# Patient Record
Sex: FEMALE | Race: WHITE | NOT HISPANIC OR LATINO | Employment: PART TIME | URBAN - METROPOLITAN AREA
[De-identification: names, ages, dates, MRNs, and addresses within clinical notes are randomized per-mention and may not be internally consistent; named-entity substitution may affect disease eponyms.]

---

## 2017-02-08 ENCOUNTER — OFFICE VISIT (OUTPATIENT)
Dept: FAMILY MEDICINE | Facility: CLINIC | Age: 57
End: 2017-02-08
Payer: COMMERCIAL

## 2017-02-08 ENCOUNTER — TELEPHONE (OUTPATIENT)
Dept: FAMILY MEDICINE | Facility: CLINIC | Age: 57
End: 2017-02-08

## 2017-02-08 ENCOUNTER — HOSPITAL ENCOUNTER (OUTPATIENT)
Dept: MAMMOGRAPHY | Facility: CLINIC | Age: 57
Discharge: HOME | End: 2017-02-08
Attending: NURSE PRACTITIONER | Admitting: NURSE PRACTITIONER
Payer: COMMERCIAL

## 2017-02-08 VITALS
SYSTOLIC BLOOD PRESSURE: 110 MMHG | BODY MASS INDEX: 28.49 KG/M2 | WEIGHT: 188 LBS | DIASTOLIC BLOOD PRESSURE: 68 MMHG | HEART RATE: 60 BPM | TEMPERATURE: 98.2 F | RESPIRATION RATE: 16 BRPM | OXYGEN SATURATION: 99 % | HEIGHT: 68 IN

## 2017-02-08 DIAGNOSIS — F41.9 ANXIETY: ICD-10-CM

## 2017-02-08 DIAGNOSIS — E78.5 HYPERLIPIDEMIA LDL GOAL <130: ICD-10-CM

## 2017-02-08 DIAGNOSIS — K51.90 ULCERATIVE COLITIS WITHOUT COMPLICATIONS, UNSPECIFIED LOCATION (H): ICD-10-CM

## 2017-02-08 DIAGNOSIS — Z12.31 VISIT FOR SCREENING MAMMOGRAM: ICD-10-CM

## 2017-02-08 DIAGNOSIS — F34.1 DYSTHYMIA: ICD-10-CM

## 2017-02-08 DIAGNOSIS — G43.009 MIGRAINE WITHOUT AURA AND WITHOUT STATUS MIGRAINOSUS, NOT INTRACTABLE: ICD-10-CM

## 2017-02-08 DIAGNOSIS — Z00.00 ENCOUNTER FOR WELL WOMAN EXAM WITHOUT GYNECOLOGICAL EXAM: Primary | ICD-10-CM

## 2017-02-08 DIAGNOSIS — Z90.710 S/P HYSTERECTOMY: ICD-10-CM

## 2017-02-08 LAB
ALBUMIN SERPL-MCNC: 4.4 G/DL (ref 3.4–5)
ALP SERPL-CCNC: 89 U/L (ref 40–150)
ALT SERPL W P-5'-P-CCNC: 29 U/L (ref 0–50)
ANION GAP SERPL CALCULATED.3IONS-SCNC: 10 MMOL/L (ref 3–14)
AST SERPL W P-5'-P-CCNC: 18 U/L (ref 0–45)
BILIRUB SERPL-MCNC: 0.8 MG/DL (ref 0.2–1.3)
BUN SERPL-MCNC: 17 MG/DL (ref 7–30)
CALCIUM SERPL-MCNC: 9.4 MG/DL (ref 8.5–10.1)
CHLORIDE SERPL-SCNC: 104 MMOL/L (ref 94–109)
CHOLEST SERPL-MCNC: 206 MG/DL
CO2 SERPL-SCNC: 27 MMOL/L (ref 20–32)
CREAT SERPL-MCNC: 0.86 MG/DL (ref 0.52–1.04)
GFR SERPL CREATININE-BSD FRML MDRD: 68 ML/MIN/1.7M2
GLUCOSE SERPL-MCNC: 91 MG/DL (ref 70–99)
HDLC SERPL-MCNC: 53 MG/DL
HGB BLD-MCNC: 13.3 G/DL (ref 11.7–15.7)
LDLC SERPL CALC-MCNC: 128 MG/DL
NONHDLC SERPL-MCNC: 153 MG/DL
POTASSIUM SERPL-SCNC: 4.1 MMOL/L (ref 3.4–5.3)
PROT SERPL-MCNC: 7.8 G/DL (ref 6.8–8.8)
SODIUM SERPL-SCNC: 141 MMOL/L (ref 133–144)
TRIGL SERPL-MCNC: 126 MG/DL

## 2017-02-08 PROCEDURE — G0202 SCR MAMMO BI INCL CAD: HCPCS

## 2017-02-08 PROCEDURE — 80061 LIPID PANEL: CPT | Performed by: NURSE PRACTITIONER

## 2017-02-08 PROCEDURE — 99396 PREV VISIT EST AGE 40-64: CPT | Performed by: NURSE PRACTITIONER

## 2017-02-08 PROCEDURE — 80053 COMPREHEN METABOLIC PANEL: CPT | Performed by: NURSE PRACTITIONER

## 2017-02-08 PROCEDURE — 36415 COLL VENOUS BLD VENIPUNCTURE: CPT | Performed by: NURSE PRACTITIONER

## 2017-02-08 PROCEDURE — 85018 HEMOGLOBIN: CPT | Performed by: NURSE PRACTITIONER

## 2017-02-08 RX ORDER — KETOROLAC TROMETHAMINE 10 MG/1
10 TABLET, FILM COATED ORAL EVERY 6 HOURS PRN
Qty: 20 TABLET | Refills: 1 | Status: SHIPPED | OUTPATIENT
Start: 2017-02-08 | End: 2018-03-12

## 2017-02-08 RX ORDER — OXYCODONE HYDROCHLORIDE 5 MG/1
5-10 TABLET ORAL EVERY 6 HOURS PRN
Qty: 30 TABLET | Refills: 0 | Status: SHIPPED | OUTPATIENT
Start: 2017-02-08 | End: 2018-03-12

## 2017-02-08 RX ORDER — ATORVASTATIN CALCIUM 10 MG/1
10 TABLET, FILM COATED ORAL DAILY
Qty: 90 TABLET | Refills: 3 | Status: SHIPPED | OUTPATIENT
Start: 2017-02-08 | End: 2018-02-07

## 2017-02-08 RX ORDER — RIZATRIPTAN BENZOATE 10 MG/1
10 TABLET ORAL
Qty: 18 TABLET | Refills: 9 | Status: SHIPPED | OUTPATIENT
Start: 2017-02-08 | End: 2018-02-18

## 2017-02-08 RX ORDER — CITALOPRAM HYDROBROMIDE 20 MG/1
30 TABLET ORAL DAILY
Qty: 135 TABLET | Refills: 3 | Status: SHIPPED | OUTPATIENT
Start: 2017-02-08 | End: 2018-03-12

## 2017-02-08 RX ORDER — ONDANSETRON 4 MG/1
4-8 TABLET, ORALLY DISINTEGRATING ORAL EVERY 8 HOURS PRN
Qty: 20 TABLET | Refills: 3 | Status: SHIPPED | OUTPATIENT
Start: 2017-02-08 | End: 2018-03-12

## 2017-02-08 NOTE — TELEPHONE ENCOUNTER
Please call Joslyn.  She was seen in the clinic today for a physical.  She is due a colonoscopy.  Last colonoscopy was 2011 and it was recommend that next colonoscopy in 3-5 years.  Thank you.

## 2017-02-08 NOTE — NURSING NOTE
"Chief Complaint   Patient presents with     Physical       Initial /68 mmHg  Pulse 60  Temp(Src) 98.2  F (36.8  C) (Oral)  Resp 16  Ht 5' 8.25\" (1.734 m)  Wt 188 lb (85.276 kg)  BMI 28.36 kg/m2  SpO2 99%  LMP 02/09/2014  Breastfeeding? No Estimated body mass index is 28.36 kg/(m^2) as calculated from the following:    Height as of this encounter: 5' 8.25\" (1.734 m).    Weight as of this encounter: 188 lb (85.276 kg).  Medication Reconciliation: complete.  DALE Gramajo      "

## 2017-02-08 NOTE — Clinical Note
[facility]   [address], [place]  [address]  [phone]      February 9, 2017      Joslyn Sanchez  [address] [address]              Dear Ms. Daniel,    Here are the results of your lab studies. Everything is looking good. Keep taking your cholesterol medication as your cholesterol panel is still a little off.     Take care of yourself and If there is anything else that I can do for you, please do not hesitate to let me know.    Results for orders placed or performed in visit on 02/08/17   Comprehensive metabolic panel   Result Value Ref Range    Sodium 141 133 - 144 mmol/L    Potassium 4.1 3.4 - 5.3 mmol/L    Chloride 104 94 - 109 mmol/L    Carbon Dioxide 27 20 - 32 mmol/L    Anion Gap 10 3 - 14 mmol/L    Glucose 91 70 - 99 mg/dL    Urea Nitrogen 17 7 - 30 mg/dL    Creatinine 0.86 0.52 - 1.04 mg/dL    GFR Estimate 68 >60 mL/min/1.7m2    GFR Estimate If Black 82 >60 mL/min/1.7m2    Calcium 9.4 8.5 - 10.1 mg/dL    Bilirubin Total 0.8 0.2 - 1.3 mg/dL    Albumin 4.4 3.4 - 5.0 g/dL    Protein Total 7.8 6.8 - 8.8 g/dL    Alkaline Phosphatase 89 40 - 150 U/L    ALT 29 0 - 50 U/L    AST 18 0 - 45 U/L   Hemoglobin   Result Value Ref Range    Hemoglobin 13.3 11.7 - 15.7 g/dL   Lipid panel reflex to direct LDL   Result Value Ref Range    Cholesterol 206 (H) <200 mg/dL    Triglycerides 126 <150 mg/dL    HDL Cholesterol 53 >49 mg/dL    LDL Cholesterol Calculated 128 (H) <100 mg/dL    Non HDL Cholesterol 153 (H) <130 mg/dL           Sincerely,    Joslyn Cardoza, CNP/nr

## 2017-02-08 NOTE — MR AVS SNAPSHOT
After Visit Summary   2/8/2017    Joslyn Sanchez    MRN: [id number]           Patient Information     Date Of Birth          1960        Visit Information        Provider Department      2/8/2017 8:20 AM Joslyn Cardoza APRN [facility]        Today's Diagnoses     Encounter for well woman exam without gynecological exam    -  1     Anxiety         Dysthymia         Migraine without aura and without status migrainosus, not intractable         Hyperlipidemia LDL goal <130         S/P hysterectomy           Care Instructions      Preventive Health Recommendations  Female Ages 50 - 64    Yearly exam: See your health care provider every year in order to  o Review health changes.   o Discuss preventive care.    o Review your medicines if your doctor has prescribed any.      Get a Pap test every three years (unless you have an abnormal result and your provider advises testing more often).    If you get Pap tests with HPV test, you only need to test every 5 years, unless you have an abnormal result.     You do not need a Pap test if your uterus was removed (hysterectomy) and you have not had cancer.    You should be tested each year for STDs (sexually transmitted diseases) if you're at risk.     Have a mammogram every 1 to 2 years.    Have a colonoscopy at age 50, or have a yearly FIT test (stool test). These exams screen for colon cancer.      Have a cholesterol test every 5 years, or more often if advised.    Have a diabetes test (fasting glucose) every three years. If you are at risk for diabetes, you should have this test more often.     If you are at risk for osteoporosis (brittle bone disease), think about having a bone density scan (DEXA).    Shots: Get a flu shot each year. Get a tetanus shot every 10 years.    Nutrition:     Eat at least 5 servings of fruits and vegetables each day.    Eat whole-grain bread, whole-wheat pasta and brown rice instead of white  "grains and rice.    Talk to your provider about Calcium and Vitamin D.     Lifestyle    Exercise at least 150 minutes a week (30 minutes a day, 5 days a week). This will help you control your weight and prevent disease.    Limit alcohol to one drink per day.    No smoking.     Wear sunscreen to prevent skin cancer.     See your dentist every six months for an exam and cleaning.    See your eye doctor every 1 to 2 years.            Follow-ups after your visit        Who to contact     If you have questions or need follow up information about today's clinic visit or your schedule please contact [facility] directly at [phone].  Normal or non-critical lab and imaging results will be communicated to you by [email or web address], letter or phone within 4 business days after the clinic has received the results. If you do not hear from us within 7 days, please contact the clinic through [email or web address] or phone. If you have a critical or abnormal lab result, we will notify you by phone as soon as possible.  Submit refill requests through [employer] or call your pharmacy and they will forward the refill request to us. Please allow 3 business days for your refill to be completed.          Additional Information About Your Visit        [email or web address] Information     [employer] lets you send messages to your doctor, view your test results, renew your prescriptions, schedule appointments and more. To sign up, go to [email or web address] . Click on \"Log in\" on the left side of the screen, which will take you to the Welcome page. Then click on \"Sign up Now\" on the right side of the page.     You will be asked to enter the access code listed below, as well as some personal information. Please follow the directions to create your username and password.     Your access code is: [email or web address]  Expires: 2017  9:01 AM     Your access code will  in 90 days. If you need help or a new code, please call your [facility] clinic or " "[phone].        Care EveryWhere ID     This is your Care EveryWhere ID. This could be used by other organizations to access your [facility] medical records  [id number]        Your Vitals Were     Pulse Temperature Respirations    60 98.2  F (36.8  C) (Oral) 16    Height BMI (Body Mass Index) Pulse Oximetry    5' 8.25\" (1.734 m) 28.36 kg/m2 99%    Last Period Breastfeeding?       02/09/2014 No        Blood Pressure from Last 3 Encounters:   02/08/17 110/68   11/25/15 128/80   09/11/14 116/74    Weight from Last 3 Encounters:   02/08/17 188 lb (85.276 kg)   11/25/15 178 lb 12.8 oz (81.103 kg)   06/07/15 170 lb (77.111 kg)              We Performed the Following     Comprehensive metabolic panel     DEPRESSION ACTION PLAN (DAP)     Hemoglobin     Lipid panel reflex to direct LDL          Where to get your medicines      These medications were sent to [id number] - [place] - [address] AT [address]  [address], [place] [address]     Phone:  [phone]    - atorvastatin 10 MG tablet  - citalopram 20 MG tablet  - ketorolac 10 MG tablet  - ondansetron 4 MG ODT tab  - rizatriptan 10 MG tablet      Some of these will need a paper prescription and others can be bought over the counter.  Ask your nurse if you have questions.     Bring a paper prescription for each of these medications    - oxyCODONE 5 MG IR tablet       Primary Care Provider Office Phone # Fax #    [facility] [phone]       [facility] [address] [place] [address]        Thank you!     Thank you for choosing [facility]  for your care. Our goal is always to provide you with excellent care. Hearing back from our patients is one way we can continue to improve our services. Please take a few minutes to complete the written survey that you may receive in the mail after your visit with us. Thank you!             Your " Updated Medication List - Protect others around you: Learn how to safely use, store and throw away your medicines at www.disposemymeds.org.          This list is accurate as of: 2/8/17  9:01 AM.  Always use your most recent med list.                   Brand Name Dispense Instructions for use    atorvastatin 10 MG tablet    LIPITOR    90 tablet    Take 1 tablet (10 mg) by mouth daily       citalopram 20 MG tablet    celeXA    135 tablet    Take 1.5 tablets (30 mg) by mouth daily       DAILY MULTIVITAMIN PO      Take 1 tablet by mouth daily       ketorolac 10 MG tablet    TORADOL    20 tablet    Take 1 tablet (10 mg) by mouth every 6 hours as needed for moderate pain       ondansetron 4 MG ODT tab    ZOFRAN ODT    20 tablet    Take 1-2 tablets (4-8 mg) by mouth every 8 hours as needed for nausea       oxyCODONE 5 MG IR tablet    ROXICODONE    30 tablet    Take 1-2 tablets (5-10 mg) by mouth every 6 hours as needed for pain or other (Moderate to Severe)       rizatriptan 10 MG tablet    MAXALT    18 tablet    Take 1 tablet (10 mg) by mouth at onset of headache for migraine May repeat dose in 2 hours.  Do not exceed 30 mg in 24 hours

## 2017-02-08 NOTE — Clinical Note
My Depression Action Plan  Name: Joslyn Sanchez   Date of Birth 1960  Date: 2/8/2017    My doctor: Joslyn Cardoza   My clinic: [address] [address]  [phone]          GREEN    ZONE   Good Control    What it looks like:     Things are going generally well. You have normal up s and down s. You may even feel depressed from time to time, but bad moods usually last less than a day.   What you need to do:  1. Continue to care for yourself (see self care plan)  2. Check your depression survival kit and update it as needed  3. Follow your physician s recommendations including any medication.  4. Do not stop taking medication unless you consult with your physician first.           YELLOW         ZONE Getting Worse    What it looks like:     Depression is starting to interfere with your life.     It may be hard to get out of bed; you may be starting to isolate yourself from others.    Symptoms of depression are starting to last most all day and this has happened for several days.     You may have suicidal thoughts but they are not constant.   What you need to do:     1. Call your care team, your response to treatment will improve if you keep your care team informed of your progress. Yellow periods are signs an adjustment may need to be made.     2. Continue your self-care, even if you have to fake it!    3. Talk to someone in your support network    4. Open up your depression survival kit           RED    ZONE Medical Alert - Get Help    What it looks like:     Depression is seriously interfering with your life.     You may experience these or other symptoms: You can t get out of bed most days, can t work or engage in other necessary activities, you have trouble taking care of basic hygiene, or basic responsibilities, thoughts of suicide or death that will not go away, self-injurious behavior.     What you need to do:  1. Call your  care team and request a same-day appointment. If they are not available (weekends or after hours) call your local crisis line, emergency room or 911.      Electronically signed by: LASHANDA JURADO, February 8, 2017    Depression Self Care Plan / Survival Kit    Self-Care for Depression  Here s the deal. Your body and mind are really not as separate as most people think.  What you do and think affects how you feel and how you feel influences what you do and think. This means if you do things that people who feel good do, it will help you feel better.  Sometimes this is all it takes.  There is also a place for medication and therapy depending on how severe your depression is, so be sure to consult with your medical provider and/ or Behavioral Health Consultant if your symptoms are worsening or not improving.     In order to better manage my stress, I will:    Exercise  Get some form of exercise, every day. This will help reduce pain and release endorphins, the  feel good  chemicals in your brain. This is almost as good as taking antidepressants!  This is not the same as joining a gym and then never going! (they count on that by the way ) It can be as simple as just going for a walk or doing some gardening, anything that will get you moving.      Hygiene   Maintain good hygiene (Get out of bed in the morning, Make your bed, Brush your teeth, Take a shower, and Get dressed like you were going to work, even if you are unemployed).  If your clothes don't fit try to get ones that do.    Diet  I will strive to eat foods that are good for me, drink plenty of water, and avoid excessive sugar, caffeine, alcohol, and other mood-altering substances.  Some foods that are helpful in depression are: complex carbohydrates, B vitamins, flaxseed, fish or fish oil, fresh fruits and vegetables.    Psychotherapy  I agree to participate in Individual Therapy (if recommended).    Medication  If prescribed medications, I agree to take  them.  Missing doses can result in serious side effects.  I understand that drinking alcohol, or other illicit drug use, may cause potential side effects.  I will not stop my medication abruptly without first discussing it with my provider.    Staying Connected With Others  I will stay in touch with my friends, family members, and my primary care provider/team.    Use your imagination  Be creative.  We all have a creative side; it doesn t matter if it s oil painting, sand castles, or mud pies! This will also kick up the endorphins.    Witness Beauty  (AKA stop and smell the roses) Take a look outside, even in mid-winter. Notice colors, textures. Watch the squirrels and birds.     Service to others  Be of service to others.  There is always someone else in need.  By helping others we can  get out of ourselves  and remember the really important things.  This also provides opportunities for practicing all the other parts of the program.    Humor  Laugh and be silly!  Adjust your TV habits for less news and crime-drama and more comedy.    Control your stress  Try breathing deep, massage therapy, biofeedback, and meditation. Find time to relax each day.     My support system    Clinic Contact:  Phone number:    Contact 1:  Phone number:    Contact 2:  Phone number:    [facility]/:  Phone number:    Therapist:  Phone number:    Local crisis center:    Phone number:    Other community support:  Phone number:

## 2017-02-08 NOTE — PROGRESS NOTES
SUBJECTIVE:     CC: Joslyn Sanchez is an 56 year old woman who presents for preventive health visit.     Physical  Annual:     Getting at least 3 servings of Calcium per day::  Yes    Bi-annual eye exam::  Yes    Dental care twice a year::  Yes    Sleep apnea or symptoms of sleep apnea::  Sleep apnea    Diet::  Low fat/cholesterol    Frequency of exercise::  2-3 days/week    Duration of exercise::  15-30 minutes    Taking medications regularly::  Yes    Medication side effects::  None    Additional concerns today::  No        Concerns: None     Today's PHQ-2 Score:   PHQ-2 ( 1999 Pfizer) 2/8/2017   Q1: Little interest or pleasure in doing things -   Q2: Feeling down, depressed or hopeless -   PHQ-2 Score -   Little interest or pleasure in doing things Not at all   Feeling down, depressed or hopeless Not at all   PHQ-2 Score 0       Abuse: Current or Past(Physical, Sexual or Emotional)- No  Do you feel safe in your environment - Yes    Social History   Substance Use Topics     Smoking status: Former Smoker     Types: Cigarettes     Smokeless tobacco: Never Used      Comment: Quit 28 years ago     Alcohol Use: 0.0 oz/week     0 Standard drinks or equivalent per week      Comment: socially      The patient does not drink >3 drinks per day nor >7 drinks per week.    Recent Labs   Lab Test  11/25/15   0846  08/06/14   1020  10/29/13   1429   CHOL  201*  160  196   HDL  62  66  48*   LDL  113*  76  112   TRIG  128  92  181*   CHOLHDLRATIO   --   2.4  4.1   NHDL  139*   --    --        Reviewed orders with patient.  Reviewed health maintenance and updated orders accordingly - Yes    Mammo Decision Support:  Patient over age 50, mutual decision to screen reflected in health maintenance.    Pertinent mammograms are reviewed under the imaging tab.  History of abnormal Pap smear:complete hysterectomy  She is having some hot flashes.    Migraines.  Still having migraines occasionally.  Maxalt as helpful.  She does  become profoundly nauseated with some headaches and the Zofran is helpful. She does not think she uses the Zofran very often.  She will use Percocet rarely for headaches. 30 tablets will last her one full year.    Cholesterol.  She is on atorvastatin.  Her weight is currently up, and she is working on that.  She tries to eat a lower fat diet.  She has had problems with plantar fasciitis and she has not been able to work out as regularly.  Her feet are feeling better and she is able to work out a little bit more now.  She is planning on going to the [employer] for exercise.    History of UC.  No current symptoms.    All Histories reviewed and updated in Epic.      ROS:  C: NEGATIVE for fever, chills, change in weight  I: NEGATIVE for worrisome rashes, moles or lesions  E: NEGATIVE for vision changes or irritation  ENT: NEGATIVE for ear, mouth and throat problems  R: NEGATIVE for significant cough or SOB  B: NEGATIVE for masses, tenderness or discharge  CV: NEGATIVE for chest pain, palpitations or peripheral edema  GI: NEGATIVE for nausea, abdominal pain, heartburn, or change in bowel habits  : NEGATIVE for unusual urinary or vaginal symptoms. No vaginal bleeding.  M: NEGATIVE for significant arthralgias or myalgia  N: NEGATIVE for weakness, dizziness or paresthesias  E: NEGATIVE for temperature intolerance, skin/hair changes  P: NEGATIVE for changes in mood or affect     Problem list, Medication list, Allergies, and Medical/Social/Surgical histories reviewed in [facility] and updated as appropriate.  Labs reviewed in EPIC  BP Readings from Last 3 Encounters:   02/08/17 110/68   11/25/15 128/80   09/11/14 116/74    Wt Readings from Last 3 Encounters:   02/08/17 188 lb (85.276 kg)   11/25/15 178 lb 12.8 oz (81.103 kg)   06/07/15 170 lb (77.111 kg)               Patient Active Problem List   Diagnosis     Ulcerative colitis (H)     Other health problem within the family     HYPERLIPIDEMIA LDL GOAL <130     Dysthymia     Overweight  (BMI 25.0-29.9)     Anxiety     Advanced directives, counseling/discussion     MIGUEL on CPAP     Perimenopause     S/P hysterectomy     Pain of right heel     Past Surgical History   Procedure Laterality Date     Appendectomy       Laparascopy  x 2 in age 30s     Infertiility     Knee surgery  4/2012     left knee, meniscus     Laparoscopic hysterectomy total, bilateral salpingo-oophorectomy, combined Bilateral 2/18/2014     Procedure: COMBINED LAPAROSCOPIC HYSTERECTOMY TOTAL, SALPINGO-OOPHORECTOMY;  Surgeon: Rama Burton MD;  Location: [facility] OR     Cystoscopy N/A 2/18/2014     Procedure: CYSTOSCOPY;  Surgeon: Rama Burton MD;  Location: [facility] OR       Social History   Substance Use Topics     Smoking status: Former Smoker     Types: Cigarettes     Smokeless tobacco: Never Used      Comment: Quit 28 years ago     Alcohol Use: 0.0 oz/week     0 Standard drinks or equivalent per week      Comment: socially      Family History   Problem Relation Age of Onset     Lipids Mother      DIABETES Maternal Grandmother      DIABETES Paternal Grandmother      Medical History Unknown Father          Current Outpatient Prescriptions   Medication Sig Dispense Refill     citalopram (CELEXA) 20 MG tablet Take 1.5 tablets (30 mg) by mouth daily 135 tablet 3     rizatriptan (MAXALT) 10 MG tablet Take 1 tablet (10 mg) by mouth at onset of headache for migraine May repeat dose in 2 hours.  Do not exceed 30 mg in 24 hours 18 tablet 9     atorvastatin (LIPITOR) 10 MG tablet Take 1 tablet (10 mg) by mouth daily 90 tablet 3     ondansetron (ZOFRAN ODT) 4 MG ODT tab Take 1-2 tablets (4-8 mg) by mouth every 8 hours as needed for nausea 20 tablet 3     ketorolac (TORADOL) 10 MG tablet Take 1 tablet (10 mg) by mouth every 6 hours as needed for moderate pain 20 tablet 1     oxyCODONE (ROXICODONE) 5 MG IR tablet Take 1-2 tablets (5-10 mg) by mouth every 6 hours as needed for pain or other (Moderate to Severe) 30 tablet 0     Multiple  "Vitamin (DAILY MULTIVITAMIN PO) Take 1 tablet by mouth daily       [DISCONTINUED] citalopram (CELEXA) 20 MG tablet Take 1.5 tablets (30 mg) by mouth daily 135 tablet 3     [DISCONTINUED] atorvastatin (LIPITOR) 10 MG tablet Take 1 tablet (10 mg) by mouth daily 90 tablet 3     Allergies   Allergen Reactions     No Known Drug Allergies      Recent Labs   Lab Test  11/25/15   0846  08/06/14   1020  10/29/13   1429  12/03/12   0951  11/21/11   0908   LDL  113*  76  112  116  128   HDL  62  66  48*  50  45*   TRIG  128  92  181*  212*  236*   ALT  27  19  29  27  23   CR  0.90   --   0.91  0.80  0.87   GFRESTIMATED  65   --   64  75  68   GFRESTBLACK  78   --   78  >90  82   POTASSIUM  4.5   --   4.4  5.0  4.6   TSH   --    --    --   1.20  1.10      OBJECTIVE:     /68 mmHg  Pulse 60  Temp(Src) 98.2  F (36.8  C) (Oral)  Resp 16  Ht 5' 8.25\" (1.734 m)  Wt 188 lb (85.276 kg)  BMI 28.36 kg/m2  SpO2 99%  LMP 02/09/2014  Breastfeeding? No  EXAM:  GENERAL: healthy, alert and no distress  EYES: Eyes grossly normal to inspection, PERRL and conjunctivae and sclerae normal  HENT: ear canals and TM's normal, nose and mouth without ulcers or lesions  NECK: no adenopathy, no asymmetry, masses, or scars and thyroid normal to palpation  RESP: lungs clear to auscultation - no rales, rhonchi or wheezes  BREAST: normal without masses, tenderness or nipple discharge and no palpable axillary masses or adenopathy  CV: regular rate and rhythm, normal S1 S2, no S3 or S4, no murmur, click or rub, no peripheral edema and peripheral pulses strong  ABDOMEN: soft, nontender, no hepatosplenomegaly, no masses and bowel sounds normal  MS: no gross musculoskeletal defects noted, no edema  SKIN: no suspicious lesions or rashes  NEURO: Normal strength and tone, mentation intact and speech normal  PSYCH: mentation appears normal, affect normal/bright    ASSESSMENT/PLAN:     (Z00.00) Encounter for well woman exam without gynecological exam  " "(primary encounter diagnosis)  Comment:   Plan: Comprehensive metabolic panel, Hemoglobin,         Lipid panel reflex to direct LDL          Mammogram at her earliest convenience.      (K51.90) Ulcerative colitis without complications, unspecified location (H)  Comment:   Plan:   Due colonoscopy now.      (F41.9) Anxiety  Comment:   Plan: DEPRESSION ACTION PLAN (DAP)            (F34.1) Dysthymia  Comment:   Plan: citalopram (CELEXA) 20 MG tablet        Refills given    (G43.009) Migraine without aura and without status migrainosus, not intractable  Comment:   Plan: rizatriptan (MAXALT) 10 MG tablet, ondansetron         (ZOFRAN ODT) 4 MG ODT tab, ketorolac (TORADOL)         10 MG tablet        Refills given    (E78.5) Hyperlipidemia LDL goal <130  Comment:   Plan: atorvastatin (LIPITOR) 10 MG tablet,         Comprehensive metabolic panel, Lipid panel         reflex to direct LDL        Refills given    (Z90.710) S/P hysterectomy  Comment:   Plan: oxyCODONE (ROXICODONE) 5 MG IR tablet, OB/GYN         REFERRAL        Refills given.  Use sparingly.      COUNSELING:  Reviewed preventive health counseling, as reflected in patient instructions         reports that she has quit smoking. Her smoking use included Cigarettes. She has never used smokeless tobacco.    Estimated body mass index is 28.36 kg/(m^2) as calculated from the following:    Height as of this encounter: 5' 8.25\" (1.734 m).    Weight as of this encounter: 188 lb (85.276 kg).   Weight management plan: Discussed healthy diet and exercise guidelines and patient will follow up in 12 months in clinic to re-evaluate.    Counseling Resources:  ATP IV Guidelines  Pooled Cohorts Equation Calculator  Breast Cancer Risk Calculator  FRAX Risk Assessment  ICSI Preventive Guidelines  Dietary Guidelines for Americans, 2010  USDA's MyPlate  ASA Prophylaxis  Lung CA Screening    Joslyn Cardoza, RENUKA [facility]  Answers for HPI/ROS submitted " by the patient on 2/8/2017   PHQ-2 Depressed: Not at all, Not at all  PHQ-2 Score: 0

## 2017-02-10 NOTE — TELEPHONE ENCOUNTER
Relayed message to pt and she stated she also got a reminder letter in the mail and she will schedule soon.  Mayela DWYER  [place]

## 2017-02-17 ENCOUNTER — TELEPHONE (OUTPATIENT)
Dept: FAMILY MEDICINE | Facility: CLINIC | Age: 57
End: 2017-02-17

## 2017-02-17 NOTE — TELEPHONE ENCOUNTER
Missed questionnaire at recent office visit, called pt. She states that she completed form and placed in mail today.       Jae Barahona MA

## 2017-02-22 ASSESSMENT — PATIENT HEALTH QUESTIONNAIRE - PHQ9: SUM OF ALL RESPONSES TO PHQ QUESTIONS 1-9: 2

## 2017-03-13 ENCOUNTER — TRANSFERRED RECORDS (OUTPATIENT)
Dept: HEALTH INFORMATION MANAGEMENT | Facility: CLINIC | Age: 57
End: 2017-03-13

## 2017-03-31 ENCOUNTER — HEALTH MAINTENANCE LETTER (OUTPATIENT)
Age: 57
End: 2017-03-31

## 2018-03-12 ENCOUNTER — OFFICE VISIT (OUTPATIENT)
Dept: FAMILY MEDICINE | Facility: CLINIC | Age: 58
End: 2018-03-12
Payer: COMMERCIAL

## 2018-03-12 VITALS
DIASTOLIC BLOOD PRESSURE: 81 MMHG | BODY MASS INDEX: 28.34 KG/M2 | TEMPERATURE: 98 F | WEIGHT: 187 LBS | HEIGHT: 68 IN | OXYGEN SATURATION: 99 % | HEART RATE: 52 BPM | RESPIRATION RATE: 18 BRPM | SYSTOLIC BLOOD PRESSURE: 126 MMHG

## 2018-03-12 DIAGNOSIS — G43.009 MIGRAINE WITHOUT AURA AND WITHOUT STATUS MIGRAINOSUS, NOT INTRACTABLE: ICD-10-CM

## 2018-03-12 DIAGNOSIS — E78.5 HYPERLIPIDEMIA LDL GOAL <130: ICD-10-CM

## 2018-03-12 DIAGNOSIS — Z00.00 ROUTINE GENERAL MEDICAL EXAMINATION AT A HEALTH CARE FACILITY: Primary | ICD-10-CM

## 2018-03-12 DIAGNOSIS — F34.1 DYSTHYMIA: ICD-10-CM

## 2018-03-12 DIAGNOSIS — K51.90 ULCERATIVE COLITIS WITHOUT COMPLICATIONS, UNSPECIFIED LOCATION (H): ICD-10-CM

## 2018-03-12 DIAGNOSIS — Z90.710 S/P HYSTERECTOMY: ICD-10-CM

## 2018-03-12 DIAGNOSIS — Z12.31 ENCOUNTER FOR SCREENING MAMMOGRAM FOR BREAST CANCER: ICD-10-CM

## 2018-03-12 LAB
ALBUMIN SERPL-MCNC: 4.4 G/DL (ref 3.4–5)
ALP SERPL-CCNC: 79 U/L (ref 40–150)
ALT SERPL W P-5'-P-CCNC: 20 U/L (ref 0–50)
ANION GAP SERPL CALCULATED.3IONS-SCNC: 5 MMOL/L (ref 3–14)
AST SERPL W P-5'-P-CCNC: 16 U/L (ref 0–45)
BASOPHILS # BLD AUTO: 0 10E9/L (ref 0–0.2)
BASOPHILS NFR BLD AUTO: 0.8 %
BILIRUB SERPL-MCNC: 1.2 MG/DL (ref 0.2–1.3)
BUN SERPL-MCNC: 23 MG/DL (ref 7–30)
CALCIUM SERPL-MCNC: 9.2 MG/DL (ref 8.5–10.1)
CHLORIDE SERPL-SCNC: 102 MMOL/L (ref 94–109)
CHOLEST SERPL-MCNC: 202 MG/DL
CO2 SERPL-SCNC: 29 MMOL/L (ref 20–32)
CREAT SERPL-MCNC: 1.02 MG/DL (ref 0.52–1.04)
DEPRECATED CALCIDIOL+CALCIFEROL SERPL-MC: 22 UG/L (ref 20–75)
DIFFERENTIAL METHOD BLD: NORMAL
EOSINOPHIL # BLD AUTO: 0.1 10E9/L (ref 0–0.7)
EOSINOPHIL NFR BLD AUTO: 1.6 %
ERYTHROCYTE [DISTWIDTH] IN BLOOD BY AUTOMATED COUNT: 12.8 % (ref 10–15)
GFR SERPL CREATININE-BSD FRML MDRD: 55 ML/MIN/1.7M2
GLUCOSE SERPL-MCNC: 86 MG/DL (ref 70–99)
HCT VFR BLD AUTO: 39.5 % (ref 35–47)
HDLC SERPL-MCNC: 56 MG/DL
HGB BLD-MCNC: 13.1 G/DL (ref 11.7–15.7)
LDLC SERPL CALC-MCNC: 127 MG/DL
LYMPHOCYTES # BLD AUTO: 1.8 10E9/L (ref 0.8–5.3)
LYMPHOCYTES NFR BLD AUTO: 35.4 %
MCH RBC QN AUTO: 27.9 PG (ref 26.5–33)
MCHC RBC AUTO-ENTMCNC: 33.2 G/DL (ref 31.5–36.5)
MCV RBC AUTO: 84 FL (ref 78–100)
MONOCYTES # BLD AUTO: 0.5 10E9/L (ref 0–1.3)
MONOCYTES NFR BLD AUTO: 10.7 %
NEUTROPHILS # BLD AUTO: 2.6 10E9/L (ref 1.6–8.3)
NEUTROPHILS NFR BLD AUTO: 51.5 %
NONHDLC SERPL-MCNC: 146 MG/DL
PLATELET # BLD AUTO: 236 10E9/L (ref 150–450)
POTASSIUM SERPL-SCNC: 4.1 MMOL/L (ref 3.4–5.3)
PROT SERPL-MCNC: 7.7 G/DL (ref 6.8–8.8)
RBC # BLD AUTO: 4.69 10E12/L (ref 3.8–5.2)
SODIUM SERPL-SCNC: 136 MMOL/L (ref 133–144)
TRIGL SERPL-MCNC: 93 MG/DL
WBC # BLD AUTO: 5 10E9/L (ref 4–11)

## 2018-03-12 PROCEDURE — 99396 PREV VISIT EST AGE 40-64: CPT | Performed by: FAMILY MEDICINE

## 2018-03-12 PROCEDURE — 85025 COMPLETE CBC W/AUTO DIFF WBC: CPT | Performed by: FAMILY MEDICINE

## 2018-03-12 PROCEDURE — 80061 LIPID PANEL: CPT | Performed by: FAMILY MEDICINE

## 2018-03-12 PROCEDURE — 80053 COMPREHEN METABOLIC PANEL: CPT | Performed by: FAMILY MEDICINE

## 2018-03-12 PROCEDURE — 82306 VITAMIN D 25 HYDROXY: CPT | Performed by: FAMILY MEDICINE

## 2018-03-12 PROCEDURE — 36415 COLL VENOUS BLD VENIPUNCTURE: CPT | Performed by: FAMILY MEDICINE

## 2018-03-12 RX ORDER — ATORVASTATIN CALCIUM 10 MG/1
10 TABLET, FILM COATED ORAL DAILY
Qty: 90 TABLET | Refills: 3 | Status: SHIPPED | OUTPATIENT
Start: 2018-03-12 | End: 2019-04-04

## 2018-03-12 RX ORDER — OXYCODONE HYDROCHLORIDE 5 MG/1
5-10 TABLET ORAL EVERY 6 HOURS PRN
Qty: 45 TABLET | Refills: 0 | Status: SHIPPED | OUTPATIENT
Start: 2018-03-12 | End: 2018-03-12

## 2018-03-12 RX ORDER — RIZATRIPTAN BENZOATE 10 MG/1
TABLET ORAL
Qty: 18 TABLET | Refills: 0 | Status: CANCELLED | OUTPATIENT
Start: 2018-03-12

## 2018-03-12 RX ORDER — CITALOPRAM HYDROBROMIDE 20 MG/1
30 TABLET ORAL DAILY
Qty: 135 TABLET | Refills: 3 | Status: SHIPPED | OUTPATIENT
Start: 2018-03-12 | End: 2019-04-04

## 2018-03-12 RX ORDER — ONDANSETRON 4 MG/1
4-8 TABLET, ORALLY DISINTEGRATING ORAL EVERY 8 HOURS PRN
Qty: 30 TABLET | Refills: 3 | Status: SHIPPED | OUTPATIENT
Start: 2018-03-12 | End: 2019-04-17

## 2018-03-12 RX ORDER — KETOROLAC TROMETHAMINE 10 MG/1
10 TABLET, FILM COATED ORAL EVERY 6 HOURS PRN
Qty: 20 TABLET | Refills: 1 | Status: CANCELLED | OUTPATIENT
Start: 2018-03-12

## 2018-03-12 RX ORDER — RIZATRIPTAN BENZOATE 10 MG/1
TABLET ORAL
Qty: 18 TABLET | Refills: 6 | Status: SHIPPED | OUTPATIENT
Start: 2018-03-12 | End: 2019-04-17

## 2018-03-12 RX ORDER — OXYCODONE HYDROCHLORIDE 5 MG/1
5-10 TABLET ORAL EVERY 6 HOURS PRN
Qty: 45 TABLET | Refills: 0 | Status: SHIPPED | OUTPATIENT
Start: 2018-03-12 | End: 2019-04-17

## 2018-03-12 NOTE — PROGRESS NOTES
SUBJECTIVE:   CC: Joslyn Sanchez is an 57 year old woman who presents for preventive health visit.     Physical   Annual:     Getting at least 3 servings of Calcium per day::  Yes    Bi-annual eye exam::  Yes    Dental care twice a year::  Yes    Sleep apnea or symptoms of sleep apnea::  Sleep apnea    Diet::  Low fat/cholesterol    Frequency of exercise::  4-5 days/week    Duration of exercise::  30-45 minutes    Taking medications regularly::  Yes    Additional concerns today::  No              Social History  .  Son at [employer]-senior baseball and daughter at [employer]-- soccer team.  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        Today's PHQ-2 Score:   PHQ-2 ( 1999 Pfizer) 3/12/2018   Q1: Little interest or pleasure in doing things 0   Q2: Feeling down, depressed or hopeless 0   PHQ-2 Score 0   Q1: Little interest or pleasure in doing things Not at all   Q2: Feeling down, depressed or hopeless Not at all   PHQ-2 Score 0       Abuse: Current or Past(Physical, Sexual or Emotional)- No  Do you feel safe in your environment - Yes    Social History   Substance Use Topics     Smoking status: Former Smoker     Types: Cigarettes     Smokeless tobacco: Never Used      Comment: Quit 28 years ago     Alcohol use 0.0 oz/week     0 Standard drinks or equivalent per week      Comment: socially      No flowsheet data found.No flowsheet data found.    Reviewed orders with patient.  Reviewed health maintenance and updated orders accordingly - Yes  BP Readings from Last 3 Encounters:   03/12/18 126/81   02/08/17 110/68   11/25/15 128/80    Wt Readings from Last 3 Encounters:   03/12/18 187 lb (84.8 kg)   02/08/17 188 lb (85.3 kg)   11/25/15 178 lb 12.8 oz (81.1 kg)                  Patient Active Problem List   Diagnosis     Ulcerative colitis (H)     Other health problem within the family     HYPERLIPIDEMIA LDL GOAL <130     Dysthymia     Overweight (BMI 25.0-29.9)     Anxiety     Advanced directives,  counseling/discussion     MIGUEL on CPAP     Perimenopause     S/P hysterectomy     Pain of right heel     Past Surgical History:   Procedure Laterality Date     APPENDECTOMY       CYSTOSCOPY N/A 2/18/2014    Procedure: CYSTOSCOPY;  Surgeon: Rama Burton MD;  Location: [facility] OR     KNEE SURGERY  4/2012    left knee, meniscus     laparascopy  x 2 in age 30s    Infertiility     LAPAROSCOPIC HYSTERECTOMY TOTAL, BILATERAL SALPINGO-OOPHORECTOMY, COMBINED Bilateral 2/18/2014    Procedure: COMBINED LAPAROSCOPIC HYSTERECTOMY TOTAL, SALPINGO-OOPHORECTOMY;  Surgeon: Rama Burton MD;  Location: [facility] OR       Social History   Substance Use Topics     Smoking status: Former Smoker     Types: Cigarettes     Smokeless tobacco: Never Used      Comment: Quit 28 years ago     Alcohol use 0.0 oz/week     0 Standard drinks or equivalent per week      Comment: socially      Family History   Problem Relation Age of Onset     Lipids Mother      DIABETES Maternal Grandmother      DIABETES Paternal Grandmother      Medical History Unknown Father          Current Outpatient Prescriptions   Medication Sig Dispense Refill     atorvastatin (LIPITOR) 10 MG tablet Take 1 tablet (10 mg) by mouth daily 90 tablet 3     citalopram (CELEXA) 20 MG tablet Take 1.5 tablets (30 mg) by mouth daily 135 tablet 3     ondansetron (ZOFRAN ODT) 4 MG ODT tab Take 1-2 tablets (4-8 mg) by mouth every 8 hours as needed for nausea 30 tablet 3     rizatriptan (MAXALT) 10 MG tablet TAKE ONE TABLET BY MOUTH AT ONSET OF MIGRAINE MAY REPEAT DOSE IN 2 HOURS MAX 30MG IN 24 HOURS 18 tablet 6     oxyCODONE IR (ROXICODONE) 5 MG tablet Take 1-2 tablets (5-10 mg) by mouth every 6 hours as needed for pain or other (Moderate to Severe) 45 tablet 0     Multiple Vitamin (DAILY MULTIVITAMIN PO) Take 1 tablet by mouth daily       [DISCONTINUED] atorvastatin (LIPITOR) 10 MG tablet TAKE ONE TABLET BY MOUTH EVERY DAY 30 tablet 0     [DISCONTINUED] citalopram (CELEXA) 20 MG  "tablet Take 1.5 tablets (30 mg) by mouth daily 135 tablet 3     Allergies   Allergen Reactions     No Known Drug Allergies        Patient over age 50, mutual decision to screen reflected in health maintenance.    Pertinent mammograms are reviewed under the imaging tab.  History of abnormal Pap smear: Status post benign hysterectomy. Health Maintenance and Surgical History updated.    Reviewed and updated as needed this visit by clinical staff  Tobacco  Allergies  Meds  Med Hx  Surg Hx  Fam Hx  Soc Hx        Reviewed and updated as needed this visit by Provider            Review of Systems  C: NEGATIVE for fever, chills, change in weight  I: NEGATIVE for worrisome rashes, moles or lesions  E: NEGATIVE for vision changes or irritation  ENT: NEGATIVE for ear, mouth and throat problems  R: NEGATIVE for significant cough or SOB  B: NEGATIVE for masses, tenderness or discharge  CV: NEGATIVE for chest pain, palpitations or peripheral edema  GI: NEGATIVE for nausea, abdominal pain, heartburn, or change in bowel habits  : NEGATIVE for unusual urinary or vaginal symptoms. No vaginal bleeding.  M: NEGATIVE for significant arthralgias or myalgia  N: NEGATIVE for weakness, dizziness or paresthesias  P: NEGATIVE for changes in mood or affect      OBJECTIVE:   /81 (BP Location: Left arm, Patient Position: Sitting, Cuff Size: Adult Regular)  Pulse 52  Temp 98  F (36.7  C) (Oral)  Resp 18  Ht 5' 8.25\" (1.734 m)  Wt 187 lb (84.8 kg)  LMP 02/09/2014  SpO2 99%  BMI 28.23 kg/m2  Physical Exam  GENERAL: healthy, alert and no distress  EYES: Eyes grossly normal to inspection, PERRL and conjunctivae and sclerae normal  HENT: ear canals and TM's normal, nose and mouth without ulcers or lesions  NECK: no adenopathy, no asymmetry, masses, or scars and thyroid normal to palpation  RESP: lungs clear to auscultation - no rales, rhonchi or wheezes  BREAST: normal without masses, tenderness or nipple discharge and no palpable " axillary masses or adenopathy  CV: regular rate and rhythm, normal S1 S2, no S3 or S4, no murmur, click or rub, no peripheral edema and peripheral pulses strong  ABDOMEN: soft, nontender, no hepatosplenomegaly, no masses and bowel sounds normal   (female): normal female external genitalia, normal urethral meatus, vaginal mucosa pink, moist, well rugated, absent uterus, no adnexal masses palpated  MS: no gross musculoskeletal defects noted, no edema  SKIN: no suspicious lesions or rashes  NEURO: Normal strength and tone, mentation intact and speech normal  PSYCH: mentation appears normal, affect normal/bright    ASSESSMENT/PLAN:   1. Routine general medical examination at a health care facility    - CBC with platelets differential  - Comprehensive metabolic panel  - Lipid panel reflex to direct LDL Fasting  - 25- OH-Vitamin D    Fasting labs today.  Continue good diet and exercise.  No PAP- s/p hysterectomy.    2. Migraine without aura and without status migrainosus, not intractable    - ondansetron (ZOFRAN ODT) 4 MG ODT tab; Take 1-2 tablets (4-8 mg) by mouth every 8 hours as needed for nausea  Dispense: 30 tablet; Refill: 3  - rizatriptan (MAXALT) 10 MG tablet; TAKE ONE TABLET BY MOUTH AT ONSET OF MIGRAINE MAY REPEAT DOSE IN 2 HOURS MAX 30MG IN 24 HOURS  Dispense: 18 tablet; Refill: 6    Refill of Zofran and Maxalt.  Was using Toradol po and oxycodone for rescue.  We decided to discontinue po Toradol with GI bleeding risk with UC.    3. Hyperlipidemia LDL goal <130    - atorvastatin (LIPITOR) 10 MG tablet; Take 1 tablet (10 mg) by mouth daily  Dispense: 90 tablet; Refill: 3    Recheck lipids today.  Statin refilled.    4. Dysthymia    - citalopram (CELEXA) 20 MG tablet; Take 1.5 tablets (30 mg) by mouth daily  Dispense: 135 tablet; Refill: 3    Stable on SSRI.    5. S/P hysterectomy    No PAP.  Pelvic sweep normal today.    6. Ulcerative colitis without complications, unspecified location (H)    - 25- OH-Vitamin  "D    Check Vitamin D with family history of deficiency.    7. Encounter for screening mammogram for breast cancer    - MA Screening Digital Bilateral; Future    To schedule mammogram today.    COUNSELING:  Reviewed preventive health counseling, as reflected in patient instructions       Regular exercise       Healthy diet/nutrition       Colon cancer screening       (Lorelei)menopause management    BP Screening:   Last 3 BP Readings:    BP Readings from Last 3 Encounters:   03/12/18 126/81   02/08/17 110/68   11/25/15 128/80       The following was recommended to the patient:  Re-screen BP within a year and recommended lifestyle modifications     reports that she has quit smoking. Her smoking use included Cigarettes. She has never used smokeless tobacco.    Estimated body mass index is 28.23 kg/(m^2) as calculated from the following:    Height as of this encounter: 5' 8.25\" (1.734 m).    Weight as of this encounter: 187 lb (84.8 kg).   Weight management plan: Discussed healthy diet and exercise guidelines and patient will follow up in 12 months in clinic to re-evaluate.    Counseling Resources:  ATP IV Guidelines  Pooled Cohorts Equation Calculator  Breast Cancer Risk Calculator  FRAX Risk Assessment  ICSI Preventive Guidelines  Dietary Guidelines for Americans, 2010  USDA's MyPlate  ASA Prophylaxis  Lung CA Screening    Cheyenne Barron MD  [facility]  Answers for HPI/ROS submitted by the patient on 3/12/2018   PHQ-2 Score: 0    "

## 2018-03-12 NOTE — MR AVS SNAPSHOT
After Visit Summary   3/12/2018    Joslyn Sanchez    MRN: [id number]           Patient Information     Date Of Birth          1960        Visit Information        Provider Department      3/12/2018 8:00 AM Cheyenne Barron MD [facility]        Today's Diagnoses     Routine general medical examination at a health care facility    -  1    Migraine without aura and without status migrainosus, not intractable        Hyperlipidemia LDL goal <130        Dysthymia        S/P hysterectomy        Ulcerative colitis without complications, unspecified location (H)        Encounter for screening mammogram for breast cancer          Care Instructions      Preventive Health Recommendations  Female Ages 50 - 64    Yearly exam: See your health care provider every year in order to  o Review health changes.   o Discuss preventive care.    o Review your medicines if your doctor has prescribed any.      Get a Pap test every three years (unless you have an abnormal result and your provider advises testing more often).    If you get Pap tests with HPV test, you only need to test every 5 years, unless you have an abnormal result.     You do not need a Pap test if your uterus was removed (hysterectomy) and you have not had cancer.    You should be tested each year for STDs (sexually transmitted diseases) if you're at risk.     Have a mammogram every 1 to 2 years.    Have a colonoscopy at age 50, or have a yearly FIT test (stool test). These exams screen for colon cancer.      Have a cholesterol test every 5 years, or more often if advised.    Have a diabetes test (fasting glucose) every three years. If you are at risk for diabetes, you should have this test more often.     If you are at risk for osteoporosis (brittle bone disease), think about having a bone density scan (DEXA).    Shots: Get a flu shot each year. Get a tetanus shot every 10 years.    Nutrition:     Eat at  "least 5 servings of fruits and vegetables each day.    Eat whole-grain bread, whole-wheat pasta and brown rice instead of white grains and rice.    Talk to your provider about Calcium and Vitamin D.     Lifestyle    Exercise at least 150 minutes a week (30 minutes a day, 5 days a week). This will help you control your weight and prevent disease.    Limit alcohol to one drink per day.    No smoking.     Wear sunscreen to prevent skin cancer.     See your dentist every six months for an exam and cleaning.    See your eye doctor every 1 to 2 years.            Follow-ups after your visit        Future tests that were ordered for you today     Open Future Orders        Priority Expected Expires Ordered    MA Screening Digital Bilateral Routine  3/12/2019 3/12/2018            Who to contact     If you have questions or need follow up information about today's clinic visit or your schedule please contact [facility] directly at [phone].  Normal or non-critical lab and imaging results will be communicated to you by [employer], letter or phone within 4 business days after the clinic has received the results. If you do not hear from us within 7 days, please contact the clinic through [employer] or phone. If you have a critical or abnormal lab result, we will notify you by phone as soon as possible.  Submit refill requests through [employer] (power[employer]) or call your pharmacy and they will forward the refill request to us. Please allow 3 business days for your refill to be completed.          Additional Information About Your Visit        [employer] Information     [employer] ([employer]) lets you send messages to your doctor, view your test results, renew your prescriptions, schedule appointments and more. To sign up, go to [email or web address] . Click on \"Log in\" on the left side of the screen, which will take you to the Welcome page. Then click on \"Sign up Now\" on the right side of the page.     You will be asked to enter the access " "code listed below, as well as some personal information. Please follow the directions to create your username and password.     Your access code is: [email or web address]  Expires: 6/10/2018  8:39 AM     Your access code will  in 90 days. If you need help or a new code, please call your [facility] or [phone].        Care EveryWhere ID     This is your Care EveryWhere ID. This could be used by other organizations to access your [facility] medical records  [id number]        Your Vitals Were     Pulse Temperature Respirations Height Last Period Pulse Oximetry    52 98  F (36.7  C) (Oral) 18 5' 8.25\" (1.734 m) 2014 99%    BMI (Body Mass Index)                   28.23 kg/m2            Blood Pressure from Last 3 Encounters:   18 126/81   17 110/68   11/25/15 128/80    Weight from Last 3 Encounters:   18 187 lb (84.8 kg)   17 188 lb (85.3 kg)   11/25/15 178 lb 12.8 oz (81.1 kg)              We Performed the Following     25- OH-Vitamin D     CBC with platelets differential     Comprehensive metabolic panel     DEPRESSION ACTION PLAN (DAP)     Lipid panel reflex to direct LDL Fasting          Today's Medication Changes          These changes are accurate as of 3/12/18  8:39 AM.  If you have any questions, ask your nurse or doctor.               Start taking these medicines.        Dose/Directions    oxyCODONE IR 5 MG tablet   Commonly known as:  ROXICODONE   Used for:  S/P hysterectomy   Started by:  Cheyenne Barron MD        Dose:  5-10 mg   Take 1-2 tablets (5-10 mg) by mouth every 6 hours as needed for pain or other (Moderate to Severe)   Quantity:  45 tablet   Refills:  0         These medicines have changed or have updated prescriptions.        Dose/Directions    atorvastatin 10 MG tablet   Commonly known as:  LIPITOR   This may have changed:  See the new instructions.   Used for:  Hyperlipidemia LDL goal <130   Changed by:  Cheyenne Barron MD "        Dose:  10 mg   Take 1 tablet (10 mg) by mouth daily   Quantity:  90 tablet   Refills:  3       rizatriptan 10 MG tablet   Commonly known as:  MAXALT   This may have changed:  See the new instructions.   Used for:  Migraine without aura and without status migrainosus, not intractable   Changed by:  Cheyenne Barron MD        TAKE ONE TABLET BY MOUTH AT ONSET OF MIGRAINE MAY REPEAT DOSE IN 2 HOURS MAX 30MG IN 24 HOURS   Quantity:  18 tablet   Refills:  6            Where to get your medicines      These medications were sent to [id number]  [place], [place] - [address] AT [address] & \[facility]\""  [address], [place] [address]     Phone:  [phone]     atorvastatin 10 MG tablet    citalopram 20 MG tablet    ondansetron 4 MG ODT tab    rizatriptan 10 MG tablet         Some of these will need a paper prescription and others can be bought over the counter.  Ask your nurse if you have questions.     Bring a paper prescription for each of these medications     oxyCODONE IR 5 MG tablet                Primary Care Provider Office Phone # Fax #    RENUKA Morton [facility] [phone] [address] [place] [address]        Equal Access to Services     MARIAH STORY AH: Hadii annalise ku hadasho Soomaali, waaxda luqadaha, qaybta kaalmada adeegyada, waxay idiin hayaan boubacar khkarthik morales. So [facility] [phone].    ATENCIÓN: Si habla español, tiene a collins disposición servicios gratuitos de asistencia lingüística. Llame al [phone].    We comply with applicable federal civil rights laws and [place] laws. We do not discriminate on the basis of race, color, national origin, age, disability, sex, sexual orientation, or gender identity.            Thank you!     Thank you for choosing [facility]  for your care. Our goal is always to provide you with excellent care. Hearing back from our patients is one way we can  continue to improve our services. Please take a few minutes to complete the written survey that you may receive in the mail after your visit with us. Thank you!             Your Updated Medication List - Protect others around you: Learn how to safely use, store and throw away your medicines at www.disposemymeds.org.          This list is accurate as of 3/12/18  8:39 AM.  Always use your most recent med list.                   Brand Name Dispense Instructions for use Diagnosis    atorvastatin 10 MG tablet    LIPITOR    90 tablet    Take 1 tablet (10 mg) by mouth daily    Hyperlipidemia LDL goal <130       citalopram 20 MG tablet    celeXA    135 tablet    Take 1.5 tablets (30 mg) by mouth daily    Dysthymia       DAILY MULTIVITAMIN PO      Take 1 tablet by mouth daily        ondansetron 4 MG ODT tab    ZOFRAN ODT    30 tablet    Take 1-2 tablets (4-8 mg) by mouth every 8 hours as needed for nausea    Migraine without aura and without status migrainosus, not intractable       oxyCODONE IR 5 MG tablet    ROXICODONE    45 tablet    Take 1-2 tablets (5-10 mg) by mouth every 6 hours as needed for pain or other (Moderate to Severe)    S/P hysterectomy       rizatriptan 10 MG tablet    MAXALT    18 tablet    TAKE ONE TABLET BY MOUTH AT ONSET OF MIGRAINE MAY REPEAT DOSE IN 2 HOURS MAX 30MG IN 24 HOURS    Migraine without aura and without status migrainosus, not intractable

## 2018-03-12 NOTE — LETTER
My Depression Action Plan  Name: Joslyn Sanchez   Date of Birth 1960  Date: 3/12/2018    My doctor: Joslyn Cardoza   My clinic: [address] [address]  [phone]          GREEN    ZONE   Good Control    What it looks like:     Things are going generally well. You have normal up s and down s. You may even feel depressed from time to time, but bad moods usually last less than a day.   What you need to do:  1. Continue to care for yourself (see self care plan)  2. Check your depression survival kit and update it as needed  3. Follow your physician s recommendations including any medication.  4. Do not stop taking medication unless you consult with your physician first.           YELLOW         ZONE Getting Worse    What it looks like:     Depression is starting to interfere with your life.     It may be hard to get out of bed; you may be starting to isolate yourself from others.    Symptoms of depression are starting to last most all day and this has happened for several days.     You may have suicidal thoughts but they are not constant.   What you need to do:     1. Call your care team, your response to treatment will improve if you keep your care team informed of your progress. Yellow periods are signs an adjustment may need to be made.     2. Continue your self-care, even if you have to fake it!    3. Talk to someone in your support network    4. Open up your depression survival kit           RED    ZONE Medical Alert - Get Help    What it looks like:     Depression is seriously interfering with your life.     You may experience these or other symptoms: You can t get out of bed most days, can t work or engage in other necessary activities, you have trouble taking care of basic hygiene, or basic responsibilities, thoughts of suicide or death that will not go away, self-injurious behavior.     What you need to do:  1. Call your care  team and request a same-day appointment. If they are not available (weekends or after hours) call your local crisis line, emergency room or 911.      Electronically signed by: Flip Dobbins, March 12, 2018    Depression Self Care Plan / Survival Kit    Self-Care for Depression  Here s the deal. Your body and mind are really not as separate as most people think.  What you do and think affects how you feel and how you feel influences what you do and think. This means if you do things that people who feel good do, it will help you feel better.  Sometimes this is all it takes.  There is also a place for medication and therapy depending on how severe your depression is, so be sure to consult with your medical provider and/ or Behavioral Health Consultant if your symptoms are worsening or not improving.     In order to better manage my stress, I will:    Exercise  Get some form of exercise, every day. This will help reduce pain and release endorphins, the  feel good  chemicals in your brain. This is almost as good as taking antidepressants!  This is not the same as joining a gym and then never going! (they count on that by the way ) It can be as simple as just going for a walk or doing some gardening, anything that will get you moving.      Hygiene   Maintain good hygiene (Get out of bed in the morning, Make your bed, Brush your teeth, Take a shower, and Get dressed like you were going to work, even if you are unemployed).  If your clothes don't fit try to get ones that do.    Diet  I will strive to eat foods that are good for me, drink plenty of water, and avoid excessive sugar, caffeine, alcohol, and other mood-altering substances.  Some foods that are helpful in depression are: complex carbohydrates, B vitamins, flaxseed, fish or fish oil, fresh fruits and vegetables.    Psychotherapy  I agree to participate in Individual Therapy (if recommended).    Medication  If prescribed medications, I agree to take them.  Missing  doses can result in serious side effects.  I understand that drinking alcohol, or other illicit drug use, may cause potential side effects.  I will not stop my medication abruptly without first discussing it with my provider.    Staying Connected With Others  I will stay in touch with my friends, family members, and my primary care provider/team.    Use your imagination  Be creative.  We all have a creative side; it doesn t matter if it s oil painting, sand castles, or mud pies! This will also kick up the endorphins.    Witness Beauty  (AKA stop and smell the roses) Take a look outside, even in mid-winter. Notice colors, textures. Watch the squirrels and birds.     Service to others  Be of service to others.  There is always someone else in need.  By helping others we can  get out of ourselves  and remember the really important things.  This also provides opportunities for practicing all the other parts of the program.    Humor  Laugh and be silly!  Adjust your TV habits for less news and crime-drama and more comedy.    Control your stress  Try breathing deep, massage therapy, biofeedback, and meditation. Find time to relax each day.     My support system    Clinic Contact:  Phone number:    Contact 1:  Phone number:    Contact 2:  Phone number:    Taoism/:  Phone number:    Therapist:  Phone number:    Local crisis center:    Phone number:    Other community support:  Phone number:

## 2018-03-13 ASSESSMENT — PATIENT HEALTH QUESTIONNAIRE - PHQ9: SUM OF ALL RESPONSES TO PHQ QUESTIONS 1-9: 1

## 2018-04-18 ENCOUNTER — RADIANT APPOINTMENT (OUTPATIENT)
Dept: MAMMOGRAPHY | Facility: CLINIC | Age: 58
End: 2018-04-18
Attending: FAMILY MEDICINE
Payer: COMMERCIAL

## 2018-04-18 DIAGNOSIS — Z12.31 ENCOUNTER FOR SCREENING MAMMOGRAM FOR BREAST CANCER: ICD-10-CM

## 2018-04-18 DIAGNOSIS — Z12.31 VISIT FOR SCREENING MAMMOGRAM: ICD-10-CM

## 2018-04-18 PROCEDURE — 77063 BREAST TOMOSYNTHESIS BI: CPT | Mod: TC

## 2018-04-18 PROCEDURE — 77067 SCR MAMMO BI INCL CAD: CPT | Mod: TC

## 2018-06-21 DIAGNOSIS — F34.1 DYSTHYMIA: ICD-10-CM

## 2018-06-21 NOTE — TELEPHONE ENCOUNTER
"Requested Prescriptions   Pending Prescriptions Disp Refills     citalopram (CELEXA) 20 MG tablet [Pharmacy Med Name: CITALOPRAM 20MG TABLETS]  Last Written Prescription Date:  3-12-18  Last Fill Quantity: 135 tab,  # refills: 3   Last office visit: 3/12/2018 with prescribing provider:  LISA ROCHA    Future Office Visit:   135 tablet 0     Sig: TAKE ONE AND ONE-HALF TABLETS BY MOUTH DAILY    SSRIs Protocol Passed    6/21/2018  8:03 AM       Passed - PHQ-9 score less than 5 in past 6 months    Please review last PHQ-9 score.   PHQ-9 SCORE 11/25/2015 2/21/2017 3/12/2018   Total Score - - -   Total Score - - -   Total Score 1 2 1     AGUSTIN-7 SCORE 11/21/2011 12/3/2012 12/4/2012   Total Score 6 - 7   Total Score - 7 -          Passed - Patient is age 18 or older       Passed - No active pregnancy on record       Passed - No positive pregnancy test in last 12 months       Passed - Recent (6 mo) or future (30 days) visit within the authorizing provider's specialty    Patient had office visit in the last 6 months or has a visit in the next 30 days with authorizing provider or within the authorizing provider's specialty.  See \"Patient Info\" tab in inbasket, or \"Choose Columns\" in Meds & Orders section of the refill encounter.              "

## 2018-06-25 RX ORDER — CITALOPRAM HYDROBROMIDE 20 MG/1
TABLET ORAL
Qty: 0.1 TABLET | Refills: 0 | OUTPATIENT
Start: 2018-06-25

## 2019-03-01 ENCOUNTER — TRANSFERRED RECORDS (OUTPATIENT)
Dept: HEALTH INFORMATION MANAGEMENT | Facility: CLINIC | Age: 59
End: 2019-03-01

## 2019-04-03 DIAGNOSIS — F34.1 DYSTHYMIA: ICD-10-CM

## 2019-04-03 DIAGNOSIS — E78.5 HYPERLIPIDEMIA LDL GOAL <130: ICD-10-CM

## 2019-04-04 RX ORDER — CITALOPRAM HYDROBROMIDE 20 MG/1
30 TABLET ORAL DAILY
Qty: 135 TABLET | Refills: 0 | Status: SHIPPED | OUTPATIENT
Start: 2019-04-04 | End: 2019-04-17

## 2019-04-04 RX ORDER — ATORVASTATIN CALCIUM 10 MG/1
10 TABLET, FILM COATED ORAL DAILY
Qty: 90 TABLET | Refills: 0 | Status: SHIPPED | OUTPATIENT
Start: 2019-04-04 | End: 2019-04-17

## 2019-04-04 NOTE — TELEPHONE ENCOUNTER
"Requested Prescriptions   Pending Prescriptions Disp Refills     citalopram (CELEXA) 20 MG tablet  Last Written Prescription Date:  03/12/2018  Last Fill Quantity: 135 tablet,  # refills: 3   Last office visit: 3/12/2018 with prescribing provider:  Cheyenne Barron MD    Future Office Visit:   Next 5 appointments (look out 90 days)    Apr 17, 2019  7:20 AM CDT  PHYSICAL with Cheyenne Barron MD  [facility] ([address] [address]  [phone]          135 tablet 3     Sig: Take 1.5 tablets (30 mg) by mouth daily    SSRIs Protocol Failed - 4/3/2019  6:25 PM       Failed - PHQ-9 score less than 5 in past 6 months    Please review last PHQ-9 score.   PHQ-9 SCORE 11/25/2015 2/21/2017 3/12/2018   PHQ-9 Total Score - - -   PHQ-9 Total Score - - -   PHQ-9 Total Score 1 2 1     AGUSTIN-7 SCORE 11/21/2011 12/3/2012 12/4/2012   Total Score 6 - 7   Total Score - 7 -                Passed - Medication is active on med list       Passed - Patient is age 18 or older       Passed - No active pregnancy on record       Passed - No positive pregnancy test in last 12 months       Passed - Recent (6 mo) or future (30 days) visit within the authorizing provider's specialty    Patient had office visit in the last 6 months or has a visit in the next 30 days with authorizing provider or within the authorizing provider's specialty.  See \"Patient Info\" tab in inbasket, or \"Choose Columns\" in Meds & Orders section of the refill encounter.            atorvastatin (LIPITOR) 10 MG tablet  Last Written Prescription Date:  03/12/2018  Last Fill Quantity: 90 tablet,  # refills: 3   Last office visit: 3/12/2018 with prescribing provider:  Cheyenne Barron MD    Future Office Visit:   Next 5 appointments (look out 90 days)    Apr 17, 2019  7:20 AM CDT  PHYSICAL with Cheyenne Barron MD  [facility] " "[facility] [address] [facility] [address]  [phone]          90 tablet 3     Sig: Take 1 tablet (10 mg) by mouth daily    Statins Protocol Failed - 4/3/2019  6:25 PM       Failed - LDL on file in past 12 months    Recent Labs   Lab Test 03/12/18  0842   *            Passed - No abnormal creatine kinase in past 12 months    Recent Labs   Lab Test 11/21/11  0908   CKT 89               Passed - Recent (12 mo) or future (30 days) visit within the authorizing provider's specialty    Patient had office visit in the last 12 months or has a visit in the next 30 days with authorizing provider or within the authorizing provider's specialty.  See \"Patient Info\" tab in inbasket, or \"Choose Columns\" in Meds & Orders section of the refill encounter.             Passed - Medication is active on med list       Passed - Patient is age 18 or older       Passed - No active pregnancy on record       Passed - No positive pregnancy test in past 12 months          "

## 2019-04-04 NOTE — TELEPHONE ENCOUNTER
Prescription approved per [facility] Refill Protocol.  Pt has an upcoming appt on 4/17/19.    Sangita Santiago RN

## 2019-04-17 ENCOUNTER — OFFICE VISIT (OUTPATIENT)
Dept: FAMILY MEDICINE | Facility: CLINIC | Age: 59
End: 2019-04-17
Payer: COMMERCIAL

## 2019-04-17 VITALS
SYSTOLIC BLOOD PRESSURE: 122 MMHG | TEMPERATURE: 97.6 F | RESPIRATION RATE: 16 BRPM | OXYGEN SATURATION: 100 % | HEART RATE: 54 BPM | BODY MASS INDEX: 28.46 KG/M2 | DIASTOLIC BLOOD PRESSURE: 74 MMHG | WEIGHT: 187.8 LBS | HEIGHT: 68 IN

## 2019-04-17 DIAGNOSIS — E78.5 HYPERLIPIDEMIA LDL GOAL <130: ICD-10-CM

## 2019-04-17 DIAGNOSIS — G43.809 OTHER MIGRAINE WITHOUT STATUS MIGRAINOSUS, NOT INTRACTABLE: ICD-10-CM

## 2019-04-17 DIAGNOSIS — F41.9 ANXIETY: ICD-10-CM

## 2019-04-17 DIAGNOSIS — F32.A MILD DEPRESSION: ICD-10-CM

## 2019-04-17 DIAGNOSIS — Z00.00 ROUTINE GENERAL MEDICAL EXAMINATION AT A HEALTH CARE FACILITY: Primary | ICD-10-CM

## 2019-04-17 LAB
ALBUMIN SERPL-MCNC: 4.5 G/DL (ref 3.4–5)
ALP SERPL-CCNC: 76 U/L (ref 40–150)
ALT SERPL W P-5'-P-CCNC: 26 U/L (ref 0–50)
ANION GAP SERPL CALCULATED.3IONS-SCNC: 5 MMOL/L (ref 3–14)
AST SERPL W P-5'-P-CCNC: 16 U/L (ref 0–45)
BASOPHILS # BLD AUTO: 0 10E9/L (ref 0–0.2)
BASOPHILS NFR BLD AUTO: 0.9 %
BILIRUB SERPL-MCNC: 1 MG/DL (ref 0.2–1.3)
BUN SERPL-MCNC: 18 MG/DL (ref 7–30)
CALCIUM SERPL-MCNC: 9.5 MG/DL (ref 8.5–10.1)
CHLORIDE SERPL-SCNC: 106 MMOL/L (ref 94–109)
CHOLEST SERPL-MCNC: 214 MG/DL
CO2 SERPL-SCNC: 27 MMOL/L (ref 20–32)
CREAT SERPL-MCNC: 0.84 MG/DL (ref 0.52–1.04)
DIFFERENTIAL METHOD BLD: NORMAL
EOSINOPHIL # BLD AUTO: 0.1 10E9/L (ref 0–0.7)
EOSINOPHIL NFR BLD AUTO: 1.5 %
ERYTHROCYTE [DISTWIDTH] IN BLOOD BY AUTOMATED COUNT: 12.7 % (ref 10–15)
GFR SERPL CREATININE-BSD FRML MDRD: 76 ML/MIN/{1.73_M2}
GLUCOSE SERPL-MCNC: 95 MG/DL (ref 70–99)
HCT VFR BLD AUTO: 39.7 % (ref 35–47)
HDLC SERPL-MCNC: 58 MG/DL
HGB BLD-MCNC: 13.5 G/DL (ref 11.7–15.7)
LDLC SERPL CALC-MCNC: 132 MG/DL
LYMPHOCYTES # BLD AUTO: 1.5 10E9/L (ref 0.8–5.3)
LYMPHOCYTES NFR BLD AUTO: 32.6 %
MCH RBC QN AUTO: 27.7 PG (ref 26.5–33)
MCHC RBC AUTO-ENTMCNC: 34 G/DL (ref 31.5–36.5)
MCV RBC AUTO: 82 FL (ref 78–100)
MONOCYTES # BLD AUTO: 0.6 10E9/L (ref 0–1.3)
MONOCYTES NFR BLD AUTO: 12.4 %
NEUTROPHILS # BLD AUTO: 2.5 10E9/L (ref 1.6–8.3)
NEUTROPHILS NFR BLD AUTO: 52.6 %
NONHDLC SERPL-MCNC: 156 MG/DL
PLATELET # BLD AUTO: 246 10E9/L (ref 150–450)
POTASSIUM SERPL-SCNC: 4.6 MMOL/L (ref 3.4–5.3)
PROT SERPL-MCNC: 8 G/DL (ref 6.8–8.8)
RBC # BLD AUTO: 4.87 10E12/L (ref 3.8–5.2)
SODIUM SERPL-SCNC: 138 MMOL/L (ref 133–144)
TRIGL SERPL-MCNC: 117 MG/DL
WBC # BLD AUTO: 4.7 10E9/L (ref 4–11)

## 2019-04-17 PROCEDURE — 85025 COMPLETE CBC W/AUTO DIFF WBC: CPT | Performed by: FAMILY MEDICINE

## 2019-04-17 PROCEDURE — 87389 HIV-1 AG W/HIV-1&-2 AB AG IA: CPT | Performed by: FAMILY MEDICINE

## 2019-04-17 PROCEDURE — 99214 OFFICE O/P EST MOD 30 MIN: CPT | Mod: 25 | Performed by: FAMILY MEDICINE

## 2019-04-17 PROCEDURE — 80061 LIPID PANEL: CPT | Performed by: FAMILY MEDICINE

## 2019-04-17 PROCEDURE — 36415 COLL VENOUS BLD VENIPUNCTURE: CPT | Performed by: FAMILY MEDICINE

## 2019-04-17 PROCEDURE — 99396 PREV VISIT EST AGE 40-64: CPT | Performed by: FAMILY MEDICINE

## 2019-04-17 PROCEDURE — 86803 HEPATITIS C AB TEST: CPT | Performed by: FAMILY MEDICINE

## 2019-04-17 PROCEDURE — 80053 COMPREHEN METABOLIC PANEL: CPT | Performed by: FAMILY MEDICINE

## 2019-04-17 RX ORDER — ONDANSETRON 4 MG/1
4-8 TABLET, ORALLY DISINTEGRATING ORAL EVERY 8 HOURS PRN
Qty: 30 TABLET | Refills: 3 | Status: SHIPPED | OUTPATIENT
Start: 2019-04-17 | End: 2020-12-18

## 2019-04-17 RX ORDER — RIZATRIPTAN BENZOATE 10 MG/1
TABLET ORAL
Qty: 18 TABLET | Refills: 6 | Status: SHIPPED | OUTPATIENT
Start: 2019-04-17 | End: 2020-05-04

## 2019-04-17 RX ORDER — OXYCODONE HYDROCHLORIDE 5 MG/1
5-10 TABLET ORAL EVERY 6 HOURS PRN
Qty: 45 TABLET | Refills: 0 | Status: SHIPPED | OUTPATIENT
Start: 2019-04-17 | End: 2020-05-08

## 2019-04-17 RX ORDER — CITALOPRAM HYDROBROMIDE 20 MG/1
30 TABLET ORAL DAILY
Qty: 135 TABLET | Refills: 3 | Status: SHIPPED | OUTPATIENT
Start: 2019-04-17 | End: 2020-05-04

## 2019-04-17 RX ORDER — ATORVASTATIN CALCIUM 10 MG/1
10 TABLET, FILM COATED ORAL DAILY
Qty: 90 TABLET | Refills: 3 | Status: SHIPPED | OUTPATIENT
Start: 2019-04-17 | End: 2020-05-04

## 2019-04-17 ASSESSMENT — ENCOUNTER SYMPTOMS
DIZZINESS: 0
SHORTNESS OF BREATH: 0
FREQUENCY: 0
CHILLS: 0
NAUSEA: 1
HEMATOCHEZIA: 0
SORE THROAT: 0
FEVER: 0
ARTHRALGIAS: 0
PARESTHESIAS: 0
DIARRHEA: 0
COUGH: 0
NERVOUS/ANXIOUS: 0
MYALGIAS: 0
PALPITATIONS: 0
WEAKNESS: 0
JOINT SWELLING: 0
HEADACHES: 1
BREAST MASS: 0
ABDOMINAL PAIN: 0
HEMATURIA: 0
CONSTIPATION: 0
EYE PAIN: 0
HEARTBURN: 0
DYSURIA: 0

## 2019-04-17 ASSESSMENT — PATIENT HEALTH QUESTIONNAIRE - PHQ9
SUM OF ALL RESPONSES TO PHQ QUESTIONS 1-9: 1
SUM OF ALL RESPONSES TO PHQ QUESTIONS 1-9: 1
10. IF YOU CHECKED OFF ANY PROBLEMS, HOW DIFFICULT HAVE THESE PROBLEMS MADE IT FOR YOU TO DO YOUR WORK, TAKE CARE OF THINGS AT HOME, OR GET ALONG WITH OTHER PEOPLE: NOT DIFFICULT AT ALL

## 2019-04-17 ASSESSMENT — MIFFLIN-ST. JEOR: SCORE: 1465.36

## 2019-04-17 NOTE — Clinical Note
Colonoscopy done on this date: 3/2019 (approximately), by this group: [facility], results were normal.

## 2019-04-17 NOTE — PROGRESS NOTES
SUBJECTIVE:   CC: Joslyn Sanchez is an 59 year old woman who presents for preventive health visit.     Healthy Habits:     Getting at least 3 servings of Calcium per day:  Yes    Bi-annual eye exam:  Yes    Dental care twice a year:  Yes    Sleep apnea or symptoms of sleep apnea:  Sleep apnea    Diet:  Low fat/cholesterol    Frequency of exercise:  2-3 days/week    Duration of exercise:  15-30 minutes    Taking medications regularly:  Yes    Medication side effects:  None    PHQ-2 Total Score: 0    Additional concerns today:  Yes     Colonoscopy done on this date: 3/2019 (approximately), by this group: [facility], results were normal.   Next colonoscopy 2 years.    Additional Concerns  Migraines have been well controlled using Maxalt and Zofran prn.  She had #45 oxycodone for breakthrough intractable HAs and this has been beneficial.  She would like to request a new rx for this.  Did not go to ED or UC with HA this past year.  Anxiety/depression remains well-controlled on Celexa 30mg once daily.  She sometimes only takes 20 mg on days she feels well.  She requests refill today.  On a statin for familial hyperlipidemia.  Fasting lipids today.  Requests refill today.  No muscle aches.      Today's PHQ-2 Score:   PHQ-2 ( 1999 [employer]) 4/17/2019   Q1: Little interest or pleasure in doing things 0   Q2: Feeling down, depressed or hopeless 0   PHQ-2 Score 0   Q1: Little interest or pleasure in doing things Not at all   Q2: Feeling down, depressed or hopeless Not at all   PHQ-2 Score 0     Answers for HPI/ROS submitted by the patient on 4/17/2019   Annual Exam:  If you checked off any problems, how difficult have these problems made it for you to do your work, take care of things at home, or get along with other people?: Not difficult at all  PHQ9 TOTAL SCORE: 1    Abuse: Current or Past(Physical, Sexual or Emotional)- No  Do you feel safe in your environment? Yes    Social History     Tobacco Use     Smoking  status: Former Smoker     Types: Cigarettes     Smokeless tobacco: Never Used     Tobacco comment: Quit 28 years ago   Substance Use Topics     Alcohol use: Yes     Alcohol/week: 0.0 oz     Comment: socially      If you drink alcohol do you typically have >3 drinks per day or >7 drinks per week? No    Alcohol Use 4/17/2019   Prescreen: >3 drinks/day or >7 drinks/week? No   Prescreen: >3 drinks/day or >7 drinks/week? -       Reviewed orders with patient.  Reviewed health maintenance and updated orders accordingly - Yes  Labs reviewed in EPIC  BP Readings from Last 3 Encounters:   04/17/19 122/74   03/12/18 126/81   02/08/17 110/68    Wt Readings from Last 3 Encounters:   04/17/19 85.2 kg (187 lb 12.8 oz)   03/12/18 84.8 kg (187 lb)   02/08/17 85.3 kg (188 lb)                  Patient Active Problem List   Diagnosis     Ulcerative colitis (H)     Other health problem within the family     HYPERLIPIDEMIA LDL GOAL <130     Overweight (BMI 25.0-29.9)     Anxiety     Advanced directives, counseling/discussion     MIGUEL on CPAP     Other migraine without status migrainosus, not intractable     Perimenopause     S/P hysterectomy     Mild depression (H)     Past Surgical History:   Procedure Laterality Date     APPENDECTOMY       CYSTOSCOPY N/A 2/18/2014    Procedure: CYSTOSCOPY;  Surgeon: Rama Burton MD;  Location: [facility]     KNEE SURGERY  4/2012    left knee, meniscus     laparascopy  x 2 in age 30s    Infertiility     LAPAROSCOPIC HYSTERECTOMY TOTAL, BILATERAL SALPINGO-OOPHORECTOMY, COMBINED Bilateral 2/18/2014    Procedure: COMBINED LAPAROSCOPIC HYSTERECTOMY TOTAL, SALPINGO-OOPHORECTOMY;  Surgeon: Rama Burton MD;  Location: [facility] OR       Social History     Tobacco Use     Smoking status: Former Smoker     Types: Cigarettes     Smokeless tobacco: Never Used     Tobacco comment: Quit 28 years ago   Substance Use Topics     Alcohol use: Yes     Alcohol/week: 0.0 oz     Comment: socially      Family  History   Problem Relation Age of Onset     Lipids Mother      Diabetes Maternal Grandmother      Diabetes Paternal Grandmother      Medical History Unknown Father          Current Outpatient Medications   Medication Sig Dispense Refill     atorvastatin (LIPITOR) 10 MG tablet Take 1 tablet (10 mg) by mouth daily 90 tablet 3     citalopram (CELEXA) 20 MG tablet Take 1.5 tablets (30 mg) by mouth daily 135 tablet 3     Multiple Vitamin (DAILY MULTIVITAMIN PO) Take 1 tablet by mouth daily       ondansetron (ZOFRAN ODT) 4 MG ODT tab Take 1-2 tablets (4-8 mg) by mouth every 8 hours as needed for nausea 30 tablet 3     oxyCODONE (ROXICODONE) 5 MG tablet Take 1-2 tablets (5-10 mg) by mouth every 6 hours as needed (intractable migraine) 45 tablet 0     rizatriptan (MAXALT) 10 MG tablet TAKE ONE TABLET BY MOUTH AT ONSET OF MIGRAINE MAY REPEAT DOSE IN 2 HOURS MAX 30MG IN 24 HOURS 18 tablet 6     Allergies   Allergen Reactions     No Known Drug Allergies      Recent Labs   Lab Test 03/12/18  0842 02/08/17  0915 11/25/15  0846  12/03/12  0951 11/21/11  0908   * 128* 113*   < > 116 128   HDL 56 53 62   < > 50 45*   TRIG 93 126 128   < > 212* 236*   ALT 20 29 27   < > 27 23   CR 1.02 0.86 0.90   < > 0.80 0.87   GFRESTIMATED 55* 68 65   < > 75 68   GFRESTBLACK 67 82 78   < > >90 82   POTASSIUM 4.1 4.1 4.5   < > 5.0 4.6   TSH  --   --   --   --  1.20 1.10    < > = values in this interval not displayed.        Mammogram Screening: Patient over age 50, mutual decision to screen reflected in health maintenance.    Pertinent mammograms are reviewed under the imaging tab.  History of abnormal Pap smear: Status post benign hysterectomy. Health Maintenance and Surgical History updated.     Reviewed and updated as needed this visit by clinical staff  Tobacco  Allergies  Meds  Med Hx  Surg Hx  Fam Hx  Soc Hx        Reviewed and updated as needed this visit by Provider            Review of Systems   Constitutional: Negative for  "chills and fever.   HENT: Negative for congestion, ear pain, hearing loss and sore throat.    Eyes: Negative for pain and visual disturbance.   Respiratory: Negative for cough and shortness of breath.    Cardiovascular: Negative for chest pain, palpitations and peripheral edema.   Gastrointestinal: Positive for nausea. Negative for abdominal pain, constipation, diarrhea, heartburn and hematochezia.   Breasts:  Negative for tenderness, breast mass and discharge.   Genitourinary: Negative for dysuria, frequency, genital sores, hematuria, pelvic pain, urgency, vaginal bleeding and vaginal discharge.   Musculoskeletal: Negative for arthralgias, joint swelling and myalgias.   Skin: Negative for rash.   Neurological: Positive for headaches. Negative for dizziness, weakness and paresthesias.   Psychiatric/Behavioral: Negative for mood changes. The patient is not nervous/anxious.           OBJECTIVE:   /74   Pulse 54   Temp 97.6  F (36.4  C) (Oral)   Resp 16   Ht 1.727 m (5' 8\")   Wt 85.2 kg (187 lb 12.8 oz)   LMP 02/09/2014   SpO2 100%   Breastfeeding? No   BMI 28.55 kg/m    Physical Exam  GENERAL: healthy, alert and no distress  EYES: Eyes grossly normal to inspection, PERRL and conjunctivae and sclerae normal  HENT: ear canals and TM's normal, nose and mouth without ulcers or lesions  NECK: no adenopathy, no asymmetry, masses, or scars and thyroid normal to palpation  RESP: lungs clear to auscultation - no rales, rhonchi or wheezes  CV: regular rate and rhythm, normal S1 S2, no S3 or S4, no murmur, click or rub, no peripheral edema and peripheral pulses strong  ABDOMEN: soft, nontender, no hepatosplenomegaly, no masses and bowel sounds normal  MS: no gross musculoskeletal defects noted, no edema  SKIN: no suspicious lesions or rashes  NEURO: Normal strength and tone, mentation intact and speech normal  PSYCH: mentation appears normal, affect normal/bright        ASSESSMENT/PLAN:   1. Routine general " "medical examination at a health care facility    - HIV Antigen Antibody Combo  - CBC with platelets differential  - Comprehensive metabolic panel  - **Hepatitis C Screen Reflex to RNA     Fasting labs today.  Annual mammograms discussed.  Colonoscopy UTD.  No PAP- s/p hysterectomy.    2. Other migraine without status migrainosus, not intractable    - oxyCODONE (ROXICODONE) 5 MG tablet; Take 1-2 tablets (5-10 mg) by mouth every 6 hours as needed (intractable migraine)  Dispense: 45 tablet; Refill: 0    Stable on current regimen with #45 oxycodone for intractable HA pain with very infrequent use.  Maxalt and Zofran refilled today.    3. Anxiety  4. Mild depression (H)    Stable on Celexa- refilled today at 30mg daily.  Advised taking regular dosing as prescribed unless desires to taper.  Alternating between 30mg and 20mg prn is not effective as patient is doing.      5. Hyperlipidemia LDL goal <130    - Lipid panel reflex to direct LDL Fasting  - Comprehensive metabolic panel  - atorvastatin (LIPITOR) 10 MG tablet; Take 1 tablet (10 mg) by mouth daily  Dispense: 90 tablet; Refill: 3    Stable on statin.  Refilled today.    COUNSELING:  Reviewed preventive health counseling, as reflected in patient instructions       Regular exercise       Healthy diet/nutrition    BP Readings from Last 1 Encounters:   04/17/19 122/74     Estimated body mass index is 28.55 kg/m  as calculated from the following:    Height as of this encounter: 1.727 m (5' 8\").    Weight as of this encounter: 85.2 kg (187 lb 12.8 oz).    BP Screening:   Last 3 BP Readings:    BP Readings from Last 3 Encounters:   04/17/19 122/74   03/12/18 126/81   02/08/17 110/68       The following was recommended to the patient:  Re-screen BP within a year and recommended lifestyle modifications  Weight management plan: Discussed healthy diet and exercise guidelines     reports that she has quit smoking. Her smoking use included cigarettes. She has never used " smokeless tobacco.      Counseling Resources:  ATP IV Guidelines  Pooled Cohorts Equation Calculator  Breast Cancer Risk Calculator  FRAX Risk Assessment  ICSI Preventive Guidelines  Dietary Guidelines for Americans, 2010  USDA's MyPlate  ASA Prophylaxis  Lung CA Screening    Cheyenne Barron MD  [facility]

## 2019-04-18 LAB
HCV AB SERPL QL IA: NONREACTIVE
HIV 1+2 AB+HIV1 P24 AG SERPL QL IA: NONREACTIVE

## 2019-06-25 ENCOUNTER — HOSPITAL ENCOUNTER (OUTPATIENT)
Dept: MAMMOGRAPHY | Facility: CLINIC | Age: 59
Discharge: HOME | End: 2019-06-25
Attending: FAMILY MEDICINE | Admitting: FAMILY MEDICINE
Payer: COMMERCIAL

## 2019-06-25 DIAGNOSIS — Z12.31 VISIT FOR SCREENING MAMMOGRAM: ICD-10-CM

## 2019-06-25 PROCEDURE — 77067 SCR MAMMO BI INCL CAD: CPT

## 2019-07-30 ENCOUNTER — TRANSFERRED RECORDS (OUTPATIENT)
Dept: HEALTH INFORMATION MANAGEMENT | Facility: CLINIC | Age: 59
End: 2019-07-30

## 2019-08-17 ENCOUNTER — OFFICE VISIT (OUTPATIENT)
Dept: URGENT CARE | Facility: URGENT CARE | Age: 59
End: 2019-08-17
Payer: COMMERCIAL

## 2019-08-17 VITALS
TEMPERATURE: 98 F | RESPIRATION RATE: 18 BRPM | OXYGEN SATURATION: 96 % | WEIGHT: 187 LBS | SYSTOLIC BLOOD PRESSURE: 132 MMHG | BODY MASS INDEX: 28.43 KG/M2 | DIASTOLIC BLOOD PRESSURE: 76 MMHG | HEART RATE: 65 BPM

## 2019-08-17 DIAGNOSIS — G43.011 INTRACTABLE MIGRAINE WITHOUT AURA AND WITH STATUS MIGRAINOSUS: Primary | ICD-10-CM

## 2019-08-17 PROCEDURE — 99214 OFFICE O/P EST MOD 30 MIN: CPT | Mod: 25 | Performed by: PHYSICIAN ASSISTANT

## 2019-08-17 PROCEDURE — 96372 THER/PROPH/DIAG INJ SC/IM: CPT | Performed by: PHYSICIAN ASSISTANT

## 2019-08-17 RX ORDER — KETOROLAC TROMETHAMINE 30 MG/ML
30 INJECTION, SOLUTION INTRAMUSCULAR; INTRAVENOUS ONCE
Status: COMPLETED | OUTPATIENT
Start: 2019-08-17 | End: 2019-08-17

## 2019-08-17 RX ORDER — ONDANSETRON 4 MG/1
8 TABLET, ORALLY DISINTEGRATING ORAL ONCE
Status: COMPLETED | OUTPATIENT
Start: 2019-08-17 | End: 2019-08-17

## 2019-08-17 RX ADMIN — KETOROLAC TROMETHAMINE 30 MG: 30 INJECTION, SOLUTION INTRAMUSCULAR; INTRAVENOUS at 15:21

## 2019-08-17 RX ADMIN — ONDANSETRON 8 MG: 4 TABLET, ORALLY DISINTEGRATING ORAL at 15:20

## 2019-08-17 NOTE — PROGRESS NOTES
SUBJECTIVE:  Joslyn Sanchez is a 59 year old female who comes in for evaluation of headache.  Headache began 2day(s)}ago and is sudden onset and still present  Headache,photophobia,without blurred vision associated with nausea and vomiting. No fever or chills. No neck stiffness. No difficulty with swallowing or speech problems. No numbness of upper or lower extremities. No abdominal pain or urinary symptoms.No arthralgia or myalgia. No rash. Has hx of migraine. No ataxia or lightheadedness.    Past Medical History:   Diagnosis Date     Heavy menses      Migraine, unspecified, without mention of intractable migraine without mention of status migrainosus     intolerant imitrex     Mixed hyperlipidemia      MIGUEL on CPAP      Temporomandibular joint disorders, unspecified      Ulcerative colitis (H)      Current Outpatient Medications   Medication Sig Dispense Refill     atorvastatin (LIPITOR) 10 MG tablet Take 1 tablet (10 mg) by mouth daily 90 tablet 3     citalopram (CELEXA) 20 MG tablet Take 1.5 tablets (30 mg) by mouth daily 135 tablet 3     ondansetron (ZOFRAN ODT) 4 MG ODT tab Take 1-2 tablets (4-8 mg) by mouth every 8 hours as needed for nausea 30 tablet 3     oxyCODONE (ROXICODONE) 5 MG tablet Take 1-2 tablets (5-10 mg) by mouth every 6 hours as needed (intractable migraine) 45 tablet 0     rizatriptan (MAXALT) 10 MG tablet TAKE ONE TABLET BY MOUTH AT ONSET OF MIGRAINE MAY REPEAT DOSE IN 2 HOURS MAX 30MG IN 24 HOURS 18 tablet 6     Multiple Vitamin (DAILY MULTIVITAMIN PO) Take 1 tablet by mouth daily       Social History     Tobacco Use     Smoking status: Former Smoker     Types: Cigarettes     Smokeless tobacco: Never Used     Tobacco comment: Quit 28 years ago   Substance Use Topics     Alcohol use: Yes     Alcohol/week: 0.0 oz     Comment: socially        ROS:  10 point ROS negative except as listed above        OBJECTIVE:  /76   Pulse 65   Temp 98  F (36.7  C)   Resp 18   Wt 84.8 kg (187 lb)    LMP 02/09/2014   SpO2 96%   BMI 28.43 kg/m    GENERAL APPEARANCE: healthy, alert and no distress  EYES: EOMI,  PERRL, conjunctiva clear  HENT: ear canals and TM's normal.  Nose and mouth without ulcers, erythema or lesions  NECK: supple, nontender, no lymphadenopathy  RESP: lungs clear to auscultation - no rales, rhonchi or wheezes  CV: regular rates and rhythm, normal S1 S2, no murmur noted  NEURO: Normal strength and tone, sensory exam grossly normal,  normal speech and mentation, CN 2-12 intact, balance intact, romberg negative  SKIN: no suspicious lesions or rashes    ASSESSMENT:  (G43.011) Intractable migraine without aura and with status migrainosus  (primary encounter diagnosis)  Comment: Symptoms improved with treatment  Plan: INJECTION INTRAMUSCULAR OR SUB-Q, ketorolac         (TORADOL) injection 30 mg, ondansetron         (ZOFRAN-ODT) ODT tab 8 mg    Red flags and emergent follow up discussed, and understood by patient  Follow up with PCP if symptoms worsen or fail to improve

## 2019-09-27 ENCOUNTER — HEALTH MAINTENANCE LETTER (OUTPATIENT)
Age: 59
End: 2019-09-27

## 2020-05-08 ENCOUNTER — VIRTUAL VISIT (OUTPATIENT)
Dept: FAMILY MEDICINE | Facility: CLINIC | Age: 60
End: 2020-05-08
Payer: COMMERCIAL

## 2020-05-08 DIAGNOSIS — F41.9 ANXIETY: ICD-10-CM

## 2020-05-08 DIAGNOSIS — G43.809 OTHER MIGRAINE WITHOUT STATUS MIGRAINOSUS, NOT INTRACTABLE: ICD-10-CM

## 2020-05-08 DIAGNOSIS — E78.5 HYPERLIPIDEMIA LDL GOAL <130: ICD-10-CM

## 2020-05-08 PROCEDURE — 96127 BRIEF EMOTIONAL/BEHAV ASSMT: CPT | Performed by: FAMILY MEDICINE

## 2020-05-08 PROCEDURE — 99214 OFFICE O/P EST MOD 30 MIN: CPT | Mod: 95 | Performed by: FAMILY MEDICINE

## 2020-05-08 RX ORDER — CITALOPRAM HYDROBROMIDE 20 MG/1
30 TABLET ORAL DAILY
Qty: 45 TABLET | Refills: 11 | Status: SHIPPED | OUTPATIENT
Start: 2020-05-08 | End: 2020-12-18

## 2020-05-08 RX ORDER — ATORVASTATIN CALCIUM 10 MG/1
10 TABLET, FILM COATED ORAL DAILY
Qty: 30 TABLET | Refills: 11 | Status: SHIPPED | OUTPATIENT
Start: 2020-05-08 | End: 2020-12-18 | Stop reason: SINTOL

## 2020-05-08 RX ORDER — OXYCODONE HYDROCHLORIDE 5 MG/1
5-10 TABLET ORAL EVERY 6 HOURS PRN
Qty: 45 TABLET | Refills: 0 | Status: SHIPPED | OUTPATIENT
Start: 2020-05-08 | End: 2020-12-18

## 2020-05-08 RX ORDER — RIZATRIPTAN BENZOATE 10 MG/1
TABLET ORAL
Qty: 18 TABLET | Refills: 11 | Status: SHIPPED | OUTPATIENT
Start: 2020-05-08 | End: 2020-12-18

## 2020-05-08 ASSESSMENT — PATIENT HEALTH QUESTIONNAIRE - PHQ9: SUM OF ALL RESPONSES TO PHQ QUESTIONS 1-9: 1

## 2020-05-08 NOTE — PROGRESS NOTES
"Joslyn Sanchez is a 60 year old female who is being evaluated via a billable video visit.      The patient has been notified of following:     \"This video visit will be conducted via a call between you and your physician/provider. We have found that certain health care needs can be provided without the need for an in-person physical exam.  This service lets us provide the care you need with a video conversation.  If a prescription is necessary we can send it directly to your pharmacy.  If lab work is needed we can place an order for that and you can then stop by our lab to have the test done at a later time.    Video visits are billed at different rates depending on your insurance coverage.  Please reach out to your insurance provider with any questions.    If during the course of the call the physician/provider feels a video visit is not appropriate, you will not be charged for this service.\"    Patient has given verbal consent for Video visit? Yes    How would you like to obtain your AVS? Mail a copy    Patient would like the video invitation sent by: Text to cell phone: [phone]    Will anyone else be joining your video visit? No    Subjective     Joslyn Sanchez is a 60 year old female who presents to clinic today for the following health issues:    HPI    Med check and refill today.    Migraines have been well controlled using Maxalt and Zofran prn.  She has #45 oxycodone/year for breakthrough intractable HAs and this has been beneficial.  She would like to request a new rx for this.  Went to  in fall with HA this past year.  No ED visits.    Anxiety/depression remains well-controlled on Celexa 30mg once daily.  She sometimes only takes 20 mg on days she feels well.  She requests refill today.    On a statin for familial hyperlipidemia.  Requests refill today.  No muscle aches.    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    Video Start Time: 1000a        Patient Active " Problem List   Diagnosis     Ulcerative colitis (H)     Other health problem within the family     HYPERLIPIDEMIA LDL GOAL <130     Overweight (BMI 25.0-29.9)     Anxiety     Advanced directives, counseling/discussion     MIGUEL on CPAP     Other migraine without status migrainosus, not intractable     Perimenopause     S/P hysterectomy     Mild depression (H)     Past Surgical History:   Procedure Laterality Date     APPENDECTOMY       CYSTOSCOPY N/A 2/18/2014    Procedure: CYSTOSCOPY;  Surgeon: Rama Burton MD;  Location: [facility]     KNEE SURGERY  4/2012    left knee, meniscus     laparascopy  x 2 in age 30s    Infertiility     LAPAROSCOPIC HYSTERECTOMY TOTAL, BILATERAL SALPINGO-OOPHORECTOMY, COMBINED Bilateral 2/18/2014    Procedure: COMBINED LAPAROSCOPIC HYSTERECTOMY TOTAL, SALPINGO-OOPHORECTOMY;  Surgeon: Rama Burton MD;  Location: [facility] OR       Social History     Tobacco Use     Smoking status: Former Smoker     Types: Cigarettes     Smokeless tobacco: Never Used     Tobacco comment: Quit 28 years ago   Substance Use Topics     Alcohol use: Yes     Alcohol/week: 0.0 standard drinks     Comment: socially      Family History   Problem Relation Age of Onset     Lipids Mother      Diabetes Maternal Grandmother      Diabetes Paternal Grandmother      Medical History Unknown Father          Current Outpatient Medications   Medication Sig Dispense Refill     atorvastatin (LIPITOR) 10 MG tablet Take 1 tablet (10 mg) by mouth daily 30 tablet 11     citalopram (CELEXA) 20 MG tablet Take 1.5 tablets (30 mg) by mouth daily 45 tablet 11     Multiple Vitamin (DAILY MULTIVITAMIN PO) Take 1 tablet by mouth daily       ondansetron (ZOFRAN ODT) 4 MG ODT tab Take 1-2 tablets (4-8 mg) by mouth every 8 hours as needed for nausea 30 tablet 3     oxyCODONE (ROXICODONE) 5 MG tablet Take 1-2 tablets (5-10 mg) by mouth every 6 hours as needed (intractable migraine) 45 tablet 0     rizatriptan (MAXALT) 10 MG tablet TAKE  "ONE TABLET BY MOUTH AT ONSET OF MIGRAINE MAY REPEAT DOSE IN 2 HOURS MAX 30MG IN 24 HOURS 18 tablet 11     Allergies   Allergen Reactions     No Known Drug Allergies      Recent Labs   Lab Test 04/17/19  0806 03/12/18  0842 02/08/17  0915  12/03/12  0951   * 127* 128*   < > 116   HDL 58 56 53   < > 50   TRIG 117 93 126   < > 212*   ALT 26 20 29   < > 27   CR 0.84 1.02 0.86   < > 0.80   GFRESTIMATED 76 55* 68   < > 75   GFRESTBLACK 88 67 82   < > >90   POTASSIUM 4.6 4.1 4.1   < > 5.0   TSH  --   --   --   --  1.20    < > = values in this interval not displayed.      BP Readings from Last 3 Encounters:   08/17/19 132/76   04/17/19 122/74   03/12/18 126/81    Wt Readings from Last 3 Encounters:   08/17/19 84.8 kg (187 lb)   04/17/19 85.2 kg (187 lb 12.8 oz)   03/12/18 84.8 kg (187 lb)                    Reviewed and updated as needed this visit by Provider         Review of Systems   ROS COMP: Constitutional, HEENT, cardiovascular, pulmonary, gi and gu systems are negative, except as otherwise noted.      Objective    LMP 02/09/2014   Estimated body mass index is 28.43 kg/m  as calculated from the following:    Height as of 4/17/19: 1.727 m (5' 8\").    Weight as of 8/17/19: 84.8 kg (187 lb).  Physical Exam     GENERAL: healthy, alert and no distress  EYES: Eyes grossly normal to inspection, conjunctivae and sclerae normal  RESP: no audible wheeze, cough, or visible cyanosis.  No visible retractions or increased work of breathing.  Able to speak fully in complete sentences.  SKIN: no suspicious lesions or rashes  NEURO: Cranial nerves grossly intact, mentation intact and speech normal  PSYCH: mentation appears normal, affect normal/bright, judgement and insight intact, normal speech and appearance well-groomed          Assessment & Plan     1. Hyperlipidemia LDL goal <130    - atorvastatin (LIPITOR) 10 MG tablet; Take 1 tablet (10 mg) by mouth daily  Dispense: 30 tablet; Refill: 11    Will come in this summer for " CPE and labs.    2. Anxiety    - citalopram (CELEXA) 20 MG tablet; Take 1.5 tablets (30 mg) by mouth daily  Dispense: 45 tablet; Refill: 11    Stable on current regimen.    3. Other migraine without status migrainosus, not intractable    - oxyCODONE (ROXICODONE) 5 MG tablet; Take 1-2 tablets (5-10 mg) by mouth every 6 hours as needed (intractable migraine)  Dispense: 45 tablet; Refill: 0  - rizatriptan (MAXALT) 10 MG tablet; TAKE ONE TABLET BY MOUTH AT ONSET OF MIGRAINE MAY REPEAT DOSE IN 2 HOURS MAX 30MG IN 24 HOURS  Dispense: 18 tablet; Refill: 11     Stable on current regimen.    Cheyenne Barron MD  [facility]      Video-Visit Details    Type of service:  Video Visit    Video End Time:1012a    Originating Location (pt. Location): Home    Distant Location (provider location): home  Platform used for Video Visit: [employer] [facility]    No follow-ups on file.       Cheyenne Barron MD

## 2020-07-03 ENCOUNTER — HOSPITAL ENCOUNTER (OUTPATIENT)
Dept: MAMMOGRAPHY | Facility: CLINIC | Age: 60
Discharge: HOME | End: 2020-07-03
Attending: FAMILY MEDICINE | Admitting: FAMILY MEDICINE
Payer: COMMERCIAL

## 2020-07-03 DIAGNOSIS — Z12.31 VISIT FOR SCREENING MAMMOGRAM: ICD-10-CM

## 2020-07-03 PROCEDURE — 77067 SCR MAMMO BI INCL CAD: CPT

## 2020-07-13 ENCOUNTER — OFFICE VISIT (OUTPATIENT)
Dept: URGENT CARE | Facility: URGENT CARE | Age: 60
End: 2020-07-13
Payer: COMMERCIAL

## 2020-07-13 VITALS
OXYGEN SATURATION: 97 % | TEMPERATURE: 98.2 F | SYSTOLIC BLOOD PRESSURE: 140 MMHG | DIASTOLIC BLOOD PRESSURE: 92 MMHG | HEART RATE: 66 BPM | RESPIRATION RATE: 16 BRPM

## 2020-07-13 DIAGNOSIS — G43.919 INTRACTABLE MIGRAINE WITHOUT STATUS MIGRAINOSUS, UNSPECIFIED MIGRAINE TYPE: Primary | ICD-10-CM

## 2020-07-13 PROCEDURE — 99214 OFFICE O/P EST MOD 30 MIN: CPT | Mod: 25 | Performed by: FAMILY MEDICINE

## 2020-07-13 PROCEDURE — 96372 THER/PROPH/DIAG INJ SC/IM: CPT | Performed by: FAMILY MEDICINE

## 2020-07-13 RX ORDER — ONDANSETRON 4 MG/1
4 TABLET, ORALLY DISINTEGRATING ORAL ONCE
Status: COMPLETED | OUTPATIENT
Start: 2020-07-13 | End: 2020-07-13

## 2020-07-13 RX ORDER — KETOROLAC TROMETHAMINE 30 MG/ML
30 INJECTION, SOLUTION INTRAMUSCULAR; INTRAVENOUS ONCE
Status: COMPLETED | OUTPATIENT
Start: 2020-07-13 | End: 2020-07-13

## 2020-07-13 RX ORDER — KETOROLAC TROMETHAMINE 10 MG/1
10 TABLET, FILM COATED ORAL EVERY 6 HOURS PRN
Qty: 15 TABLET | Refills: 0 | Status: SHIPPED | OUTPATIENT
Start: 2020-07-13 | End: 2020-12-18

## 2020-07-13 RX ADMIN — KETOROLAC TROMETHAMINE 30 MG: 30 INJECTION, SOLUTION INTRAMUSCULAR; INTRAVENOUS at 14:10

## 2020-07-13 RX ADMIN — ONDANSETRON 4 MG: 4 TABLET, ORALLY DISINTEGRATING ORAL at 14:09

## 2020-07-13 ASSESSMENT — ENCOUNTER SYMPTOMS: HEADACHES: 1

## 2020-07-13 NOTE — PROGRESS NOTES
SUBJECTIVE:   Joslyn Sanchez is a 60 year old female presenting with a chief complaint of   Chief Complaint   Patient presents with     Urgent Care     migraine: vomiting, throbbing and sensitivity to light and sound x 2 days     She is tried her medications at home including her triptan and not improving still has some nausea unable to keep down her meds.    Used Vicodin at home.  Minimal results    Has had Toradol in the clinic    This headache is unchanged from previous headaches.  Right-sided throbbing.  No visual scotomata.  Lights bother her eyes  She is an established patient of [facility].        Review of Systems   Neurological: Positive for headaches.   All other systems reviewed and are negative.      Past Medical History:   Diagnosis Date     Heavy menses      Migraine, unspecified, without mention of intractable migraine without mention of status migrainosus     intolerant imitrex     Mixed hyperlipidemia      MIGUEL on CPAP      Temporomandibular joint disorders, unspecified      Ulcerative colitis (H)      Family History   Problem Relation Age of Onset     Lipids Mother      Diabetes Maternal Grandmother      Diabetes Paternal Grandmother      Medical History Unknown Father      Current Outpatient Medications   Medication Sig Dispense Refill     ketorolac (TORADOL) 10 MG tablet Take 1 tablet (10 mg) by mouth every 6 hours as needed for moderate pain 15 tablet 0     atorvastatin (LIPITOR) 10 MG tablet Take 1 tablet (10 mg) by mouth daily 30 tablet 11     citalopram (CELEXA) 20 MG tablet Take 1.5 tablets (30 mg) by mouth daily 45 tablet 11     Multiple Vitamin (DAILY MULTIVITAMIN PO) Take 1 tablet by mouth daily       ondansetron (ZOFRAN ODT) 4 MG ODT tab Take 1-2 tablets (4-8 mg) by mouth every 8 hours as needed for nausea 30 tablet 3     oxyCODONE (ROXICODONE) 5 MG tablet Take 1-2 tablets (5-10 mg) by mouth every 6 hours as needed (intractable migraine) 45 tablet 0     rizatriptan (MAXALT) 10 MG  tablet TAKE ONE TABLET BY MOUTH AT ONSET OF MIGRAINE MAY REPEAT DOSE IN 2 HOURS MAX 30MG IN 24 HOURS 18 tablet 11     Social History     Tobacco Use     Smoking status: Former Smoker     Types: Cigarettes     Smokeless tobacco: Never Used     Tobacco comment: Quit 28 years ago   Substance Use Topics     Alcohol use: Yes     Alcohol/week: 0.0 standard drinks     Comment: socially        OBJECTIVE  BP (!) 140/92   Pulse 66   Temp 98.2  F (36.8  C) (Tympanic)   Resp 16   LMP 02/09/2014   SpO2 97%     Physical Exam  Vitals signs and nursing note reviewed.   Constitutional:       Comments: Photophobia   HENT:      Head: Normocephalic.   Eyes:      Extraocular Movements: Extraocular movements intact.      Pupils: Pupils are equal, round, and reactive to light.   Neck:      Musculoskeletal: Normal range of motion.   Cardiovascular:      Rate and Rhythm: Normal rate.      Pulses: Normal pulses.   Abdominal:      Palpations: Abdomen is soft.   Musculoskeletal: Normal range of motion.   Neurological:      General: No focal deficit present.   Psychiatric:         Mood and Affect: Mood normal.         Labs:  No results found for this or any previous visit (from the past 24 hour(s)).    X-Ray was not done.    ASSESSMENT:      ICD-10-CM    1. Intractable migraine without status migrainosus, unspecified migraine type  G43.919 ondansetron (ZOFRAN-ODT) ODT tab 4 mg     ketorolac (TORADOL) injection 30 mg     ketorolac (TORADOL) 10 MG tablet        Medical Decision Making:    Differential Diagnosis:  Migraine headache,    Serious Comorbid Conditions:  Adult:  None    PLAN:    1.  Toradol 30 mg relief of her headache taken the edge off  2.  Oral Toradol    In addition should mention we gave her sublingual Zofran which seemed to help  Followup:    I would like her to see her primary care within the next week to 2 weeks    There are no Patient Instructions on file for this visit.

## 2020-07-13 NOTE — PROGRESS NOTES
Clinic Administered Medication Documentation      Injectable Medication Documentation    Patient was given Ketorolac Tromethamine (Toradol). Prior to medication administration, verified patients identity using patient s name and date of birth. Please see MAR and medication order for additional information. Patient instructed to remain in clinic for 15 minutes and report any adverse reaction to staff immediately .      Was entire vial of medication used? Yes  Vial/Syringe: Single dose vial  Expiration Date:  02/22  Was this medication supplied by the patient? No    Oral Medication Documentation    Patient was given Ondansetron (Zofran) . Prior to medication administration, verified patients identity using patient s name and date of birth. Please see MAR and medication order for additional information.     Was entire amount of medication used? Yes  Expiration Date: Jul 2021

## 2020-12-18 ENCOUNTER — OFFICE VISIT (OUTPATIENT)
Dept: FAMILY MEDICINE | Facility: CLINIC | Age: 60
End: 2020-12-18
Payer: COMMERCIAL

## 2020-12-18 VITALS
DIASTOLIC BLOOD PRESSURE: 78 MMHG | TEMPERATURE: 96.6 F | SYSTOLIC BLOOD PRESSURE: 100 MMHG | WEIGHT: 180 LBS | RESPIRATION RATE: 14 BRPM | BODY MASS INDEX: 27.28 KG/M2 | HEART RATE: 60 BPM | HEIGHT: 68 IN | OXYGEN SATURATION: 97 %

## 2020-12-18 DIAGNOSIS — N95.1 PERIMENOPAUSE: ICD-10-CM

## 2020-12-18 DIAGNOSIS — E78.5 HYPERLIPIDEMIA LDL GOAL <130: ICD-10-CM

## 2020-12-18 DIAGNOSIS — K51.90 ULCERATIVE COLITIS WITHOUT COMPLICATIONS, UNSPECIFIED LOCATION (H): ICD-10-CM

## 2020-12-18 DIAGNOSIS — F32.A MILD DEPRESSION: ICD-10-CM

## 2020-12-18 DIAGNOSIS — Z00.00 ROUTINE GENERAL MEDICAL EXAMINATION AT A HEALTH CARE FACILITY: ICD-10-CM

## 2020-12-18 DIAGNOSIS — F41.9 ANXIETY: ICD-10-CM

## 2020-12-18 DIAGNOSIS — G43.809 OTHER MIGRAINE WITHOUT STATUS MIGRAINOSUS, NOT INTRACTABLE: Primary | ICD-10-CM

## 2020-12-18 LAB
ALBUMIN SERPL-MCNC: 4.4 G/DL (ref 3.4–5)
ALP SERPL-CCNC: 89 U/L (ref 40–150)
ALT SERPL W P-5'-P-CCNC: 40 U/L (ref 0–50)
ANION GAP SERPL CALCULATED.3IONS-SCNC: 6 MMOL/L (ref 3–14)
AST SERPL W P-5'-P-CCNC: 22 U/L (ref 0–45)
BASOPHILS # BLD AUTO: 0 10E9/L (ref 0–0.2)
BASOPHILS NFR BLD AUTO: 0.4 %
BILIRUB SERPL-MCNC: 1.7 MG/DL (ref 0.2–1.3)
BUN SERPL-MCNC: 17 MG/DL (ref 7–30)
CALCIUM SERPL-MCNC: 9.4 MG/DL (ref 8.5–10.1)
CHLORIDE SERPL-SCNC: 105 MMOL/L (ref 94–109)
CHOLEST SERPL-MCNC: 228 MG/DL
CO2 SERPL-SCNC: 26 MMOL/L (ref 20–32)
CREAT SERPL-MCNC: 0.83 MG/DL (ref 0.52–1.04)
DIFFERENTIAL METHOD BLD: NORMAL
EOSINOPHIL # BLD AUTO: 0.1 10E9/L (ref 0–0.7)
EOSINOPHIL NFR BLD AUTO: 1.3 %
ERYTHROCYTE [DISTWIDTH] IN BLOOD BY AUTOMATED COUNT: 13 % (ref 10–15)
GFR SERPL CREATININE-BSD FRML MDRD: 75 ML/MIN/{1.73_M2}
GLUCOSE SERPL-MCNC: 104 MG/DL (ref 70–99)
HCT VFR BLD AUTO: 41.7 % (ref 35–47)
HDLC SERPL-MCNC: 53 MG/DL
HGB BLD-MCNC: 14.3 G/DL (ref 11.7–15.7)
LDLC SERPL CALC-MCNC: 146 MG/DL
LYMPHOCYTES # BLD AUTO: 2.2 10E9/L (ref 0.8–5.3)
LYMPHOCYTES NFR BLD AUTO: 41.1 %
MCH RBC QN AUTO: 27.6 PG (ref 26.5–33)
MCHC RBC AUTO-ENTMCNC: 34.3 G/DL (ref 31.5–36.5)
MCV RBC AUTO: 80 FL (ref 78–100)
MONOCYTES # BLD AUTO: 0.6 10E9/L (ref 0–1.3)
MONOCYTES NFR BLD AUTO: 11.4 %
NEUTROPHILS # BLD AUTO: 2.5 10E9/L (ref 1.6–8.3)
NEUTROPHILS NFR BLD AUTO: 45.8 %
NONHDLC SERPL-MCNC: 175 MG/DL
PLATELET # BLD AUTO: 201 10E9/L (ref 150–450)
POTASSIUM SERPL-SCNC: 4.3 MMOL/L (ref 3.4–5.3)
PROT SERPL-MCNC: 7.7 G/DL (ref 6.8–8.8)
RBC # BLD AUTO: 5.19 10E12/L (ref 3.8–5.2)
SODIUM SERPL-SCNC: 137 MMOL/L (ref 133–144)
TRIGL SERPL-MCNC: 144 MG/DL
WBC # BLD AUTO: 5.4 10E9/L (ref 4–11)

## 2020-12-18 PROCEDURE — 99396 PREV VISIT EST AGE 40-64: CPT | Performed by: FAMILY MEDICINE

## 2020-12-18 PROCEDURE — 99213 OFFICE O/P EST LOW 20 MIN: CPT | Mod: 25 | Performed by: FAMILY MEDICINE

## 2020-12-18 PROCEDURE — 36415 COLL VENOUS BLD VENIPUNCTURE: CPT | Performed by: FAMILY MEDICINE

## 2020-12-18 PROCEDURE — 80061 LIPID PANEL: CPT | Performed by: FAMILY MEDICINE

## 2020-12-18 PROCEDURE — 85025 COMPLETE CBC W/AUTO DIFF WBC: CPT | Performed by: FAMILY MEDICINE

## 2020-12-18 PROCEDURE — 80053 COMPREHEN METABOLIC PANEL: CPT | Performed by: FAMILY MEDICINE

## 2020-12-18 RX ORDER — ONDANSETRON 4 MG/1
4-8 TABLET, ORALLY DISINTEGRATING ORAL EVERY 8 HOURS PRN
Qty: 30 TABLET | Refills: 3 | Status: SHIPPED | OUTPATIENT
Start: 2020-12-18 | End: 2021-12-21

## 2020-12-18 RX ORDER — CITALOPRAM HYDROBROMIDE 20 MG/1
30 TABLET ORAL DAILY
Qty: 45 TABLET | Refills: 11 | Status: SHIPPED | OUTPATIENT
Start: 2020-12-18 | End: 2021-12-21

## 2020-12-18 RX ORDER — ROSUVASTATIN CALCIUM 5 MG/1
5 TABLET, COATED ORAL DAILY
Qty: 90 TABLET | Refills: 3 | Status: SHIPPED | OUTPATIENT
Start: 2020-12-18 | End: 2021-12-13

## 2020-12-18 RX ORDER — ROSUVASTATIN CALCIUM 5 MG/1
5 TABLET, COATED ORAL DAILY
Qty: 90 TABLET | Refills: 3 | Status: SHIPPED | OUTPATIENT
Start: 2020-12-18 | End: 2020-12-18

## 2020-12-18 RX ORDER — RIZATRIPTAN BENZOATE 10 MG/1
TABLET ORAL
Qty: 18 TABLET | Refills: 11 | Status: SHIPPED | OUTPATIENT
Start: 2020-12-18 | End: 2021-12-21

## 2020-12-18 RX ORDER — OXYCODONE HYDROCHLORIDE 5 MG/1
5-10 TABLET ORAL EVERY 6 HOURS PRN
Qty: 45 TABLET | Refills: 0 | Status: SHIPPED | OUTPATIENT
Start: 2020-12-18 | End: 2021-12-21

## 2020-12-18 ASSESSMENT — PATIENT HEALTH QUESTIONNAIRE - PHQ9
SUM OF ALL RESPONSES TO PHQ QUESTIONS 1-9: 3
10. IF YOU CHECKED OFF ANY PROBLEMS, HOW DIFFICULT HAVE THESE PROBLEMS MADE IT FOR YOU TO DO YOUR WORK, TAKE CARE OF THINGS AT HOME, OR GET ALONG WITH OTHER PEOPLE: SOMEWHAT DIFFICULT
SUM OF ALL RESPONSES TO PHQ QUESTIONS 1-9: 3

## 2020-12-18 ASSESSMENT — MIFFLIN-ST. JEOR: SCORE: 1424.18

## 2020-12-18 NOTE — LETTER
December 21, 2020      Joslyn LAM Daniel  [address]  [place] [address]        Dear ,    We are writing to inform you of your test results.    Labs show slight bump in fasting glucose (goal < 100) and your lipids- I attribute this to stress and working on the front-lines in the pandemic as we discussed!   Let's plan a recheck of labs in 6 months to see how the Crestor is working and make sure the glucose level is back to normal to keep you out of the prediabetes range.   Continue with improved diet (after the holidays!) and regular aerobic exercise.   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   Stay safe and well!     Resulted Orders   CBC with platelets differential   Result Value Ref Range    WBC 5.4 4.0 - 11.0 10e9/L    RBC Count 5.19 3.8 - 5.2 10e12/L    Hemoglobin 14.3 11.7 - 15.7 g/dL    Hematocrit 41.7 35.0 - 47.0 %    MCV 80 78 - 100 fl    MCH 27.6 26.5 - 33.0 pg    MCHC 34.3 31.5 - 36.5 g/dL    RDW 13.0 10.0 - 15.0 %    Platelet Count 201 150 - 450 10e9/L    Diff Method Automated Method     % Neutrophils 45.8 %    % Lymphocytes 41.1 %    % Monocytes 11.4 %    % Eosinophils 1.3 %    % Basophils 0.4 %    Absolute Neutrophil 2.5 1.6 - 8.3 10e9/L    Absolute Lymphocytes 2.2 0.8 - 5.3 10e9/L    Absolute Monocytes 0.6 0.0 - 1.3 10e9/L    Absolute Eosinophils 0.1 0.0 - 0.7 10e9/L    Absolute Basophils 0.0 0.0 - 0.2 10e9/L   Comprehensive metabolic panel   Result Value Ref Range    Sodium 137 133 - 144 mmol/L    Potassium 4.3 3.4 - 5.3 mmol/L    Chloride 105 94 - 109 mmol/L    Carbon Dioxide 26 20 - 32 mmol/L    Anion Gap 6 3 - 14 mmol/L    Glucose 104 (H) 70 - 99 mg/dL      Comment:      Fasting specimen    Urea Nitrogen 17 7 - 30 mg/dL    Creatinine 0.83 0.52 - 1.04 mg/dL    GFR Estimate 75 >60 mL/min/[1.73_m2]      Comment:      Non  GFR Calc  Starting 12/18/2018, serum creatinine based estimated GFR  (eGFR) will be   calculated using the Chronic Kidney Disease Epidemiology Collaboration   (CKD-EPI) equation.      GFR Estimate If Black 87 >60 mL/min/[1.73_m2]      Comment:       GFR Calc  Starting 12/18/2018, serum creatinine based estimated GFR (eGFR) will be   calculated using the Chronic Kidney Disease Epidemiology Collaboration   (CKD-EPI) equation.      Calcium 9.4 8.5 - 10.1 mg/dL    Bilirubin Total 1.7 (H) 0.2 - 1.3 mg/dL    Albumin 4.4 3.4 - 5.0 g/dL    Protein Total 7.7 6.8 - 8.8 g/dL    Alkaline Phosphatase 89 40 - 150 U/L    ALT 40 0 - 50 U/L    AST 22 0 - 45 U/L   Lipid panel reflex to direct LDL Fasting   Result Value Ref Range    Cholesterol 228 (H) <200 mg/dL      Comment:      Desirable:       <200 mg/dl    Triglycerides 144 <150 mg/dL      Comment:      Fasting specimen    HDL Cholesterol 53 >49 mg/dL    LDL Cholesterol Calculated 146 (H) <100 mg/dL      Comment:      Above desirable:  100-129 mg/dl  Borderline High:  130-159 mg/dL  High:             160-189 mg/dL  Very high:       >189 mg/dl      Non HDL Cholesterol 175 (H) <130 mg/dL      Comment:      Above Desirable:  130-159 mg/dl  Borderline high:  160-189 mg/dl  High:             190-219 mg/dl  Very high:       >219 mg/dl         If you have any questions or concerns, please call the clinic at the number listed above.       Sincerely,      Cheyenne Barron MD/nr

## 2020-12-18 NOTE — PROGRESS NOTES
SUBJECTIVE:   CC: Joslyn Sanchez is an 60 year old woman who presents for preventive health visit.       Healthy Habits:     Getting at least 3 servings of Calcium per day:  Yes    Bi-annual eye exam:  Yes    Dental care twice a year:  Yes    Sleep apnea or symptoms of sleep apnea:  Sleep apnea    Diet:  Low fat/cholesterol    Frequency of exercise:  2-3 days/week    Duration of exercise:  15-30 minutes    Taking medications regularly:  Yes    Medication side effects:  Muscle aches    PHQ-2 Total Score: 0    Current Chronic Medical Conditions  Migraine HAs  Hyperlipidemia  Depression/AGUSTIN  Ulcerative Colitis- q 2 year colonoscopy- last done 3-2019    Social History  .  Son graduated from [employer]-and daughter senior at [employer]-- soccer team.  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    HCM   Colonoscopy 2019- q 2 years with .  Mammogram 7-2020.  NO PAP- s/p hysterectomy.    Additional Concerns  Needs refill of migraine meds- more HAs during pandemic and working as school RN in person.  Recent UC visit- got Toradol for relief- had run out of oxy rx.  Requests refill today.  Mood stable on citalopram- more frustrated at time with stressors of pandemic.   Recheck of lipids-- has had dome LE aches and pains- would like to try Crestor instead of Lipitor this year.    Today's PHQ-2 Score:   PHQ-2 ( 1999 Pfizer) 12/18/2020   Q1: Little interest or pleasure in doing things 0   Q2: Feeling down, depressed or hopeless 0   PHQ-2 Score 0   Q1: Little interest or pleasure in doing things Not at all   Q2: Feeling down, depressed or hopeless Not at all   PHQ-2 Score 0       PHQ 4/17/2019 5/8/2020 12/18/2020   PHQ-9 Total Score 1 1 3   Q9: Thoughts of better off dead/self-harm past 2 weeks Not at all Not at all Not at all     Abuse: Current or Past (Physical, Sexual or Emotional) - No  Do you feel safe in your environment? Yes        Social History     Tobacco Use     Smoking status: Former  Smoker     Types: Cigarettes     Smokeless tobacco: Never Used     Tobacco comment: Quit 28 years ago   Substance Use Topics     Alcohol use: Yes     Alcohol/week: 0.0 standard drinks     Comment: socially          Alcohol Use 12/18/2020   Prescreen: >3 drinks/day or >7 drinks/week? No   Prescreen: >3 drinks/day or >7 drinks/week? -       Reviewed orders with patient.  Reviewed health maintenance and updated orders accordingly - Yes  BP Readings from Last 3 Encounters:   12/18/20 100/78   07/13/20 (!) 140/92   08/17/19 132/76    Wt Readings from Last 3 Encounters:   12/18/20 81.6 kg (180 lb)   08/17/19 84.8 kg (187 lb)   04/17/19 85.2 kg (187 lb 12.8 oz)                  Patient Active Problem List   Diagnosis     Ulcerative colitis (H)     Other health problem within the family     HYPERLIPIDEMIA LDL GOAL <130     Overweight (BMI 25.0-29.9)     Anxiety     Advanced directives, counseling/discussion     MIGUEL on CPAP     Other migraine without status migrainosus, not intractable     Perimenopause     S/P hysterectomy     Mild depression (H)     Past Surgical History:   Procedure Laterality Date     APPENDECTOMY       CYSTOSCOPY N/A 2/18/2014    Procedure: CYSTOSCOPY;  Surgeon: Rama Burton MD;  Location: [facility] OR     KNEE SURGERY  4/2012    left knee, meniscus     laparascopy  x 2 in age 30s    Infertiility     LAPAROSCOPIC HYSTERECTOMY TOTAL, BILATERAL SALPINGO-OOPHORECTOMY, COMBINED Bilateral 2/18/2014    Procedure: COMBINED LAPAROSCOPIC HYSTERECTOMY TOTAL, SALPINGO-OOPHORECTOMY;  Surgeon: Rama Burton MD;  Location: [facility] OR       Social History     Tobacco Use     Smoking status: Former Smoker     Types: Cigarettes     Smokeless tobacco: Never Used     Tobacco comment: Quit 28 years ago   Substance Use Topics     Alcohol use: Yes     Alcohol/week: 0.0 standard drinks     Comment: socially      Family History   Problem Relation Age of Onset     Lipids Mother      Diabetes Maternal Grandmother       Diabetes Paternal Grandmother      Medical History Unknown Father          Current Outpatient Medications   Medication Sig Dispense Refill     citalopram (CELEXA) 20 MG tablet Take 1.5 tablets (30 mg) by mouth daily 45 tablet 11     Multiple Vitamin (DAILY MULTIVITAMIN PO) Take 1 tablet by mouth daily       ondansetron (ZOFRAN ODT) 4 MG ODT tab Take 1-2 tablets (4-8 mg) by mouth every 8 hours as needed for nausea 30 tablet 3     oxyCODONE (ROXICODONE) 5 MG tablet Take 1-2 tablets (5-10 mg) by mouth every 6 hours as needed (intractable migraine) 45 tablet 0     rizatriptan (MAXALT) 10 MG tablet TAKE ONE TABLET BY MOUTH AT ONSET OF MIGRAINE MAY REPEAT DOSE IN 2 HOURS MAX 30MG IN 24 HOURS 18 tablet 11     rosuvastatin (CRESTOR) 5 MG tablet Take 1 tablet (5 mg) by mouth daily 90 tablet 3     Allergies   Allergen Reactions     No Known Drug Allergies      Recent Labs   Lab Test 12/18/20  1100 04/17/19  0806 03/12/18  0842 12/03/12  0951 12/03/12  0951   * 132* 127*   < > 116   HDL 53 58 56   < > 50   TRIG 144 117 93   < > 212*   ALT 40 26 20   < > 27   CR 0.83 0.84 1.02   < > 0.80   GFRESTIMATED 75 76 55*   < > 75   GFRESTBLACK 87 88 67   < > >90   POTASSIUM 4.3 4.6 4.1   < > 5.0   TSH  --   --   --   --  1.20    < > = values in this interval not displayed.        Mammogram Screening: Patient over age 50, mutual decision to screen reflected in health maintenance.    Pertinent mammograms are reviewed under the imaging tab.  History of abnormal Pap smear: YES - updated in Problem List and Health Maintenance accordingly     Reviewed and updated as needed this visit by clinical staff  Tobacco  Allergies  Meds   Med Hx  Surg Hx  Fam Hx  Soc Hx        Reviewed and updated as needed this visit by Provider                    Review of Systems  CONSTITUTIONAL: NEGATIVE for fever, chills, change in weight  INTEGUMENTARY/SKIN: NEGATIVE for worrisome rashes, moles or lesions  EYES: NEGATIVE for vision changes or  irritation  ENT: NEGATIVE for ear, mouth and throat problems  RESP: NEGATIVE for significant cough or SOB  BREAST: NEGATIVE for masses, tenderness or discharge  CV: NEGATIVE for chest pain, palpitations or peripheral edema  GI: NEGATIVE for nausea, abdominal pain, heartburn, or change in bowel habits  : NEGATIVE for unusual urinary or vaginal symptoms. No vaginal bleeding.  MUSCULOSKELETAL: NEGATIVE for significant arthralgias or myalgia  NEURO: NEGATIVE for weakness, dizziness or paresthesias  PSYCHIATRIC: NEGATIVE for changes in mood or affect      OBJECTIVE:   LMP 02/09/2014   Physical Exam  GENERAL: healthy, alert and no distress  EYES: Eyes grossly normal to inspection, PERRL and conjunctivae and sclerae normal  HENT: ear canals and TM's normal, nose and mouth without ulcers or lesions  NECK: no adenopathy, no asymmetry, masses, or scars and thyroid normal to palpation  RESP: lungs clear to auscultation - no rales, rhonchi or wheezes  CV: regular rate and rhythm, normal S1 S2, no S3 or S4, no murmur, click or rub, no peripheral edema and peripheral pulses strong  ABDOMEN: soft, nontender, no hepatosplenomegaly, no masses and bowel sounds normal  MS: no gross musculoskeletal defects noted, no edema  SKIN: no suspicious lesions or rashes  NEURO: Normal strength and tone, mentation intact and speech normal  PSYCH: mentation appears normal, affect normal/bright    ASSESSMENT/PLAN:   1. Routine general medical examination at a health care facility    - CBC with platelets differential  - Comprehensive metabolic panel  - Lipid panel reflex to direct LDL Fasting    Fasting labs today.  Continue good diet and exercise.    2. Other migraine without status migrainosus, not intractable    - oxyCODONE (ROXICODONE) 5 MG tablet; Take 1-2 tablets (5-10 mg) by mouth every 6 hours as needed (intractable migraine)  Dispense: 45 tablet; Refill: 0  - rizatriptan (MAXALT) 10 MG tablet; TAKE ONE TABLET BY MOUTH AT ONSET OF MIGRAINE  "MAY REPEAT DOSE IN 2 HOURS MAX 30MG IN 24 HOURS  Dispense: 18 tablet; Refill: 11  - ondansetron (ZOFRAN ODT) 4 MG ODT tab; Take 1-2 tablets (4-8 mg) by mouth every 8 hours as needed for nausea  Dispense: 30 tablet; Refill: 3    Stable on regimen.  Uses oxycodone very sparingly to keep out of UC.    3. Mild depression (H)  4. Anxiety    - citalopram (CELEXA) 20 MG tablet; Take 1.5 tablets (30 mg) by mouth daily  Dispense: 45 tablet; Refill: 11    Stable on current regimen- refilled today.    5. Ulcerative colitis without complications, unspecified location (H)    Followed by GI- next colonoscopy due 2021.    6. Perimenopause      7. Hyperlipidemia LDL goal <130    - rosuvastatin (CRESTOR) 5 MG tablet; Take 1 tablet (5 mg) by mouth daily  Dispense: 90 tablet; Refill: 3    Change off Lipitor to Crestor with leg cramps to see if any change.  Lipids today.    Patient has been advised of split billing requirements and indicates understanding: Yes  COUNSELING:  Reviewed preventive health counseling, as reflected in patient instructions       Regular exercise       Healthy diet/nutrition    Estimated body mass index is 28.43 kg/m  as calculated from the following:    Height as of 4/17/19: 1.727 m (5' 8\").    Weight as of 8/17/19: 84.8 kg (187 lb).    Weight management plan: Discussed healthy diet and exercise guidelines    She reports that she has quit smoking. Her smoking use included cigarettes. She has never used smokeless tobacco.      Counseling Resources:  ATP IV Guidelines  Pooled Cohorts Equation Calculator  Breast Cancer Risk Calculator  BRCA-Related Cancer Risk Assessment: FHS-7 Tool  FRAX Risk Assessment  ICSI Preventive Guidelines  Dietary Guidelines for Americans, 2010  USDA's MyPlate  ASA Prophylaxis  Lung CA Screening    Cheyenne Barron MD  [facility]  "

## 2020-12-19 DIAGNOSIS — E78.5 HYPERLIPIDEMIA LDL GOAL <130: Primary | ICD-10-CM

## 2020-12-19 DIAGNOSIS — R73.01 IMPAIRED FASTING GLUCOSE: ICD-10-CM

## 2020-12-19 ASSESSMENT — PATIENT HEALTH QUESTIONNAIRE - PHQ9: SUM OF ALL RESPONSES TO PHQ QUESTIONS 1-9: 3

## 2021-05-30 DIAGNOSIS — E78.5 HYPERLIPIDEMIA LDL GOAL <130: ICD-10-CM

## 2021-06-01 RX ORDER — ATORVASTATIN CALCIUM 10 MG/1
TABLET, FILM COATED ORAL
Qty: 0.1 TABLET | Refills: 0 | OUTPATIENT
Start: 2021-06-01

## 2021-07-01 DIAGNOSIS — R73.01 IMPAIRED FASTING GLUCOSE: ICD-10-CM

## 2021-07-01 DIAGNOSIS — E78.5 HYPERLIPIDEMIA LDL GOAL <130: ICD-10-CM

## 2021-07-01 LAB — HBA1C MFR BLD: 5.3 % (ref 0–5.6)

## 2021-07-01 PROCEDURE — 36415 COLL VENOUS BLD VENIPUNCTURE: CPT | Performed by: FAMILY MEDICINE

## 2021-07-01 PROCEDURE — 80061 LIPID PANEL: CPT | Performed by: FAMILY MEDICINE

## 2021-07-01 PROCEDURE — 83036 HEMOGLOBIN GLYCOSYLATED A1C: CPT | Performed by: FAMILY MEDICINE

## 2021-07-01 PROCEDURE — 80053 COMPREHEN METABOLIC PANEL: CPT | Performed by: FAMILY MEDICINE

## 2021-07-02 LAB
ALBUMIN SERPL-MCNC: 4.4 G/DL (ref 3.4–5)
ALP SERPL-CCNC: 84 U/L (ref 40–150)
ALT SERPL W P-5'-P-CCNC: 25 U/L (ref 0–50)
ANION GAP SERPL CALCULATED.3IONS-SCNC: 1 MMOL/L (ref 3–14)
AST SERPL W P-5'-P-CCNC: 18 U/L (ref 0–45)
BILIRUB SERPL-MCNC: 1.3 MG/DL (ref 0.2–1.3)
BUN SERPL-MCNC: 24 MG/DL (ref 7–30)
CALCIUM SERPL-MCNC: 9.4 MG/DL (ref 8.5–10.1)
CHLORIDE SERPL-SCNC: 109 MMOL/L (ref 94–109)
CHOLEST SERPL-MCNC: 185 MG/DL
CO2 SERPL-SCNC: 31 MMOL/L (ref 20–32)
CREAT SERPL-MCNC: 0.92 MG/DL (ref 0.52–1.04)
GFR SERPL CREATININE-BSD FRML MDRD: 66 ML/MIN/{1.73_M2}
GLUCOSE SERPL-MCNC: 93 MG/DL (ref 70–99)
HDLC SERPL-MCNC: 64 MG/DL
LDLC SERPL CALC-MCNC: 108 MG/DL
NONHDLC SERPL-MCNC: 121 MG/DL
POTASSIUM SERPL-SCNC: 4.2 MMOL/L (ref 3.4–5.3)
PROT SERPL-MCNC: 7.7 G/DL (ref 6.8–8.8)
SODIUM SERPL-SCNC: 141 MMOL/L (ref 133–144)
TRIGL SERPL-MCNC: 63 MG/DL

## 2021-08-04 ENCOUNTER — HOSPITAL ENCOUNTER (OUTPATIENT)
Dept: MAMMOGRAPHY | Facility: CLINIC | Age: 61
Discharge: HOME | End: 2021-08-04
Attending: FAMILY MEDICINE | Admitting: FAMILY MEDICINE
Payer: COMMERCIAL

## 2021-08-04 DIAGNOSIS — Z12.31 VISIT FOR SCREENING MAMMOGRAM: ICD-10-CM

## 2021-08-04 PROCEDURE — 77067 SCR MAMMO BI INCL CAD: CPT

## 2021-11-29 ENCOUNTER — NURSE TRIAGE (OUTPATIENT)
Dept: PEDIATRICS | Facility: CLINIC | Age: 61
End: 2021-11-29
Payer: COMMERCIAL

## 2021-11-29 NOTE — TELEPHONE ENCOUNTER
"Received call from pt with complaints of intermittent chest pain since last week  The pain in a 1/10 in her left chest millie when she takes a deep breath.  Did radiate once into her left side of her neck    Advised that pt be seen today in the [facility] or Emergency room  Pt verbalized understanding and agrees to the plan    Thank you  Too Nichols RN on 11/29/2021 at 10:43 AM    Reason for Disposition    Chest pain lasting longer than 5 minutes and occurred in last 3 days (72 hours) (Exception: feels exactly the same as previously diagnosed heartburn and has accompanying sour taste in mouth)    Answer Assessment - Initial Assessment Questions  1. LOCATION: \"Where does it hurt?\"        Left side of chest right above her heart. Last week was worse  2. RADIATION: \"Does the pain go anywhere else?\" (e.g., into neck, jaw, arms, back)      A little bit into her neck last week  3. ONSET: \"When did the chest pain begin?\" (Minutes, hours or days)        Off and on since last week.  4. PATTERN \"Does the pain come and go, or has it been constant since it started?\"  \"Does it get worse with exertion?\"       Comes and goes. Sore when she takes a deep breath.   5. DURATION: \"How long does it last\" (e.g., seconds, minutes, hours)      Getting better. Worse in the morning  6. SEVERITY: \"How bad is the pain?\"  (e.g., Scale 1-10; mild, moderate, or severe)     - MILD (1-3): doesn't interfere with normal activities      - MODERATE (4-7): interferes with normal activities or awakens from sleep     - SEVERE (8-10): excruciating pain, unable to do any normal activities        6 at it's worse  7. CARDIAC RISK FACTORS: \"Do you have any history of heart problems or risk factors for heart disease?\" (e.g., angina, prior heart attack; diabetes, high blood pressure, high cholesterol, smoker, or strong family history of heart disease)      High cholesterol   8. PULMONARY RISK FACTORS: \"Do you have any history of lung disease?\"  (e.g., blood " "clots in lung, asthma, emphysema, birth control pills)      no  9. CAUSE: \"What do you think is causing the chest pain?\"      unsure  10. OTHER SYMPTOMS: \"Do you have any other symptoms?\" (e.g., dizziness, nausea, vomiting, sweating, fever, difficulty breathing, cough)        no  11. PREGNANCY: \"Is there any chance you are pregnant?\" \"When was your last menstrual period?\"        no    Protocols used: CHEST PAIN-A-[place]      "

## 2021-12-12 DIAGNOSIS — E78.5 HYPERLIPIDEMIA LDL GOAL <130: ICD-10-CM

## 2021-12-13 RX ORDER — ROSUVASTATIN CALCIUM 5 MG/1
TABLET, COATED ORAL
Qty: 30 TABLET | Refills: 0 | Status: SHIPPED | OUTPATIENT
Start: 2021-12-13 | End: 2021-12-21

## 2021-12-13 NOTE — TELEPHONE ENCOUNTER
Authorized per nursing refill protocol.  Ruthie Patten, RN  [facility]

## 2021-12-21 ENCOUNTER — OFFICE VISIT (OUTPATIENT)
Dept: PEDIATRICS | Facility: CLINIC | Age: 61
End: 2021-12-21
Payer: COMMERCIAL

## 2021-12-21 VITALS
WEIGHT: 187 LBS | SYSTOLIC BLOOD PRESSURE: 130 MMHG | HEART RATE: 62 BPM | HEIGHT: 69 IN | DIASTOLIC BLOOD PRESSURE: 80 MMHG | TEMPERATURE: 97.8 F | OXYGEN SATURATION: 100 % | BODY MASS INDEX: 27.7 KG/M2

## 2021-12-21 DIAGNOSIS — F41.9 ANXIETY: ICD-10-CM

## 2021-12-21 DIAGNOSIS — R07.89 ATYPICAL CHEST PAIN: ICD-10-CM

## 2021-12-21 DIAGNOSIS — R25.2 LEG CRAMPS: ICD-10-CM

## 2021-12-21 DIAGNOSIS — G43.809 OTHER MIGRAINE WITHOUT STATUS MIGRAINOSUS, NOT INTRACTABLE: ICD-10-CM

## 2021-12-21 DIAGNOSIS — K51.90 ULCERATIVE COLITIS WITHOUT COMPLICATIONS, UNSPECIFIED LOCATION (H): ICD-10-CM

## 2021-12-21 DIAGNOSIS — Z00.00 ROUTINE GENERAL MEDICAL EXAMINATION AT A HEALTH CARE FACILITY: Primary | ICD-10-CM

## 2021-12-21 DIAGNOSIS — E78.5 HYPERLIPIDEMIA LDL GOAL <130: ICD-10-CM

## 2021-12-21 PROCEDURE — 99396 PREV VISIT EST AGE 40-64: CPT | Performed by: INTERNAL MEDICINE

## 2021-12-21 PROCEDURE — 36415 COLL VENOUS BLD VENIPUNCTURE: CPT | Performed by: INTERNAL MEDICINE

## 2021-12-21 PROCEDURE — 80061 LIPID PANEL: CPT | Performed by: INTERNAL MEDICINE

## 2021-12-21 PROCEDURE — 80053 COMPREHEN METABOLIC PANEL: CPT | Performed by: INTERNAL MEDICINE

## 2021-12-21 PROCEDURE — 99214 OFFICE O/P EST MOD 30 MIN: CPT | Mod: 25 | Performed by: INTERNAL MEDICINE

## 2021-12-21 PROCEDURE — 82728 ASSAY OF FERRITIN: CPT | Performed by: INTERNAL MEDICINE

## 2021-12-21 RX ORDER — OXYCODONE HYDROCHLORIDE 5 MG/1
5-10 TABLET ORAL EVERY 6 HOURS PRN
Qty: 45 TABLET | Refills: 0 | Status: SHIPPED | OUTPATIENT
Start: 2021-12-21 | End: 2022-12-28

## 2021-12-21 RX ORDER — RIZATRIPTAN BENZOATE 10 MG/1
TABLET ORAL
Qty: 18 TABLET | Refills: 11 | Status: SHIPPED | OUTPATIENT
Start: 2021-12-21 | End: 2022-12-28

## 2021-12-21 RX ORDER — ROSUVASTATIN CALCIUM 5 MG/1
5 TABLET, COATED ORAL DAILY
Qty: 90 TABLET | Refills: 3 | Status: SHIPPED | OUTPATIENT
Start: 2021-12-21 | End: 2022-12-28

## 2021-12-21 RX ORDER — ONDANSETRON 4 MG/1
4-8 TABLET, ORALLY DISINTEGRATING ORAL EVERY 8 HOURS PRN
Qty: 30 TABLET | Refills: 3 | Status: SHIPPED | OUTPATIENT
Start: 2021-12-21 | End: 2022-12-28

## 2021-12-21 RX ORDER — CITALOPRAM HYDROBROMIDE 20 MG/1
30 TABLET ORAL DAILY
Qty: 45 TABLET | Refills: 11 | Status: SHIPPED | OUTPATIENT
Start: 2021-12-21 | End: 2022-12-28

## 2021-12-21 ASSESSMENT — MIFFLIN-ST. JEOR: SCORE: 1467.61

## 2021-12-21 ASSESSMENT — ANXIETY QUESTIONNAIRES
IF YOU CHECKED OFF ANY PROBLEMS ON THIS QUESTIONNAIRE, HOW DIFFICULT HAVE THESE PROBLEMS MADE IT FOR YOU TO DO YOUR WORK, TAKE CARE OF THINGS AT HOME, OR GET ALONG WITH OTHER PEOPLE: SOMEWHAT DIFFICULT
6. BECOMING EASILY ANNOYED OR IRRITABLE: SEVERAL DAYS
GAD7 TOTAL SCORE: 7
1. FEELING NERVOUS, ANXIOUS, OR ON EDGE: SEVERAL DAYS
2. NOT BEING ABLE TO STOP OR CONTROL WORRYING: SEVERAL DAYS
7. FEELING AFRAID AS IF SOMETHING AWFUL MIGHT HAPPEN: SEVERAL DAYS
5. BEING SO RESTLESS THAT IT IS HARD TO SIT STILL: SEVERAL DAYS
3. WORRYING TOO MUCH ABOUT DIFFERENT THINGS: SEVERAL DAYS

## 2021-12-21 ASSESSMENT — PATIENT HEALTH QUESTIONNAIRE - PHQ9
5. POOR APPETITE OR OVEREATING: SEVERAL DAYS
SUM OF ALL RESPONSES TO PHQ QUESTIONS 1-9: 7

## 2021-12-21 NOTE — PATIENT INSTRUCTIONS
1. Routine general medical examination at a health care facility  Completed this today    2. Atypical chest pain  Monitor - may be reflux.  If this recurs, let me know and we can reassess whether a stress test is needed.    3. Anxiety  Continue medications  - citalopram (CELEXA) 20 MG tablet; Take 1.5 tablets (30 mg) by mouth daily  Dispense: 45 tablet; Refill: 11    4. Hyperlipidemia LDL goal <130  Will recheck labs today - I will let you know if we should change your medication dosing    5. Other migraine without status migrainosus, not intractable  Refilled medications today    6. Leg cramps  Will check your iron stores today to see if this is contributing to your leg cramps  - Ferritin; Future    7. Ulcerative colitis without complications, unspecified location (H)  Referral to [facility] for surveillance      Preventive Health Recommendations  Female Ages 50 - 64    Yearly exam: See your health care provider every year in order to  o Review health changes.   o Discuss preventive care.    o Review your medicines if your doctor has prescribed any.      Get a Pap test every three years (unless you have an abnormal result and your provider advises testing more often).    If you get Pap tests with HPV test, you only need to test every 5 years, unless you have an abnormal result.     You do not need a Pap test if your uterus was removed (hysterectomy) and you have not had cancer.    You should be tested each year for STDs (sexually transmitted diseases) if you're at risk.     Have a mammogram every 1 to 2 years.    Have a colonoscopy at age 50, or have a yearly FIT test (stool test). These exams screen for colon cancer.      Have a cholesterol test every 5 years, or more often if advised.    Have a diabetes test (fasting glucose) every three years. If you are at risk for diabetes, you should have this test more often.     If you are at risk for osteoporosis (brittle bone disease), think about having a bone density scan  (DEXA).    Shots: Get a flu shot each year. Get a tetanus shot every 10 years.    Nutrition:     Eat at least 5 servings of fruits and vegetables each day.    Eat whole-grain bread, whole-wheat pasta and brown rice instead of white grains and rice.    Get adequate Calcium and Vitamin D.     Lifestyle    Exercise at least 150 minutes a week (30 minutes a day, 5 days a week). This will help you control your weight and prevent disease.    Limit alcohol to one drink per day.    No smoking.     Wear sunscreen to prevent skin cancer.     See your dentist every six months for an exam and cleaning.    See your eye doctor every 1 to 2 years.

## 2021-12-21 NOTE — PROGRESS NOTES
SUBJECTIVE:   CC: Joslyn Sanchez is an 61 year old woman who presents for preventive health visit.       Patient has been advised of split billing requirements and indicates understanding: Yes  Healthy Habits:    Getting at least 3 servings of Calcium per day:  Yes    Bi-annual eye exam:  Yes    Dental care twice a year:  Yes    Sleep apnea or symptoms of sleep apnea:  Sleep apnea    Diet:  Regular (no restrictions) and Low fat/cholesterol    Frequency of exercise:  4-5 days/week    Duration of exercise:  15-30 minutes    Taking medications regularly:  Yes    Barriers to taking medications:  Not applicable    Medication side effects:  Not applicable    PHQ-2 Total Score:    Additional concerns today:  Yes (leg cramps, stress test needed. )    Chest pain -   EKG showed QT interval 410   Happened out of nowhere - was not exertional in nature  Lasted a few days  Has exercised since - walks about 3.2 miles a day with her dog    Leg pain - switched from lipitor to crestor 5 mg daily  - no improvement    The 10-year ASCVD risk score (Jese JANSEN Jr., et al., 2013) is: 4.1%    Values used to calculate the score:      Age: 61 years      Sex: Female      Is Non- : No      Diabetic: No      Tobacco smoker: No      Systolic Blood Pressure: 130 mmHg      Is BP treated: No      HDL Cholesterol: 64 mg/dL      Total Cholesterol: 185 mg/dL          Today's PHQ-2 Score:   PHQ-2 ( 1999 Pfizer) 12/18/2020   Q1: Little interest or pleasure in doing things 0   Q2: Feeling down, depressed or hopeless 0   PHQ-2 Score 0   PHQ-2 Total Score (12-17 Years)- Positive if 3 or more points; Administer PHQ-A if positive 0   Q1: Little interest or pleasure in doing things Not at all   Q2: Feeling down, depressed or hopeless Not at all   PHQ-2 Score 0       Abuse: Current or Past (Physical, Sexual or Emotional) - No  Do you feel safe in your environment? Yes    Have you ever done Advance Care Planning? (For example, a  Health Directive, POLST, or a discussion with a medical provider or your loved ones about your wishes): No, advance care planning information given to patient to review.  Patient declined advance care planning discussion at this time.    Social History     Tobacco Use     Smoking status: Former Smoker     Types: Cigarettes     Smokeless tobacco: Never Used     Tobacco comment: Quit 28 years ago   Substance Use Topics     Alcohol use: Yes     Alcohol/week: 0.0 standard drinks     Comment: socially      If you drink alcohol do you typically have >3 drinks per day or >7 drinks per week? No    Alcohol Use 12/18/2020   Prescreen: >3 drinks/day or >7 drinks/week? No   Prescreen: >3 drinks/day or >7 drinks/week? -       Reviewed orders with patient.  Reviewed health maintenance and updated orders accordingly - Yes      Breast Cancer Screening:  Any new diagnosis of family breast, ovarian, or bowel cancer? No    FHS-7:   Breast CA Risk Assessment (FHS-7) 8/4/2021   Did any of your first-degree relatives have breast or ovarian cancer? No   Did any of your relatives have bilateral breast cancer? No   Did any man in your family have breast cancer? No   Did any woman in your family have breast and ovarian cancer? No   Did any woman in your family have breast cancer before age 50 y? No   Do you have 2 or more relatives with breast and/or ovarian cancer? No   Do you have 2 or more relatives with breast and/or bowel cancer? No       Mammogram Screening: Recommended mammography every 1-2 years with patient discussion and risk factor consideration  Pertinent mammograms are reviewed under the imaging tab.    History of abnormal Pap smear: NO - age 30-65 PAP every 5 years with negative HPV co-testing recommended     Reviewed and updated as needed this visit by clinical staff  Tobacco  Allergies    Med Hx           Reviewed and updated as needed this visit by Provider                   Review of Systems  All other systems on a  "10-point review are negative, unless otherwise noted in HPI       OBJECTIVE:   /80   Pulse 62   Temp 97.8  F (36.6  C) (Tympanic)   Ht 1.753 m (5' 9\")   Wt 84.8 kg (187 lb)   LMP 02/09/2014   SpO2 100%   BMI 27.62 kg/m    Physical Exam  GENERAL: healthy, alert and no distress  EYES: Eyes grossly normal to inspection, PERRL and conjunctivae and sclerae normal  HENT: ear canals and TM's normal, nose and mouth without ulcers or lesions  NECK: no adenopathy, no asymmetry, masses, or scars and thyroid normal to palpation  RESP: lungs clear to auscultation - no rales, rhonchi or wheezes  CV: regular rate and rhythm, normal S1 S2, no S3 or S4, no murmur, click or rub, no peripheral edema and peripheral pulses strong  ABDOMEN: soft, nontender, no hepatosplenomegaly, no masses and bowel sounds normal  MS: no gross musculoskeletal defects noted, no edema  SKIN: no suspicious lesions or rashes  NEURO: Normal strength and tone, mentation intact and speech normal  PSYCH: mentation appears normal, affect normal/bright    Diagnostic Test Results:  Labs reviewed in Epic    ASSESSMENT/PLAN:     New pt here to establish care and for annual physical - has additional questions.      ICD-10-CM    1. Routine general medical examination at a health care facility  Z00.00    2. Atypical chest pain  R07.89    3. Anxiety  F41.9 citalopram (CELEXA) 20 MG tablet   4. Hyperlipidemia LDL goal <130  E78.5 Lipid panel reflex to direct LDL Fasting     Comprehensive metabolic panel (BMP + Alb, Alk Phos, ALT, AST, Total. Bili, TP)     rosuvastatin (CRESTOR) 5 MG tablet     Lipid panel reflex to direct LDL Fasting     Comprehensive metabolic panel (BMP + Alb, Alk Phos, ALT, AST, Total. Bili, TP)   5. Other migraine without status migrainosus, not intractable  G43.809 rizatriptan (MAXALT) 10 MG tablet     ondansetron (ZOFRAN ODT) 4 MG ODT tab     oxyCODONE (ROXICODONE) 5 MG tablet   6. Leg cramps  R25.2 Ferritin     Ferritin   7. Ulcerative " colitis without complications, unspecified location (H)  K51.90 Adult Gastro Ref - Procedure Only     Patient Instructions   1. Routine general medical examination at a health care facility  Completed this today    2. Atypical chest pain  Monitor - may be reflux.  If this recurs, let me know and we can reassess whether a stress test is needed.    3. Anxiety  Continue medications  - citalopram (CELEXA) 20 MG tablet; Take 1.5 tablets (30 mg) by mouth daily  Dispense: 45 tablet; Refill: 11    4. Hyperlipidemia LDL goal <130  Will recheck labs today - I will let you know if we should change your medication dosing    5. Other migraine without status migrainosus, not intractable  Refilled medications today    6. Leg cramps  Will check your iron stores today to see if this is contributing to your leg cramps  - Ferritin; Future    7. Ulcerative colitis without complications, unspecified location (H)  Referral to [facility] for surveillance      Preventive Health Recommendations  Female Ages 50 - 64    Yearly exam: See your health care provider every year in order to  o Review health changes.   o Discuss preventive care.    o Review your medicines if your doctor has prescribed any.      Get a Pap test every three years (unless you have an abnormal result and your provider advises testing more often).    If you get Pap tests with HPV test, you only need to test every 5 years, unless you have an abnormal result.     You do not need a Pap test if your uterus was removed (hysterectomy) and you have not had cancer.    You should be tested each year for STDs (sexually transmitted diseases) if you're at risk.     Have a mammogram every 1 to 2 years.    Have a colonoscopy at age 50, or have a yearly FIT test (stool test). These exams screen for colon cancer.      Have a cholesterol test every 5 years, or more often if advised.    Have a diabetes test (fasting glucose) every three years. If you are at risk for diabetes, you should have  "this test more often.     If you are at risk for osteoporosis (brittle bone disease), think about having a bone density scan (DEXA).    Shots: Get a flu shot each year. Get a tetanus shot every 10 years.    Nutrition:     Eat at least 5 servings of fruits and vegetables each day.    Eat whole-grain bread, whole-wheat pasta and brown rice instead of white grains and rice.    Get adequate Calcium and Vitamin D.     Lifestyle    Exercise at least 150 minutes a week (30 minutes a day, 5 days a week). This will help you control your weight and prevent disease.    Limit alcohol to one drink per day.    No smoking.     Wear sunscreen to prevent skin cancer.     See your dentist every six months for an exam and cleaning.    See your eye doctor every 1 to 2 years.          Patient has been advised of split billing requirements and indicates understanding: No  COUNSELING:  Reviewed preventive health counseling, as reflected in patient instructions    Estimated body mass index is 27.62 kg/m  as calculated from the following:    Height as of this encounter: 1.753 m (5' 9\").    Weight as of this encounter: 84.8 kg (187 lb).        She reports that she has quit smoking. Her smoking use included cigarettes. She has never used smokeless tobacco.      Counseling Resources:  ATP IV Guidelines  Pooled Cohorts Equation Calculator  Breast Cancer Risk Calculator  BRCA-Related Cancer Risk Assessment: FHS-7 Tool  FRAX Risk Assessment  ICSI Preventive Guidelines  Dietary Guidelines for Americans, 2010  USDA's MyPlate  ASA Prophylaxis  Lung CA Screening    Nati Orellana MD  [facility] [place]  "

## 2021-12-22 LAB
ALBUMIN SERPL-MCNC: 4.2 G/DL (ref 3.4–5)
ALP SERPL-CCNC: 82 U/L (ref 40–150)
ALT SERPL W P-5'-P-CCNC: 29 U/L (ref 0–50)
ANION GAP SERPL CALCULATED.3IONS-SCNC: 7 MMOL/L (ref 3–14)
AST SERPL W P-5'-P-CCNC: 16 U/L (ref 0–45)
BILIRUB SERPL-MCNC: 1 MG/DL (ref 0.2–1.3)
BUN SERPL-MCNC: 13 MG/DL (ref 7–30)
CALCIUM SERPL-MCNC: 9.2 MG/DL (ref 8.5–10.1)
CHLORIDE BLD-SCNC: 108 MMOL/L (ref 94–109)
CO2 SERPL-SCNC: 25 MMOL/L (ref 20–32)
CREAT SERPL-MCNC: 0.83 MG/DL (ref 0.52–1.04)
FERRITIN SERPL-MCNC: 5 NG/ML (ref 8–252)
GFR SERPL CREATININE-BSD FRML MDRD: 79 ML/MIN/1.73M2
GLUCOSE BLD-MCNC: 98 MG/DL (ref 70–99)
POTASSIUM BLD-SCNC: 4 MMOL/L (ref 3.4–5.3)
PROT SERPL-MCNC: 7.5 G/DL (ref 6.8–8.8)
SODIUM SERPL-SCNC: 140 MMOL/L (ref 133–144)

## 2021-12-22 ASSESSMENT — ANXIETY QUESTIONNAIRES: GAD7 TOTAL SCORE: 7

## 2021-12-23 LAB
CHOLEST SERPL-MCNC: 217 MG/DL
FASTING STATUS PATIENT QL REPORTED: YES
HDLC SERPL-MCNC: 65 MG/DL
LDLC SERPL CALC-MCNC: 126 MG/DL
NONHDLC SERPL-MCNC: 152 MG/DL
TRIGL SERPL-MCNC: 129 MG/DL

## 2021-12-23 NOTE — RESULT ENCOUNTER NOTE
Please send result letter    Dear Joslyn,    1. Your ferritin (which represents your iron stores) is low.  This could be making your leg cramps at night worse.  I recommend you consider an iron supplement or trying to increase iron in your diet.  See below for tips on increasing iron intake.    Iron Supplements  Most people think of iron as a metal used to make pots, frying pans, and soup kettles. This same metal (or mineral) also plays a vital role in the body. Iron helps the blood cells carry oxygen. When you don't get enough iron, you may feel tired and lack energy. Over time, without enough iron, your body makes fewer red blood cells. This can cause a condition called iron-deficiency anemia. Since your body doesn't make iron, you must get it from foods or supplements.       Food sources of iron  Iron is found in a few types of foods. Good sources include:  Red meat, poultry, fish, eggs  Dried fruits (especially raisins, prunes, figs)  Legumes such as dried beans and lentils  Breads and cereals with iron added  Blackstrap molasses  Spinach  Foods cooked in cast-iron pans. This is especially true of acidic foods, such as tomatoes and ankur.    Why use a supplement?  Women often need additional iron because they lose blood during their menstrual period. But even grown men and boys may need a supplement at some point. You may need an iron supplement if any of the following is true for you:  I rarely eat foods that are high in iron (such as red meat, poultry, dried beans, and foods with iron added).  I am a vegetarian and I rarely eat legumes (dried beans, peas, lentils).  I have heavy menstrual periods.  I am pregnant or breastfeeding.  I have been diagnosed with iron-deficiency anemia.    If you take iron  Here are some tips to help you get the most from an iron supplement:  Take it with vitamin C for better absorption.  Don't take an iron supplement with milk. The calcium in milk limits iron absorption.  Iron  "supplements can cause constipation. To prevent this, drink plenty of water, eat high-fiber foods, and exercise often. Also try an iron supplement with an added stool softener.  Eat a healthy diet to get all the nutrients your body needs.    Recommended iron intake  The amount of iron each person needs is different, and varies by age. Women who are pregnant or breastfeeding need extra iron. But taking more than the suggested amount is not always healthy. Your healthcare provider can help you choose the right amount of iron for you.    2. The results of your recent lipid (cholesterol) profile were abnormal.  Specifically, your LDL (aka \"bad\" cholesterol) is elevated compared to last time you checked at 126.  Your HDL (aka \"good\" cholesterol) is at goal at >50.      At this time, I feel that the benefits of aggressive lifestyle changes over increasing the dose of your medication will have a greater impact on your overall health.    Here are some ways to improve your nutrition:  - Eat less fat (especially butter, Crisco and other saturated fats)  - Buy lean cuts of meat, reduce your portions of red meat or substitute poultry or fish  - Eat no more than 4 egg yolks per week  - Avoid fried or fast foods that are high in fat  - Eat more fruits and vegetables  - Reduce the percent of calories from saturated fat and trans fat (to less than 5-10% of total calories consumed)  - Limits intake of sweets, sugar-sweetened beverages, and red meats  - Increase intake of leafy green vegetables, fruits, and whole grains.  - A healthy balanced diet can include low-fat dairy products, poultry, fish, legumes, nontropical vegetable oils, and nuts    Also consider starting or increasing your aerobic activity. Aerobic activity is the best way to improve HDL (good) cholesterol.   - 3 or 4 physical activity sessions/week  - Average duration 40 minutes/session  - Activity should be moderate-to-vigorous intensity (I recommend a mixture of " cardiovascular and strength training).      The remainder of your lab results are normal.  Please feel free to call with any questions.  Otherwise, we can discuss further at your next appointment.    Sincerely,    Nati Orellana MD

## 2021-12-24 ENCOUNTER — TELEPHONE (OUTPATIENT)
Dept: PEDIATRICS | Facility: CLINIC | Age: 61
End: 2021-12-24
Payer: COMMERCIAL

## 2021-12-24 NOTE — TELEPHONE ENCOUNTER
Central Prior Authorization Team   Phone: [phone]    PA Initiation    Medication: oxyCODONE (ROXICODONE) 5 MG tablet  Insurance Company: [employer] - Phone [phone] Fax [phone]  Pharmacy Filling the Rx: [id number] - [place], [place] - [address] AT [facility]  Filling Pharmacy Phone: [phone]  Filling Pharmacy Fax:    Start Date: 12/24/2021

## 2021-12-24 NOTE — TELEPHONE ENCOUNTER
Prior Authorization Retail Medication Request    Medication/Dose: oxyCODONE (ROXICODONE) 5 MG tablet  ICD code (if different than what is on RX):  [G43.809]   Previously Tried and Failed:  rizatriptan (MAXALT) 10 MG tablet  Rationale:  n/a    Insurance Name:  [facility]  Insurance ID:  [id number]      Pharmacy Information (if different than what is on RX)  Name:  [id number] - [place], [place] - [address] AT [facility]  Phone:  [phone]

## 2021-12-27 NOTE — TELEPHONE ENCOUNTER
PRIOR AUTHORIZATION DENIED    Medication: oxyCODONE (ROXICODONE) 5 MG tablet    Denial Date: 12/27/2021    Denial Rational:       Appeal Information: This medication was denied. If physician would like to appeal because patient has contraindication or allergy to covered medication please write letter of medical necessity and route back to PA team to initiate.  If no further action is needed please close encounter thank you.

## 2021-12-28 NOTE — TELEPHONE ENCOUNTER
What are the covered alternatives?  Are you able to find documentation about contraindications she has to the covered alternatives?  Can the pt wait until Antonia gets back?

## 2021-12-28 NOTE — TELEPHONE ENCOUNTER
Call placed to pt   Pt states that she picked up the oxycodone, zofran and maxalt last week without any issues    She only fills it once year  She takes it only if her migraine gets really severe    Routing to covering provider as MARLIN    Thank you  Too Nichols RN on 12/28/2021 at 9:37 AM

## 2022-08-23 ENCOUNTER — TRANSFERRED RECORDS (OUTPATIENT)
Dept: HEALTH INFORMATION MANAGEMENT | Facility: CLINIC | Age: 62
End: 2022-08-23

## 2022-08-31 ENCOUNTER — TRANSFERRED RECORDS (OUTPATIENT)
Dept: HEALTH INFORMATION MANAGEMENT | Facility: CLINIC | Age: 62
End: 2022-08-31

## 2022-10-10 ENCOUNTER — TRANSFERRED RECORDS (OUTPATIENT)
Dept: HEALTH INFORMATION MANAGEMENT | Facility: CLINIC | Age: 62
End: 2022-10-10

## 2022-10-14 ENCOUNTER — TRANSFERRED RECORDS (OUTPATIENT)
Dept: HEALTH INFORMATION MANAGEMENT | Facility: CLINIC | Age: 62
End: 2022-10-14

## 2022-11-10 ENCOUNTER — ANCILLARY PROCEDURE (OUTPATIENT)
Dept: MAMMOGRAPHY | Facility: CLINIC | Age: 62
End: 2022-11-10
Attending: INTERNAL MEDICINE
Payer: COMMERCIAL

## 2022-11-10 DIAGNOSIS — Z12.31 VISIT FOR SCREENING MAMMOGRAM: ICD-10-CM

## 2022-11-10 PROCEDURE — 77067 SCR MAMMO BI INCL CAD: CPT | Mod: TC | Performed by: RADIOLOGY

## 2022-12-27 ENCOUNTER — NURSE TRIAGE (OUTPATIENT)
Dept: NURSING | Facility: CLINIC | Age: 62
End: 2022-12-27

## 2022-12-27 NOTE — TELEPHONE ENCOUNTER
Nurse Triage SBAR    Situation/background: Patient has been having chest pressure and tenderness since 12/25 AM.     Assessment: Patient denies any difficulty breathing. Pain is 4/10. Deep breaths make it worse. Hugging hurts because of the tenderness.     She reports this same thing happening a year ago. She was seen at [facility] who did a cardiac work up and ruled everything out. They recommended getting an Echo. Patient saw PCP 2 weeks after that, who did not feel Echo was necessary.    Protocol Recommended Disposition:   Go to ED Now (Or PCP Triage)    Recommendation: Patient declines anything but a clinic appt. Refuses ED or U[facility]. Reinforced recommendation. Patient would like a clinic appt this week to get an Echo ordered.  Advised I could send a message to her PCP for their recommendation, however, cannot guarantee they would have an opening for her.     Callback# [phone], okay to leave a detailed message.     Jasbir Evans RN on 12/27/2022 at 5:10 PM    Reason for Disposition    Taking a deep breath makes pain worse    Additional Information    Negative: SEVERE difficulty breathing (e.g., struggling for each breath, speaks in single words)    Negative: Difficult to awaken or acting confused (e.g., disoriented, slurred speech)    Negative: Shock suspected (e.g., cold/pale/clammy skin, too weak to stand, low BP, rapid pulse)    Negative: Passed out (i.e., lost consciousness, collapsed and was not responding)    Negative: [1] Chest pain lasts > 5 minutes AND [2] age > 44    Negative: [1] Chest pain lasts > 5 minutes AND [2] age > 30 AND [3] one or more cardiac risk factors (e.g., diabetes, high blood pressure, high cholesterol, smoker, or strong family history of heart disease)    Negative: [1] Chest pain lasts > 5 minutes AND [2] history of heart disease (i.e., angina, heart attack, heart failure, bypass surgery, takes nitroglycerin)    Negative: [1] Chest pain lasts > 5 minutes AND [2] described  "as crushing, pressure-like, or heavy    Negative: Heart beating < 50 beats per minute OR > 140 beats per minute    Negative: Visible sweat on face or sweat dripping down face    Negative: Sounds like a life-threatening emergency to the triager    Negative: Followed a chest injury    Negative: SEVERE chest pain    Negative: [1] Chest pain (or \"angina\") comes and goes AND [2] is happening more often (increasing in frequency) or getting worse (increasing in severity) (Exception: chest pains that last only a few seconds)    Negative: Pain also in shoulder(s) or arm(s) or jaw (Exception: pain is clearly made worse by movement)    Negative: Difficulty breathing    Negative: Dizziness or lightheadedness    Negative: Coughing up blood    Negative: Cocaine use within last 3 days    Negative: Major surgery in past month    Negative: Hip or leg fracture (broken bone) in past month (or had cast on leg or ankle in past month)    Negative: Illness requiring prolonged bedrest in past month (e.g., immobilization, long hospital stay)    Negative: Long-distance travel in past month (e.g., car, bus, train, plane; with trip lasting 6 or more hours)    Negative: History of prior \"blood clot\" in leg or lungs (i.e., deep vein thrombosis, pulmonary embolism)    Negative: History of inherited increased risk of blood clots (e.g., Factor 5 Leiden, Anti-thrombin 3, Protein C or Protein S deficiency, Prothrombin mutation)    Negative: Cancer treatment in past six months (or has cancer now)    Negative: [1] Chest pain lasts > 5 minutes AND [2] occurred in past 3 days (72 hours) (Exception: feels exactly the same as previously diagnosed heartburn and has accompanying sour taste in mouth)    Protocols used: CHEST PAIN-A-AH      "

## 2022-12-27 NOTE — TELEPHONE ENCOUNTER
Huddled with Dr. Machado, ok to wait until tomorrow for an appointment.     Called patient and scheduled her for an appointment tomorrow at 10:10am. Reviewed concerning signs and symptoms that would warrant an ER visit.     Bing Sapp RN on 12/27/2022 at 5:48 PM

## 2022-12-28 ENCOUNTER — ANCILLARY PROCEDURE (OUTPATIENT)
Dept: GENERAL RADIOLOGY | Facility: CLINIC | Age: 62
End: 2022-12-28
Attending: PHYSICIAN ASSISTANT
Payer: COMMERCIAL

## 2022-12-28 ENCOUNTER — OFFICE VISIT (OUTPATIENT)
Dept: PEDIATRICS | Facility: CLINIC | Age: 62
End: 2022-12-28
Payer: COMMERCIAL

## 2022-12-28 VITALS
SYSTOLIC BLOOD PRESSURE: 128 MMHG | HEART RATE: 59 BPM | DIASTOLIC BLOOD PRESSURE: 84 MMHG | TEMPERATURE: 97.1 F | OXYGEN SATURATION: 99 %

## 2022-12-28 DIAGNOSIS — F41.9 ANXIETY: ICD-10-CM

## 2022-12-28 DIAGNOSIS — E83.52 HYPERCALCEMIA: ICD-10-CM

## 2022-12-28 DIAGNOSIS — R07.89 CHEST WALL PAIN: ICD-10-CM

## 2022-12-28 DIAGNOSIS — R07.9 CHEST PAIN, UNSPECIFIED TYPE: Primary | ICD-10-CM

## 2022-12-28 DIAGNOSIS — E78.5 HYPERLIPIDEMIA LDL GOAL <130: ICD-10-CM

## 2022-12-28 DIAGNOSIS — G43.809 OTHER MIGRAINE WITHOUT STATUS MIGRAINOSUS, NOT INTRACTABLE: ICD-10-CM

## 2022-12-28 LAB
ALBUMIN SERPL BCG-MCNC: 4.8 G/DL (ref 3.5–5.2)
ALP SERPL-CCNC: 82 U/L (ref 35–104)
ALT SERPL W P-5'-P-CCNC: 22 U/L (ref 10–35)
ANION GAP SERPL CALCULATED.3IONS-SCNC: 15 MMOL/L (ref 7–15)
AST SERPL W P-5'-P-CCNC: 27 U/L (ref 10–35)
BASOPHILS # BLD AUTO: 0.1 10E3/UL (ref 0–0.2)
BASOPHILS NFR BLD AUTO: 1 %
BILIRUB SERPL-MCNC: 0.9 MG/DL
BUN SERPL-MCNC: 10.9 MG/DL (ref 8–23)
CALCIUM SERPL-MCNC: 10.3 MG/DL (ref 8.8–10.2)
CHLORIDE SERPL-SCNC: 104 MMOL/L (ref 98–107)
CREAT SERPL-MCNC: 0.8 MG/DL (ref 0.51–0.95)
DEPRECATED HCO3 PLAS-SCNC: 21 MMOL/L (ref 22–29)
EOSINOPHIL # BLD AUTO: 0.1 10E3/UL (ref 0–0.7)
EOSINOPHIL NFR BLD AUTO: 1 %
ERYTHROCYTE [DISTWIDTH] IN BLOOD BY AUTOMATED COUNT: 12.6 % (ref 10–15)
GFR SERPL CREATININE-BSD FRML MDRD: 82 ML/MIN/1.73M2
GLUCOSE SERPL-MCNC: 94 MG/DL (ref 70–99)
HCT VFR BLD AUTO: 40.3 % (ref 35–47)
HGB BLD-MCNC: 13.6 G/DL (ref 11.7–15.7)
LYMPHOCYTES # BLD AUTO: 2.3 10E3/UL (ref 0.8–5.3)
LYMPHOCYTES NFR BLD AUTO: 37 %
MCH RBC QN AUTO: 27 PG (ref 26.5–33)
MCHC RBC AUTO-ENTMCNC: 33.7 G/DL (ref 31.5–36.5)
MCV RBC AUTO: 80 FL (ref 78–100)
MONOCYTES # BLD AUTO: 0.7 10E3/UL (ref 0–1.3)
MONOCYTES NFR BLD AUTO: 11 %
NEUTROPHILS # BLD AUTO: 3 10E3/UL (ref 1.6–8.3)
NEUTROPHILS NFR BLD AUTO: 49 %
PLATELET # BLD AUTO: 246 10E3/UL (ref 150–450)
POTASSIUM SERPL-SCNC: 5 MMOL/L (ref 3.4–5.3)
PROT SERPL-MCNC: 7.3 G/DL (ref 6.4–8.3)
RBC # BLD AUTO: 5.03 10E6/UL (ref 3.8–5.2)
SODIUM SERPL-SCNC: 140 MMOL/L (ref 136–145)
TSH SERPL DL<=0.005 MIU/L-ACNC: 1 UIU/ML (ref 0.3–4.2)
WBC # BLD AUTO: 6.1 10E3/UL (ref 4–11)

## 2022-12-28 PROCEDURE — 36415 COLL VENOUS BLD VENIPUNCTURE: CPT | Performed by: PHYSICIAN ASSISTANT

## 2022-12-28 PROCEDURE — 99214 OFFICE O/P EST MOD 30 MIN: CPT | Performed by: PHYSICIAN ASSISTANT

## 2022-12-28 PROCEDURE — 71046 X-RAY EXAM CHEST 2 VIEWS: CPT | Mod: TC | Performed by: RADIOLOGY

## 2022-12-28 PROCEDURE — 93000 ELECTROCARDIOGRAM COMPLETE: CPT | Performed by: PHYSICIAN ASSISTANT

## 2022-12-28 PROCEDURE — 80050 GENERAL HEALTH PANEL: CPT | Performed by: PHYSICIAN ASSISTANT

## 2022-12-28 RX ORDER — RIZATRIPTAN BENZOATE 10 MG/1
TABLET ORAL
Qty: 18 TABLET | Refills: 11 | Status: SHIPPED | OUTPATIENT
Start: 2022-12-28 | End: 2024-01-11

## 2022-12-28 RX ORDER — OXYCODONE HYDROCHLORIDE 5 MG/1
5-10 TABLET ORAL EVERY 6 HOURS PRN
Qty: 45 TABLET | Refills: 0 | Status: SHIPPED | OUTPATIENT
Start: 2022-12-28 | End: 2023-12-05

## 2022-12-28 RX ORDER — ROSUVASTATIN CALCIUM 5 MG/1
5 TABLET, COATED ORAL DAILY
Qty: 90 TABLET | Refills: 3 | Status: SHIPPED | OUTPATIENT
Start: 2022-12-28 | End: 2023-01-10

## 2022-12-28 RX ORDER — ONDANSETRON 4 MG/1
4-8 TABLET, ORALLY DISINTEGRATING ORAL EVERY 8 HOURS PRN
Qty: 30 TABLET | Refills: 3 | Status: SHIPPED | OUTPATIENT
Start: 2022-12-28 | End: 2024-07-09

## 2022-12-28 RX ORDER — CITALOPRAM HYDROBROMIDE 20 MG/1
30 TABLET ORAL DAILY
Qty: 45 TABLET | Refills: 11 | Status: SHIPPED | OUTPATIENT
Start: 2022-12-28 | End: 2024-01-02

## 2022-12-28 ASSESSMENT — PAIN SCALES - GENERAL: PAINLEVEL: NO PAIN (0)

## 2022-12-28 NOTE — PROGRESS NOTES
Assessment & Plan     Chest pain, unspecified type  Proceed with stress test to r/o cardiac etiology.  - Exercise Stress Test - Adult; Future    Anxiety  Refills given. Upcoming physical scheduled.  - citalopram (CELEXA) 20 MG tablet; Take 1.5 tablets (30 mg) by mouth daily    Other migraine without status migrainosus, not intractable  - rizatriptan (MAXALT) 10 MG tablet; TAKE ONE TABLET BY MOUTH AT ONSET OF MIGRAINE MAY REPEAT DOSE IN 2 HOURS MAX 30MG IN 24 HOURS  - ondansetron (ZOFRAN ODT) 4 MG ODT tab; Take 1-2 tablets (4-8 mg) by mouth every 8 hours as needed for nausea  - oxyCODONE (ROXICODONE) 5 MG tablet; Take 1-2 tablets (5-10 mg) by mouth every 6 hours as needed (intractable migraine)    Hyperlipidemia LDL goal <130  - rosuvastatin (CRESTOR) 5 MG tablet; Take 1 tablet (5 mg) by mouth daily    Hypercalcemia  - Basic metabolic panel  (Ca, Cl, CO2, Creat, Gluc, K, Na, BUN); Future  - Ionized Calcium; Future    BERT Hodgson [facility] [place] Jorgensen is a 62 year old, presenting for the following health issues:  Chest Wall Pain (While taking a deep breath)      HPI   Pain History:  When did you first notice your pain? - Acute Pain   Have you seen anyone else for your pain? Yes [facility] 11/[facility]  Where in your body do you have pain? Chest Pain  Onset/Duration: 12/25/22- three days ago  Description:   Location: left side  Character: achy and Dull  Radiation: on left side  Duration: constant   Intensity: moderate  Progression of Symptoms: worsening and constant  Accompanying Signs & Symptoms:  Shortness of breath: No  Sweating: No  Nausea/vomiting: No  Lightheadedness: YES  Palpitations: No  Fever/Chills: No  Cough: No           Heartburn: No  History:   Family history of heart disease: No  Grandparents with strokes and HLD.  Tobacco use: No  Previous similar symptoms: YES- Was seen a year ago for chest pain and recommended a stress  test  Precipitating factors:   Worse with exertion: YES  Worse with deep breaths: YES           Related to eating: No           Better with burping: No  Alleviating factors:   Therapies tried and outcome: Tums but nothing helps   No history of HTN, DM, former smoker, CAD, CVA.  History of HLD--on crestor.     Review of Systems   Constitutional, HEENT, cardiovascular, pulmonary, gi and gu systems are negative, except as otherwise noted.      Objective    /84 (BP Location: Right arm, Patient Position: Chair, Cuff Size: Adult Regular)   Pulse 59   Temp 97.1  F (36.2  C) (Tympanic)   LMP 02/09/2014   SpO2 99%   There is no height or weight on file to calculate BMI.  Physical Exam   GENERAL: alert and no distress  EYES: Eyes grossly normal to inspection, PERRL and conjunctivae and sclerae normal  HENT: ear canals and TM's normal, nose and mouth without ulcers or lesions  NECK: no adenopathy  RESP: lungs clear to auscultation - no rales, rhonchi or wheezes  CV: regular rate and rhythm, normal S1 S2, no S3 or S4  CHEST WALL: no reproducible pain upon palpation. Pain reproducible by deep inspiration  EKG reveals NSR  Results for orders placed or performed in visit on 12/28/22   XR Chest 2 Views     Status: None    Narrative    CHEST TWO VIEWS  12/28/2022 10:26 AM     HISTORY: Chest wall pain.    COMPARISON: None available.      Impression    IMPRESSION: PA and lateral views of the chest were obtained.  Cardiomediastinal silhouette is within normal limits. No suspicious  focal pulmonary opacities. No significant pleural effusion or  pneumothorax.    ROSANGELA MONGE MD         SYSTEM ID:  [id number]

## 2023-01-05 ENCOUNTER — HOSPITAL ENCOUNTER (OUTPATIENT)
Dept: CARDIOLOGY | Facility: CLINIC | Age: 63
Discharge: HOME | End: 2023-01-05
Attending: PHYSICIAN ASSISTANT
Payer: COMMERCIAL

## 2023-01-05 ENCOUNTER — DOCUMENTATION ONLY (OUTPATIENT)
Dept: CARDIOLOGY | Facility: CLINIC | Age: 63
End: 2023-01-05

## 2023-01-05 ENCOUNTER — LAB (OUTPATIENT)
Dept: LAB | Facility: CLINIC | Age: 63
End: 2023-01-05
Attending: PHYSICIAN ASSISTANT
Payer: COMMERCIAL

## 2023-01-05 ENCOUNTER — HOSPITAL ENCOUNTER (OUTPATIENT)
Dept: CARDIOLOGY | Facility: CLINIC | Age: 63
Discharge: HOME | End: 2023-01-05
Attending: INTERNAL MEDICINE
Payer: COMMERCIAL

## 2023-01-05 DIAGNOSIS — R07.9 CHEST PAIN, UNSPECIFIED TYPE: ICD-10-CM

## 2023-01-05 DIAGNOSIS — R07.89 ATYPICAL CHEST PAIN: ICD-10-CM

## 2023-01-05 DIAGNOSIS — E83.52 HYPERCALCEMIA: ICD-10-CM

## 2023-01-05 DIAGNOSIS — R07.89 ATYPICAL CHEST PAIN: Primary | ICD-10-CM

## 2023-01-05 DIAGNOSIS — R07.9 CHEST PAIN: ICD-10-CM

## 2023-01-05 LAB
ANION GAP SERPL CALCULATED.3IONS-SCNC: 10 MMOL/L (ref 7–15)
BUN SERPL-MCNC: 23.9 MG/DL (ref 8–23)
CA-I BLD-MCNC: 4.8 MG/DL (ref 4.4–5.2)
CALCIUM SERPL-MCNC: 9.9 MG/DL (ref 8.8–10.2)
CHLORIDE SERPL-SCNC: 104 MMOL/L (ref 98–107)
CREAT SERPL-MCNC: 0.97 MG/DL (ref 0.51–0.95)
CRP SERPL-MCNC: <3 MG/L
D DIMER PPP FEU-MCNC: 0.34 UG/ML FEU (ref 0–0.5)
DEPRECATED HCO3 PLAS-SCNC: 27 MMOL/L (ref 22–29)
ERYTHROCYTE [SEDIMENTATION RATE] IN BLOOD BY WESTERGREN METHOD: 4 MM/HR (ref 0–30)
GFR SERPL CREATININE-BSD FRML MDRD: 65 ML/MIN/1.73M2
GLUCOSE SERPL-MCNC: 102 MG/DL (ref 70–99)
LVEF ECHO: NORMAL
POTASSIUM SERPL-SCNC: 4.5 MMOL/L (ref 3.4–5.3)
SODIUM SERPL-SCNC: 141 MMOL/L (ref 136–145)
TROPONIN T SERPL HS-MCNC: 13 NG/L

## 2023-01-05 PROCEDURE — 85652 RBC SED RATE AUTOMATED: CPT

## 2023-01-05 PROCEDURE — 84484 ASSAY OF TROPONIN QUANT: CPT

## 2023-01-05 PROCEDURE — 93306 TTE W/DOPPLER COMPLETE: CPT | Mod: 26 | Performed by: INTERNAL MEDICINE

## 2023-01-05 PROCEDURE — 36415 COLL VENOUS BLD VENIPUNCTURE: CPT

## 2023-01-05 PROCEDURE — 86140 C-REACTIVE PROTEIN: CPT

## 2023-01-05 PROCEDURE — 93306 TTE W/DOPPLER COMPLETE: CPT

## 2023-01-05 PROCEDURE — 93016 CV STRESS TEST SUPVJ ONLY: CPT | Performed by: INTERNAL MEDICINE

## 2023-01-05 PROCEDURE — 93017 CV STRESS TEST TRACING ONLY: CPT

## 2023-01-05 PROCEDURE — 82330 ASSAY OF CALCIUM: CPT

## 2023-01-05 PROCEDURE — 93018 CV STRESS TEST I&R ONLY: CPT | Performed by: INTERNAL MEDICINE

## 2023-01-05 PROCEDURE — 85379 FIBRIN DEGRADATION QUANT: CPT

## 2023-01-05 PROCEDURE — 80051 ELECTROLYTE PANEL: CPT

## 2023-01-05 NOTE — PROGRESS NOTES
Brief cardiology note:    Alerted by cardiopulmonary staff regarding an abnormal stress test.  Patient presented for an outpatient exercise ECG treadmill stress test for chest pain.  Patient has been experiencing intermittent pleuritic chest pain for the past 2 weeks, initially worse around Adán time, gradually improving, though still intermittently present with deep inspiration.  Chest discomfort is not exertional.  Patient is able to exercise for 9 minutes and 30 seconds before stopping due to fatigue.    Stress test was abnormal, with ST segment depression in the inferior leads beginning at 5 minutes and 23 seconds, at peak exercise there were 1 mm horizontal ST segment depressions in leads II, 3, aVF, V3 through V6, these ST segments improved to baseline by 2 minutes into recovery.  No change in chest pain with exercise.  We discussed options for further evaluation and management, discussed that our general recommendation for patients with chest discomfort and an abnormal stress test would be to go to the ED for further evaluation management.  Patient was not interested in this, and so alternatively offered an expedited outpatient work-up with labs, echocardiogram, and follow-up with me in clinic on Tuesday.  Patient patient did not want to go to the ED, and so opted for the latter outpatient strategy, understanding risks associated with this including stroke, heart attack, heart failure, death.  Advised patient to refrain from strenuous exercise until then, and to have a low threshold to return to the ED with any worsening of symptoms or other symptoms concerning for her.    Teofilo Arriola MD, [facility]  Cardiology  January 5, 2023

## 2023-01-06 ENCOUNTER — TELEPHONE (OUTPATIENT)
Dept: CARDIOLOGY | Facility: CLINIC | Age: 63
End: 2023-01-06

## 2023-01-06 NOTE — TELEPHONE ENCOUNTER
Pt contacted by MD. Plan for pt to follow up outpatient as previously advised.   SANFORD Clark RN, BSN. 01/06/23  2:41 PM

## 2023-01-06 NOTE — TELEPHONE ENCOUNTER
Brief cardiology telephone note:    Called patient to discuss results of lab tests, normal troponin, normal D-dimer, negative CRP, negative ESR.  Discussed echocardiogram findings demonstrating normal biventricular function, LVEF 60 to 65%, no regional wall motion abnormalities noted.  No significant valvular abnormalities, just trace AI mild MR.  Patient feels well, scheduled to see me on Tuesday.  Advised patient to seek medical care if she develops any severe chest pain, chest pressure, or other symptoms concerning for her in the interim, she voiced understanding and was in agreement.    Teofilo Arriola MD, [facility]  Cardiology  January 6, 2023

## 2023-01-06 NOTE — TELEPHONE ENCOUNTER
Call received from pt to review recent TTE and labs completed yesterday. Pt reports that her primary care team called her this AM and recommended ER evaluation based on results of positive stress test. Per note from Dr. Arriola pt was recommended for ED evaluation but declined so outpatient expedited eval was ordered / completed with results as follows:     Component      Latest Ref Rng & Units 1/5/2023   Sodium      136 - 145 mmol/L 141   Potassium      3.4 - 5.3 mmol/L 4.5   Chloride      98 - 107 mmol/L 104   Carbon Dioxide (CO2)      22 - 29 mmol/L 27   Anion Gap      7 - 15 mmol/L 10   Urea Nitrogen      8.0 - 23.0 mg/dL 23.9 (H)   Creatinine      0.51 - 0.95 mg/dL 0.97 (H)   Calcium      8.8 - 10.2 mg/dL 9.9   Glucose      70 - 99 mg/dL 102 (H)   GFR Estimate      >60 mL/min/1.73m2 65   D-Dimer Quantitative      0.00 - 0.50 ug/mL FEU 0.34   Sed Rate      0 - 30 mm/hr 4   CRP Inflammation      <5.00 mg/L <3.00   Calcium Ionized Whole Blood      4.4 - 5.2 mg/dL 4.8   Troponin T, High Sensitivity      <=14 ng/L 13       TTE:   Interpretation Summary     Left ventricular systolic function is normal.The visual ejection fraction is  60-65%.  The right ventricular systolic function is normal.  There is trace aortic regurgitation.  Trivial pericardial effusion  The inferior vena cava was normal in size with preserved respiratory  Variability.    Pt received a call from PMD office this AM recommended ED based on positive stress test results. Pt previously advised OK for expiated workup and follow up Tuesday and is frustrated with contradicting recommendations. Routing results to Dr. Arriola to review if continued outpatient acceptable (not preferred per note yesterday as ED was recommended), as the current plan based on results fo outpatient testing completed thus far.     Routing to Dr. Arriola for review.   SANFORD Clark RN, BSN.

## 2023-01-10 ENCOUNTER — OFFICE VISIT (OUTPATIENT)
Dept: CARDIOLOGY | Facility: CLINIC | Age: 63
End: 2023-01-10
Payer: COMMERCIAL

## 2023-01-10 VITALS
HEIGHT: 69 IN | WEIGHT: 185.5 LBS | HEART RATE: 62 BPM | DIASTOLIC BLOOD PRESSURE: 62 MMHG | SYSTOLIC BLOOD PRESSURE: 116 MMHG | OXYGEN SATURATION: 98 % | BODY MASS INDEX: 27.47 KG/M2

## 2023-01-10 DIAGNOSIS — R94.39 ABNORMAL STRESS TEST: ICD-10-CM

## 2023-01-10 DIAGNOSIS — E78.5 HYPERLIPIDEMIA LDL GOAL <130: Primary | ICD-10-CM

## 2023-01-10 DIAGNOSIS — R07.89 LEFT CHEST PRESSURE: ICD-10-CM

## 2023-01-10 PROCEDURE — 93000 ELECTROCARDIOGRAM COMPLETE: CPT | Performed by: INTERNAL MEDICINE

## 2023-01-10 PROCEDURE — 99204 OFFICE O/P NEW MOD 45 MIN: CPT | Performed by: INTERNAL MEDICINE

## 2023-01-10 RX ORDER — ROSUVASTATIN CALCIUM 10 MG/1
10 TABLET, COATED ORAL AT BEDTIME
Qty: 90 TABLET | Refills: 3 | Status: SHIPPED | OUTPATIENT
Start: 2023-01-10 | End: 2024-02-07

## 2023-01-10 NOTE — LETTER
1/10/2023    [facility]  [address] [address]    RE: Joslyn CAROLE Daniel       Dear Colleague,     I had the pleasure of seeing Joslyn Sanchez in the [facility].      Cardiology Clinic Consultation:    January 10, 2023   Patient Name: Joslyn Sanchez  Patient MRN: [id number]    Consult indication: abnormal stress test    HPI:    I had the opportunity to see patient Joslyn Sanchez in cardiology clinic for a consultation. Patient is followed by our colleagues at [facility] with Primary Care.     As you know, patient is a pleasant 62-year-old female with a past medical history significant for dyslipidemia, obesity, sleep apnea (on CPAP), ulcerative colitis, who presents for further evaluation and management of chest discomfort and an abnormal stress test.    Patient reports that approximately 1 year ago at around Thanksgiving time, she was experiencing intermittent chest pressure/heaviness.  She was assessed with labs including a troponin that was normal, D-dimer that was normal, COVID-19 test was negative.  CBC was unremarkable, CMP notable for borderline elevated bilirubin, AST, ALT.  Follow-up CMP 12/21/2021 demonstrated normal liver enzymes.    Since then, she reports that she has been doing well.  Her son got  in the summer, and so she started exercising more in an effort to lose weight, cycling for 15 miles at a time without issue.  However she had a knee injury and so stopped exercising in the fall.    Over the holidays, she started developing recurrent intermittent chest heaviness/tightness.  The symptoms persisted for several days, waxing and waning in severity.  Symptoms were not necessarily exertional in nature, and sometimes worse with movement of her torso/shoulders.  For further evaluation, patient was assessed with an exercise ECG treadmill stress test 1/5/2023 that was abnormal, with 1 mm  horizontal ST segment depression in leads II, III, aVF, and V5 through V6, return to baseline at 4 minutes into recovery.  Patient was experiencing 4 out of 10 baseline chest discomfort that did not change with exercise.  I met with the patient after she had a stress test done, and offered transfer to ED which she declined.  Instead, we obtained labs including a troponin, D-dimer, ESR, CRP, which were normal.  Also obtained a resting echocardiogram that demonstrated normal biventricular function, normal regional wall motion, mild aortic sclerosis with trace aortic insufficiency, trivial pericardial effusion.    Since then, patient reports that she has been feeling generally well, currently asymptomatic, however still with occasional chest heaviness.    No family history of early ASCVD.  She does not smoke cigarettes, did smoke for a few years approximately 40 years ago, does not drink alcohol, no recreational drug use.  ECG today in clinic demonstrates normal sinus rhythm, no acute ischemic changes.    Last lipids 12/21/2021 notable for total cholesterol 217, HDL 65, , triglycerides 129.    Assessment and Plan/Recommendations:    In summary, patient is a pleasant 62-year-old female with a past medical history significant for dyslipidemia, obesity, sleep apnea (on CPAP), ulcerative colitis, who presents for further evaluation and management of chest discomfort and an abnormal stress test.      Symptoms are not classically characteristic of angina, however she does have risk factors including age, dyslipidemia, ulcerative colitis also increases risk of ASCVD.  Exercise ECG treadmill stress test was abnormal, though specificity is not as high as an imaging stress test.  Given overall clinical presentation, recommended coronary CTA or cardiac catheterization.  Discussed risk/benefits, advantages/limitations of each strategy at length.  Patient would like definitive assessment as soon as possible.      After an  in-depth discussion, we will proceed with cardiac catheterization.  Discussed risks including but not limited to bleeding complications, stroke, heart attack, death, patient voiced understanding.  She denies any contrast allergies.  Will increase rosuvastatin to 10 mg nightly, and start aspirin 81 mg daily.  Advised patient to seek medical care if she develops any severe chest pain, chest pressure, abnormal shortness of breath, or other symptoms concerning for her in the interim.    Thank you for allowing our team to participate in the care of Joslyn LAM Otisольга.  Please do not hesitate to call or page me with any questions or concerns.    Sincerely,     Teofilo Arriola MD, [facility]  Cardiology  January 10, 2023    Total time spent on this encounter: 50 minutes, providing care in this encounter including, but not limited to, reviewing prior medical records, laboratory data, imaging studies, diagnostic studies, procedure notes, formulating an assessment and plan, recommendations, discussion and counseling with patient face to face, dictation.    Past Medical History:     The 10-year ASCVD risk score (Dwayne DK, et al., 2019) is: 4%    Values used to calculate the score:      Age: 62 years      Sex: Female      Is Non- : No      Diabetic: No      Tobacco smoker: No      Systolic Blood Pressure: 116 mmHg      Is BP treated: No      HDL Cholesterol: 65 mg/dL      Total Cholesterol: 217 mg/dL  Patient Active Problem List   Diagnosis     Ulcerative colitis (H)     Other health problem within the family     HYPERLIPIDEMIA LDL GOAL <130     Overweight (BMI 25.0-29.9)     Anxiety     Advanced directives, counseling/discussion     MIGUEL on CPAP     Other migraine without status migrainosus, not intractable     Perimenopause     S/P hysterectomy     Mild depression       Past Surgical History:   Past Surgical History:   Procedure Laterality Date     APPENDECTOMY       CYSTOSCOPY N/A 2/18/2014     Procedure: CYSTOSCOPY;  Surgeon: Rama Burton MD;  Location: [facility] OR     KNEE SURGERY  4/2012    left knee, meniscus     laparascopy  x 2 in age 30s    Infertiility     LAPAROSCOPIC HYSTERECTOMY TOTAL, BILATERAL SALPINGO-OOPHORECTOMY, COMBINED Bilateral 2/18/2014    Procedure: COMBINED LAPAROSCOPIC HYSTERECTOMY TOTAL, SALPINGO-OOPHORECTOMY;  Surgeon: Rama Burton MD;  Location: [facility] OR       Medications (outpatient):  Current Outpatient Medications   Medication Sig Dispense Refill     citalopram (CELEXA) 20 MG tablet Take 1.5 tablets (30 mg) by mouth daily 45 tablet 11     ondansetron (ZOFRAN ODT) 4 MG ODT tab Take 1-2 tablets (4-8 mg) by mouth every 8 hours as needed for nausea 30 tablet 3     oxyCODONE (ROXICODONE) 5 MG tablet Take 1-2 tablets (5-10 mg) by mouth every 6 hours as needed (intractable migraine) 45 tablet 0     rizatriptan (MAXALT) 10 MG tablet TAKE ONE TABLET BY MOUTH AT ONSET OF MIGRAINE MAY REPEAT DOSE IN 2 HOURS MAX 30MG IN 24 HOURS 18 tablet 11     rosuvastatin (CRESTOR) 5 MG tablet Take 1 tablet (5 mg) by mouth daily 90 tablet 3     Multiple Vitamin (DAILY MULTIVITAMIN PO) Take 1 tablet by mouth daily (Patient not taking: Reported on 12/28/2022)         Allergies:  Allergies   Allergen Reactions     No Known Drug Allergies        Social History:   History   Drug Use No      History   Smoking Status     Former     Types: Cigarettes   Smokeless Tobacco     Never     Social History    Substance and Sexual Activity      Alcohol use: Yes        Alcohol/week: 0.0 standard drinks        Comment: socially        Family History:  Family History   Problem Relation Age of Onset     Lipids Mother      Diabetes Maternal Grandmother      Diabetes Paternal Grandmother      Medical History Unknown Father        Review of Systems:   A comprehensive 12 system review of systems was carried out.  Pertinent positives and negatives are noted above. Otherwise negative for contributory  "information.    Objective & Physical Exam:  /62 (BP Location: Right arm, Patient Position: Sitting, Cuff Size: Adult Regular)   Pulse 62   Ht 1.753 m (5' 9\")   Wt 84.1 kg (185 lb 8 oz)   LMP 02/09/2014   SpO2 98%   BMI 27.39 kg/m    Wt Readings from Last 2 Encounters:   01/10/23 84.1 kg (185 lb 8 oz)   12/21/21 84.8 kg (187 lb)     Body mass index is 27.39 kg/m .   Body surface area is 2.02 meters squared.    Constitutional: appears stated age, in no apparent distress, appears to be well nourished  Head: normocephalic, atraumatic  Neck: supple, trachea midline  Pulmonary: clear to auscultation bilaterally, no wheezes, no rales, no increased work of breathing  Cardiovascular: JVP normal, regular rate, regular rhythm, normal S1 and S2, no S3, S4, no murmur appreciated, no lower extremity edema  Gastrointestinal: no guarding, non-rigid   Neurologic: awake, alert, moves all extremities  Skin: no jaundice, warm on limited exam  Psychiatric: affect is normal, answers questions appropriately, oriented to self and place    Data reviewed:  Lab Results   Component Value Date    WBC 6.1 12/28/2022    WBC 5.4 12/18/2020    RBC 5.03 12/28/2022    RBC 5.19 12/18/2020    HGB 13.6 12/28/2022    HGB 14.3 12/18/2020    HCT 40.3 12/28/2022    HCT 41.7 12/18/2020    MCV 80 12/28/2022    MCV 80 12/18/2020    MCH 27.0 12/28/2022    MCH 27.6 12/18/2020    MCHC 33.7 12/28/2022    MCHC 34.3 12/18/2020    RDW 12.6 12/28/2022    RDW 13.0 12/18/2020     12/28/2022     12/18/2020     Sodium   Date Value Ref Range Status   01/05/2023 141 136 - 145 mmol/L Final   07/01/2021 141 133 - 144 mmol/L Final     Potassium   Date Value Ref Range Status   01/05/2023 4.5 3.4 - 5.3 mmol/L Final   12/21/2021 4.0 3.4 - 5.3 mmol/L Final   07/01/2021 4.2 3.4 - 5.3 mmol/L Final     Chloride   Date Value Ref Range Status   01/05/2023 104 98 - 107 mmol/L Final   12/21/2021 108 94 - 109 mmol/L Final   07/01/2021 109 94 - 109 mmol/L Final "     Carbon Dioxide   Date Value Ref Range Status   07/01/2021 31 20 - 32 mmol/L Final     Carbon Dioxide (CO2)   Date Value Ref Range Status   01/05/2023 27 22 - 29 mmol/L Final   12/21/2021 25 20 - 32 mmol/L Final     Anion Gap   Date Value Ref Range Status   01/05/2023 10 7 - 15 mmol/L Final   12/21/2021 7 3 - 14 mmol/L Final   07/01/2021 1 (L) 3 - 14 mmol/L Final     Glucose   Date Value Ref Range Status   01/05/2023 102 (H) 70 - 99 mg/dL Final   12/21/2021 98 70 - 99 mg/dL Final   07/01/2021 93 70 - 99 mg/dL Final     Comment:     Fasting specimen     Urea Nitrogen   Date Value Ref Range Status   01/05/2023 23.9 (H) 8.0 - 23.0 mg/dL Final   12/21/2021 13 7 - 30 mg/dL Final   07/01/2021 24 7 - 30 mg/dL Final     Creatinine   Date Value Ref Range Status   01/05/2023 0.97 (H) 0.51 - 0.95 mg/dL Final   07/01/2021 0.92 0.52 - 1.04 mg/dL Final     GFR Estimate   Date Value Ref Range Status   01/05/2023 65 >60 mL/min/1.73m2 Final     Comment:     Effective December 21, 2021 eGFRcr in adults is calculated using the 2021 CKD-EPI creatinine equation which includes age and gender (Polina cruz al., NEJ, DOI: 10.1056/EZNJrb6274094)   07/01/2021 66 >60 mL/min/[1.73_m2] Final     Comment:     Non  GFR Calc  Starting 12/18/2018, serum creatinine based estimated GFR (eGFR) will be   calculated using the Chronic Kidney Disease Epidemiology Collaboration   (CKD-EPI) equation.       Calcium   Date Value Ref Range Status   01/05/2023 9.9 8.8 - 10.2 mg/dL Final   07/01/2021 9.4 8.5 - 10.1 mg/dL Final     Bilirubin Total   Date Value Ref Range Status   12/28/2022 0.9 <=1.2 mg/dL Final   07/01/2021 1.3 0.2 - 1.3 mg/dL Final     Alkaline Phosphatase   Date Value Ref Range Status   12/28/2022 82 35 - 104 U/L Final   07/01/2021 84 40 - 150 U/L Final     ALT   Date Value Ref Range Status   12/28/2022 22 10 - 35 U/L Final   07/01/2021 25 0 - 50 U/L Final     AST   Date Value Ref Range Status   12/28/2022 27 10 - 35 U/L Final    2021 18 0 - 45 U/L Final     Recent Labs   Lab Test 21  1431 21  0850   CHOL 217* 185   HDL 65 64   * 108*   TRIG 129 63      Lab Results   Component Value Date    A1C 5.3 2021        Recent Results (from the past 4320 hour(s))   Echocardiogram Complete   Result Value    LVEF  60-65%    Narrative    [id number]  [id number]  [id number]  [id number]^NOE^ETHEL^FLY     [facility]  Echocardiography Laboratory  [address] [place] [address]     Name: ADAMA CHEW  MRN: [id number]  : 1960  Study Date: 2023 10:08 AM  Age: 62 yrs  Gender: Female  Patient Location: [facility]  Reason For Study: Chest pain  Ordering Physician: ETHEL LIU  Referring Physician: ORLANDO Bill  Performed By: Antonia Tapia RDCS     BSA: 2.0 m2  Height: 69 in  Weight: 187 lb  HR: 73  BP: 110/60 mmHg  ______________________________________________________________________________  Procedure  Complete Echo Adult.  ______________________________________________________________________________  Interpretation Summary     Left ventricular systolic function is normal.The visual ejection fraction is  60-65%.  The right ventricular systolic function is normal.  There is trace aortic regurgitation.  Trivial pericardial effusion  The inferior vena cava was normal in size with preserved respiratory  variability.  ______________________________________________________________________________  Left Ventricle  The left ventricle is normal in size. There is normal left ventricular wall  thickness. Diastolic Doppler findings (E/E' ratio and/or other parameters)  suggest left ventricular filling pressures are indeterminate. Left ventricular  systolic function is normal. The visual ejection fraction is 60-65%. No  regional wall motion abnormalities noted.     Right Ventricle  The right ventricle is normal size. The right ventricular systolic function is  normal.     Atria  Normal left atrial size.  Right atrial size is normal.     Mitral Valve  There is mild (1+) mitral regurgitation.     Tricuspid Valve  There is trace tricuspid regurgitation. The right ventricular systolic  pressure is approximated at 19.0 mmHg plus the right atrial pressure.     Aortic Valve  The aortic valve is not well visualized. Mild aortic valve sclerosis noted.  There is trace aortic regurgitation. No aortic stenosis is present.     Pulmonic Valve  There is trace pulmonic valvular regurgitation. There is no pulmonic valvular  stenosis.     Vessels  The aortic root is normal size. Normal size ascending aorta. The inferior vena  cava was normal in size with preserved respiratory variability.     Pericardium  Trivial pericardial effusion.     Rhythm  Sinus rhythm was noted.  ______________________________________________________________________________  MMode/2D Measurements & Calculations  IVSd: 0.79 cm     LVIDd: 5.1 cm  LVIDs: 3.9 cm  LVPWd: 0.85 cm  FS: 22.2 %  LV mass(C)d: 143.4 grams  LV mass(C)dI: 71.4 grams/m2  Ao root diam: 2.8 cm  LA dimension: 3.1 cm  LA/Ao: 1.1  LA Volume (BP): 47.5 ml  LA Volume Index (BP): 23.6 ml/m2  RWT: 0.34     Doppler Measurements & Calculations  MV E max flaco: 59.6 cm/sec  MV A max flaco: 67.3 cm/sec  MV E/A: 0.89  MV dec time: 0.25 sec  AI P1/2t: 729.3 msec  PA acc time: 0.12 sec  TR max flaco: 217.5 cm/sec  TR max P.0 mmHg  E/E' avg: 10.7  Lateral E/e': 12.6  Medial E/e': 8.7     ______________________________________________________________________________  Report approved by: Lakesha Hebert 2023 09:56 AM                  Thank you for allowing me to participate in the care of your patient.      Sincerely,     Teofilo Arriola MD     [facility]  cc:   No referring provider defined for this encounter.

## 2023-01-10 NOTE — PATIENT INSTRUCTIONS
January 10, 2023    Thank you for allowing our Cardiology team to participate in your care.     Please note the following changes to your heart treatment plan:     Medication changes:   - start aspirin 81mg daily (with a glass of water and food)  - increase rosuvastatin to 10mg at bedtime     Tests to be done:  - cardiac catheterization and coronary angiogram and possible intervention     Follow up:  - Follow up in about 1 month with cardiology LISA, or sooner as needed.      For scheduling, please call [phone].    Please contact our team at [phone] (Maribel WARE) or [phone] for any questions or concerns.     If you are having a medical emergency, please call [phone].     Sincerely,    Teofilo Arriola MD, [facility]  Cardiology    [facility]

## 2023-01-10 NOTE — PROGRESS NOTES
Cardiology Clinic Consultation:    January 10, 2023   Patient Name: Joslyn Sanchez  Patient MRN: [id number]    Consult indication: abnormal stress test    HPI:    I had the opportunity to see patient Joslyn Sanchez in cardiology clinic for a consultation. Patient is followed by our colleagues at [facility] with Primary Care.     As you know, patient is a pleasant 62-year-old female with a past medical history significant for dyslipidemia, obesity, sleep apnea (on CPAP), ulcerative colitis, who presents for further evaluation and management of chest discomfort and an abnormal stress test.    Patient reports that approximately 1 year ago at [facility] time, she was experiencing intermittent chest pressure/heaviness.  She was assessed with labs including a troponin that was normal, D-dimer that was normal, COVID-19 test was negative.  CBC was unremarkable, CMP notable for borderline elevated bilirubin, AST, ALT.  Follow-up CMP 12/21/2021 demonstrated normal liver enzymes.    Since then, she reports that she has been doing well.  Her son got  in the summer, and so she started exercising more in an effort to lose weight, cycling for 15 miles at a time without issue.  However she had a knee injury and so stopped exercising in the fall.    Over the holidays, she started developing recurrent intermittent chest heaviness/tightness.  The symptoms persisted for several days, waxing and waning in severity.  Symptoms were not necessarily exertional in nature, and sometimes worse with movement of her torso/shoulders.  For further evaluation, patient was assessed with an exercise ECG treadmill stress test 1/5/2023 that was abnormal, with 1 mm horizontal ST segment depression in leads II, III, aVF, and V5 through V6, return to baseline at 4 minutes into recovery.  Patient was experiencing 4 out of 10 baseline chest discomfort that did not change with exercise.  I met with the  patient after she had a stress test done, and offered transfer to ED which she declined.  Instead, we obtained labs including a troponin, D-dimer, ESR, CRP, which were normal.  Also obtained a resting echocardiogram that demonstrated normal biventricular function, normal regional wall motion, mild aortic sclerosis with trace aortic insufficiency, trivial pericardial effusion.    Since then, patient reports that she has been feeling generally well, currently asymptomatic, however still with occasional chest heaviness.    No family history of early ASCVD.  She does not smoke cigarettes, did smoke for a few years approximately 40 years ago, does not drink alcohol, no recreational drug use.  ECG today in clinic demonstrates normal sinus rhythm, no acute ischemic changes.    Last lipids 12/21/2021 notable for total cholesterol 217, HDL 65, , triglycerides 129.    Assessment and Plan/Recommendations:    In summary, patient is a pleasant 62-year-old female with a past medical history significant for dyslipidemia, obesity, sleep apnea (on CPAP), ulcerative colitis, who presents for further evaluation and management of chest discomfort and an abnormal stress test.      Symptoms are not classically characteristic of angina, however she does have risk factors including age, dyslipidemia, ulcerative colitis also increases risk of ASCVD.  Exercise ECG treadmill stress test was abnormal, though specificity is not as high as an imaging stress test.  Given overall clinical presentation, recommended coronary CTA or cardiac catheterization.  Discussed risk/benefits, advantages/limitations of each strategy at length.  Patient would like definitive assessment as soon as possible.      After an in-depth discussion, we will proceed with cardiac catheterization.  Discussed risks including but not limited to bleeding complications, stroke, heart attack, death, patient voiced understanding.  She denies any contrast allergies.  Will  increase rosuvastatin to 10 mg nightly, and start aspirin 81 mg daily.  Advised patient to seek medical care if she develops any severe chest pain, chest pressure, abnormal shortness of breath, or other symptoms concerning for her in the interim.    Thank you for allowing our team to participate in the care of Joslyn Sanchez.  Please do not hesitate to call or page me with any questions or concerns.    Sincerely,     Teofilo Arriola MD, [facility]  Cardiology  January 10, 2023    Total time spent on this encounter: 50 minutes, providing care in this encounter including, but not limited to, reviewing prior medical records, laboratory data, imaging studies, diagnostic studies, procedure notes, formulating an assessment and plan, recommendations, discussion and counseling with patient face to face, dictation.    Past Medical History:     The 10-year ASCVD risk score (Dwayne BROWN, et al., 2019) is: 4%    Values used to calculate the score:      Age: 62 years      Sex: Female      Is Non- : No      Diabetic: No      Tobacco smoker: No      Systolic Blood Pressure: 116 mmHg      Is BP treated: No      HDL Cholesterol: 65 mg/dL      Total Cholesterol: 217 mg/dL  Patient Active Problem List   Diagnosis     Ulcerative colitis (H)     Other health problem within the family     HYPERLIPIDEMIA LDL GOAL <130     Overweight (BMI 25.0-29.9)     Anxiety     Advanced directives, counseling/discussion     MIGUEL on CPAP     Other migraine without status migrainosus, not intractable     Perimenopause     S/P hysterectomy     Mild depression       Past Surgical History:   Past Surgical History:   Procedure Laterality Date     APPENDECTOMY       CYSTOSCOPY N/A 2/18/2014    Procedure: CYSTOSCOPY;  Surgeon: Rama Burton MD;  Location: [facility] OR     KNEE SURGERY  4/2012    left knee, meniscus     laparascopy  x 2 in age 30s    Infertiility     LAPAROSCOPIC HYSTERECTOMY TOTAL, BILATERAL  "SALPINGO-OOPHORECTOMY, COMBINED Bilateral 2/18/2014    Procedure: COMBINED LAPAROSCOPIC HYSTERECTOMY TOTAL, SALPINGO-OOPHORECTOMY;  Surgeon: Rama Burton MD;  Location:  OR       Medications (outpatient):  Current Outpatient Medications   Medication Sig Dispense Refill     citalopram (CELEXA) 20 MG tablet Take 1.5 tablets (30 mg) by mouth daily 45 tablet 11     ondansetron (ZOFRAN ODT) 4 MG ODT tab Take 1-2 tablets (4-8 mg) by mouth every 8 hours as needed for nausea 30 tablet 3     oxyCODONE (ROXICODONE) 5 MG tablet Take 1-2 tablets (5-10 mg) by mouth every 6 hours as needed (intractable migraine) 45 tablet 0     rizatriptan (MAXALT) 10 MG tablet TAKE ONE TABLET BY MOUTH AT ONSET OF MIGRAINE MAY REPEAT DOSE IN 2 HOURS MAX 30MG IN 24 HOURS 18 tablet 11     rosuvastatin (CRESTOR) 5 MG tablet Take 1 tablet (5 mg) by mouth daily 90 tablet 3     Multiple Vitamin (DAILY MULTIVITAMIN PO) Take 1 tablet by mouth daily (Patient not taking: Reported on 12/28/2022)         Allergies:  Allergies   Allergen Reactions     No Known Drug Allergies        Social History:   History   Drug Use No      History   Smoking Status     Former     Types: Cigarettes   Smokeless Tobacco     Never     Social History    Substance and Sexual Activity      Alcohol use: Yes        Alcohol/week: 0.0 standard drinks        Comment: socially        Family History:  Family History   Problem Relation Age of Onset     Lipids Mother      Diabetes Maternal Grandmother      Diabetes Paternal Grandmother      Medical History Unknown Father        Review of Systems:   A comprehensive 12 system review of systems was carried out.  Pertinent positives and negatives are noted above. Otherwise negative for contributory information.    Objective & Physical Exam:  /62 (BP Location: Right arm, Patient Position: Sitting, Cuff Size: Adult Regular)   Pulse 62   Ht 1.753 m (5' 9\")   Wt 84.1 kg (185 lb 8 oz)   LMP 02/09/2014   SpO2 98%   BMI 27.39 " kg/m    Wt Readings from Last 2 Encounters:   01/10/23 84.1 kg (185 lb 8 oz)   12/21/21 84.8 kg (187 lb)     Body mass index is 27.39 kg/m .   Body surface area is 2.02 meters squared.    Constitutional: appears stated age, in no apparent distress, appears to be well nourished  Head: normocephalic, atraumatic  Neck: supple, trachea midline  Pulmonary: clear to auscultation bilaterally, no wheezes, no rales, no increased work of breathing  Cardiovascular: JVP normal, regular rate, regular rhythm, normal S1 and S2, no S3, S4, no murmur appreciated, no lower extremity edema  Gastrointestinal: no guarding, non-rigid   Neurologic: awake, alert, moves all extremities  Skin: no jaundice, warm on limited exam  Psychiatric: affect is normal, answers questions appropriately, oriented to self and place    Data reviewed:  Lab Results   Component Value Date    WBC 6.1 12/28/2022    WBC 5.4 12/18/2020    RBC 5.03 12/28/2022    RBC 5.19 12/18/2020    HGB 13.6 12/28/2022    HGB 14.3 12/18/2020    HCT 40.3 12/28/2022    HCT 41.7 12/18/2020    MCV 80 12/28/2022    MCV 80 12/18/2020    MCH 27.0 12/28/2022    MCH 27.6 12/18/2020    MCHC 33.7 12/28/2022    MCHC 34.3 12/18/2020    RDW 12.6 12/28/2022    RDW 13.0 12/18/2020     12/28/2022     12/18/2020     Sodium   Date Value Ref Range Status   01/05/2023 141 136 - 145 mmol/L Final   07/01/2021 141 133 - 144 mmol/L Final     Potassium   Date Value Ref Range Status   01/05/2023 4.5 3.4 - 5.3 mmol/L Final   12/21/2021 4.0 3.4 - 5.3 mmol/L Final   07/01/2021 4.2 3.4 - 5.3 mmol/L Final     Chloride   Date Value Ref Range Status   01/05/2023 104 98 - 107 mmol/L Final   12/21/2021 108 94 - 109 mmol/L Final   07/01/2021 109 94 - 109 mmol/L Final     Carbon Dioxide   Date Value Ref Range Status   07/01/2021 31 20 - 32 mmol/L Final     Carbon Dioxide (CO2)   Date Value Ref Range Status   01/05/2023 27 22 - 29 mmol/L Final   12/21/2021 25 20 - 32 mmol/L Final     Anion Gap   Date  Value Ref Range Status   01/05/2023 10 7 - 15 mmol/L Final   12/21/2021 7 3 - 14 mmol/L Final   07/01/2021 1 (L) 3 - 14 mmol/L Final     Glucose   Date Value Ref Range Status   01/05/2023 102 (H) 70 - 99 mg/dL Final   12/21/2021 98 70 - 99 mg/dL Final   07/01/2021 93 70 - 99 mg/dL Final     Comment:     Fasting specimen     Urea Nitrogen   Date Value Ref Range Status   01/05/2023 23.9 (H) 8.0 - 23.0 mg/dL Final   12/21/2021 13 7 - 30 mg/dL Final   07/01/2021 24 7 - 30 mg/dL Final     Creatinine   Date Value Ref Range Status   01/05/2023 0.97 (H) 0.51 - 0.95 mg/dL Final   07/01/2021 0.92 0.52 - 1.04 mg/dL Final     GFR Estimate   Date Value Ref Range Status   01/05/2023 65 >60 mL/min/1.73m2 Final     Comment:     Effective December 21, 2021 eGFRcr in adults is calculated using the 2021 CKD-EPI creatinine equation which includes age and gender (Polina et al., NEJ, DOI: 10.1056/ZVJWlr0771750)   07/01/2021 66 >60 mL/min/[1.73_m2] Final     Comment:     Non  GFR Calc  Starting 12/18/2018, serum creatinine based estimated GFR (eGFR) will be   calculated using the Chronic Kidney Disease Epidemiology Collaboration   (CKD-EPI) equation.       Calcium   Date Value Ref Range Status   01/05/2023 9.9 8.8 - 10.2 mg/dL Final   07/01/2021 9.4 8.5 - 10.1 mg/dL Final     Bilirubin Total   Date Value Ref Range Status   12/28/2022 0.9 <=1.2 mg/dL Final   07/01/2021 1.3 0.2 - 1.3 mg/dL Final     Alkaline Phosphatase   Date Value Ref Range Status   12/28/2022 82 35 - 104 U/L Final   07/01/2021 84 40 - 150 U/L Final     ALT   Date Value Ref Range Status   12/28/2022 22 10 - 35 U/L Final   07/01/2021 25 0 - 50 U/L Final     AST   Date Value Ref Range Status   12/28/2022 27 10 - 35 U/L Final   07/01/2021 18 0 - 45 U/L Final     Recent Labs   Lab Test 12/21/21  1431 07/01/21  0850   CHOL 217* 185   HDL 65 64   * 108*   TRIG 129 63      Lab Results   Component Value Date    A1C 5.3 07/01/2021        Recent Results (from  the past 4320 hour(s))   Echocardiogram Complete   Result Value    LVEF  60-65%    [facility]    [id number]  [id number]  [id number]  [id number]^NOE^ETHEL^FLY     [facility]  Echocardiography Laboratory  [address] [place] [address]     Name: ADAMA CHEW  MRN: [id number]  : 1960  Study Date: 2023 10:08 AM  Age: 62 yrs  Gender: Female  Patient Location: [facility]  Reason For Study: Chest pain  Ordering Physician: ETHEL LIU  Referring Physician: ORLANDO Bill  Performed By: Antonia Tapia RDCS     BSA: 2.0 m2  Height: 69 in  Weight: 187 lb  HR: 73  BP: 110/60 mmHg  ______________________________________________________________________________  Procedure  Complete Echo Adult.  ______________________________________________________________________________  Interpretation Summary     Left ventricular systolic function is normal.The visual ejection fraction is  60-65%.  The right ventricular systolic function is normal.  There is trace aortic regurgitation.  Trivial pericardial effusion  The inferior vena cava was normal in size with preserved respiratory  variability.  ______________________________________________________________________________  Left Ventricle  The left ventricle is normal in size. There is normal left ventricular wall  thickness. Diastolic Doppler findings (E/E' ratio and/or other parameters)  suggest left ventricular filling pressures are indeterminate. Left ventricular  systolic function is normal. The visual ejection fraction is 60-65%. No  regional wall motion abnormalities noted.     Right Ventricle  The right ventricle is normal size. The right ventricular systolic function is  normal.     Atria  Normal left atrial size. Right atrial size is normal.     Mitral Valve  There is mild (1+) mitral regurgitation.     Tricuspid Valve  There is trace tricuspid regurgitation. The right ventricular systolic  pressure is approximated at 19.0 mmHg plus the right atrial  pressure.     Aortic Valve  The aortic valve is not well visualized. Mild aortic valve sclerosis noted.  There is trace aortic regurgitation. No aortic stenosis is present.     Pulmonic Valve  There is trace pulmonic valvular regurgitation. There is no pulmonic valvular  stenosis.     Vessels  The aortic root is normal size. Normal size ascending aorta. The inferior vena  cava was normal in size with preserved respiratory variability.     Pericardium  Trivial pericardial effusion.     Rhythm  Sinus rhythm was noted.  ______________________________________________________________________________  MMode/2D Measurements & Calculations  IVSd: 0.79 cm     LVIDd: 5.1 cm  LVIDs: 3.9 cm  LVPWd: 0.85 cm  FS: 22.2 %  LV mass(C)d: 143.4 grams  LV mass(C)dI: 71.4 grams/m2  Ao root diam: 2.8 cm  LA dimension: 3.1 cm  LA/Ao: 1.1  LA Volume (BP): 47.5 ml  LA Volume Index (BP): 23.6 ml/m2  RWT: 0.34     Doppler Measurements & Calculations  MV E max flaco: 59.6 cm/sec  MV A max flaco: 67.3 cm/sec  MV E/A: 0.89  MV dec time: 0.25 sec  AI P1/2t: 729.3 msec  PA acc time: 0.12 sec  TR max flaco: 217.5 cm/sec  TR max P.0 mmHg  E/E' avg: 10.7  Lateral E/e': 12.6  Medial E/e': 8.7     ______________________________________________________________________________  Report approved by: Lakesha Hebert 2023 09:56 AM

## 2023-01-11 ENCOUNTER — TELEPHONE (OUTPATIENT)
Dept: CARDIOLOGY | Facility: CLINIC | Age: 63
End: 2023-01-11

## 2023-01-11 NOTE — TELEPHONE ENCOUNTER
[facility] Call Center    Phone Message    May a detailed message be left on voicemail: yes     Reason for Call: Other: Please call pt back to reschedule her procedure on the 18th. Thank you     Action Taken: Message routed to:  Other: Cardiology    Travel Screening: Not Applicable       Thank you!  [facility]

## 2023-01-16 ENCOUNTER — TELEPHONE (OUTPATIENT)
Dept: CARDIOLOGY | Facility: CLINIC | Age: 63
End: 2023-01-16

## 2023-01-16 DIAGNOSIS — R07.89 LEFT CHEST PRESSURE: ICD-10-CM

## 2023-01-16 DIAGNOSIS — R94.39 ABNORMAL STRESS TEST: Primary | ICD-10-CM

## 2023-01-16 RX ORDER — SODIUM CHLORIDE 9 MG/ML
INJECTION, SOLUTION INTRAVENOUS CONTINUOUS
Status: CANCELLED | OUTPATIENT
Start: 2023-01-16

## 2023-01-16 RX ORDER — LIDOCAINE 40 MG/G
CREAM TOPICAL
Status: CANCELLED | OUTPATIENT
Start: 2023-01-16

## 2023-01-16 RX ORDER — ASPIRIN 81 MG/1
243 TABLET, CHEWABLE ORAL ONCE
Status: CANCELLED | OUTPATIENT
Start: 2023-01-16

## 2023-01-16 RX ORDER — ASPIRIN 325 MG
325 TABLET ORAL ONCE
Status: CANCELLED | OUTPATIENT
Start: 2023-01-16 | End: 2023-01-16

## 2023-01-16 RX ORDER — POTASSIUM CHLORIDE 1500 MG/1
20 TABLET, EXTENDED RELEASE ORAL
Status: CANCELLED | OUTPATIENT
Start: 2023-01-16

## 2023-01-16 NOTE — TELEPHONE ENCOUNTER
----- Message from Su Neff sent at 1/10/2023  2:17 PM CST -----  Regarding: [facility]  Angiogram Orders    Location: [facility] - [address] [place] [address] - Main Entrance of the Hospital    Procedure: Left Heart Catheterization    Diagnosis: Abnormal Stress test, Left Chest Pressure    Procedure Date: 1/18/23    Patient Arrival Time: 10 am    Procedure Time: 1200 (pending emergency) Dent    Ordering Cardiologist: Dr. Arriola    Performing Cardiologist: Dr. Silveira    Cardiac Assessment Completed: Yes  Date: 1/10/23  Provider: Dr Arriola    Pre-Procedure Labs completed: on admit    Post Procedure LISA appointment scheduled: Yes  Date: 2/3/23  Provider: Liya Pardo    Patient Diabetic on Meds/Insulin:  No  If on Metformin, has 2 day post procedure BMP been scheduled: No  (Thursday and Friday procedures should have Monday BMP)  Patient on Coumadin/Warfarin:  No  Patient on Pradaxa/Xarelto/Eliquis:  No  Patient on Invokana/Farxiga/Jardiance/Steglatro:  No    Does Patient have a history of bypass:  No    If yes, when was it done:   If yes, where was it done:      Pt advised to report any COVID like symptoms to care team prior to procedure.    Appointment was scheduled: Face to Face    Special instructions:

## 2023-01-16 NOTE — TELEPHONE ENCOUNTER
Coronary angiogram/PCI/Right Heart Cath prep instructions.     Patient is scheduled for a Coronary Angiogram at [facility] - [address] [place] [address] - Main Entrance of the Hospital, on 1/18/2023.  Check in time is at 10:00AM and procedure to follow.    Patient instructed to remain NPO for solid foods 8 hours prior to arrival and may have clear liquids up to 2 hours prior to arrival.    Patient does not require extra fluids prior to procedure.    Patient is not diabetic.    Patient is not on anticoagulation.    Pt is not on diuretics    Patient is taking ASA 81mg daily and will take 4 tabs (324mg) the morning of the procedure.    Pt is not on a SGLT2 inhibitor.    Patient advised to take their other daily medications the morning of the procedure with small sips of water.     Verified patient does not have a contrast allergy.    Verified patient has someone available to drive them home from the hospital and can stay with them for 24 hours after the procedure.     Patient advised to notify care team with any new COVID like symptoms prior to procedure.    Patient will check their temperature the morning of procedure and call [facility] at [phone] if temp is >100.0.    Patient is aware of visitor policy.    Patient expresses understanding of above instructions and denies further questions at this time.      SANFORD Clark RN, BSN. 01/16/23 1:46 PM   [facility]

## 2023-01-18 ENCOUNTER — RESULTS ONLY (OUTPATIENT)
Dept: CARDIOLOGY | Facility: CLINIC | Age: 63
End: 2023-01-18

## 2023-01-18 ENCOUNTER — HOSPITAL ENCOUNTER (OUTPATIENT)
Facility: CLINIC | Age: 63
Discharge: HOME | End: 2023-01-18
Admitting: INTERNAL MEDICINE
Payer: COMMERCIAL

## 2023-01-18 VITALS
HEART RATE: 52 BPM | TEMPERATURE: 96.6 F | OXYGEN SATURATION: 97 % | DIASTOLIC BLOOD PRESSURE: 72 MMHG | SYSTOLIC BLOOD PRESSURE: 113 MMHG | RESPIRATION RATE: 15 BRPM

## 2023-01-18 DIAGNOSIS — R94.39 ABNORMAL STRESS TEST: ICD-10-CM

## 2023-01-18 DIAGNOSIS — R07.89 LEFT CHEST PRESSURE: ICD-10-CM

## 2023-01-18 PROBLEM — Z98.890 STATUS POST CORONARY ANGIOGRAM: Status: ACTIVE | Noted: 2023-01-18

## 2023-01-18 LAB
ANION GAP SERPL CALCULATED.3IONS-SCNC: 10 MMOL/L (ref 7–15)
ATRIAL RATE - MUSE: 59 BPM
BUN SERPL-MCNC: 15.5 MG/DL (ref 8–23)
CALCIUM SERPL-MCNC: 9.7 MG/DL (ref 8.8–10.2)
CHLORIDE SERPL-SCNC: 100 MMOL/L (ref 98–107)
CREAT SERPL-MCNC: 0.8 MG/DL (ref 0.51–0.95)
DEPRECATED HCO3 PLAS-SCNC: 29 MMOL/L (ref 22–29)
DIASTOLIC BLOOD PRESSURE - MUSE: NORMAL MMHG
ERYTHROCYTE [DISTWIDTH] IN BLOOD BY AUTOMATED COUNT: 12.8 % (ref 10–15)
GFR SERPL CREATININE-BSD FRML MDRD: 82 ML/MIN/1.73M2
GLUCOSE SERPL-MCNC: 95 MG/DL (ref 70–99)
HCT VFR BLD AUTO: 41 % (ref 35–47)
HGB BLD-MCNC: 13.7 G/DL (ref 11.7–15.7)
INR PPP: 0.99 (ref 0.85–1.15)
INTERPRETATION ECG - MUSE: NORMAL
MCH RBC QN AUTO: 27 PG (ref 26.5–33)
MCHC RBC AUTO-ENTMCNC: 33.4 G/DL (ref 31.5–36.5)
MCV RBC AUTO: 81 FL (ref 78–100)
P AXIS - MUSE: 51 DEGREES
PLATELET # BLD AUTO: 202 10E3/UL (ref 150–450)
POTASSIUM SERPL-SCNC: 4.2 MMOL/L (ref 3.4–5.3)
PR INTERVAL - MUSE: 164 MS
QRS DURATION - MUSE: 102 MS
QT - MUSE: 454 MS
QTC - MUSE: 449 MS
R AXIS - MUSE: 59 DEGREES
RBC # BLD AUTO: 5.07 10E6/UL (ref 3.8–5.2)
SODIUM SERPL-SCNC: 139 MMOL/L (ref 136–145)
SYSTOLIC BLOOD PRESSURE - MUSE: NORMAL MMHG
T AXIS - MUSE: 47 DEGREES
VENTRICULAR RATE- MUSE: 59 BPM
WBC # BLD AUTO: 5.1 10E3/UL (ref 4–11)

## 2023-01-18 PROCEDURE — C1769 GUIDE WIRE: HCPCS | Performed by: INTERNAL MEDICINE

## 2023-01-18 PROCEDURE — 85027 COMPLETE CBC AUTOMATED: CPT

## 2023-01-18 PROCEDURE — 36415 COLL VENOUS BLD VENIPUNCTURE: CPT

## 2023-01-18 PROCEDURE — 93010 ELECTROCARDIOGRAM REPORT: CPT | Performed by: INTERNAL MEDICINE

## 2023-01-18 PROCEDURE — 258N000003 HC RX IP 258 OP 636: Performed by: INTERNAL MEDICINE

## 2023-01-18 PROCEDURE — 99152 MOD SED SAME PHYS/QHP 5/>YRS: CPT | Performed by: INTERNAL MEDICINE

## 2023-01-18 PROCEDURE — 80048 BASIC METABOLIC PNL TOTAL CA: CPT

## 2023-01-18 PROCEDURE — 250N000011 HC RX IP 250 OP 636: Performed by: INTERNAL MEDICINE

## 2023-01-18 PROCEDURE — C1887 CATHETER, GUIDING: HCPCS | Performed by: INTERNAL MEDICINE

## 2023-01-18 PROCEDURE — 250N000009 HC RX 250: Performed by: INTERNAL MEDICINE

## 2023-01-18 PROCEDURE — 272N000001 HC OR GENERAL SUPPLY STERILE: Performed by: INTERNAL MEDICINE

## 2023-01-18 PROCEDURE — 93454 CORONARY ARTERY ANGIO S&I: CPT | Mod: 26 | Performed by: INTERNAL MEDICINE

## 2023-01-18 PROCEDURE — 85610 PROTHROMBIN TIME: CPT

## 2023-01-18 PROCEDURE — 93454 CORONARY ARTERY ANGIO S&I: CPT | Performed by: INTERNAL MEDICINE

## 2023-01-18 PROCEDURE — C1894 INTRO/SHEATH, NON-LASER: HCPCS | Performed by: INTERNAL MEDICINE

## 2023-01-18 RX ORDER — SODIUM CHLORIDE 9 MG/ML
INJECTION, SOLUTION INTRAVENOUS CONTINUOUS
Status: DISCONTINUED | OUTPATIENT
Start: 2023-01-18 | End: 2023-01-18 | Stop reason: HOSPADM

## 2023-01-18 RX ORDER — LIDOCAINE HYDROCHLORIDE 10 MG/ML
INJECTION, SOLUTION EPIDURAL; INFILTRATION; INTRACAUDAL; PERINEURAL
Status: DISCONTINUED
Start: 2023-01-18 | End: 2023-01-18 | Stop reason: HOSPADM

## 2023-01-18 RX ORDER — NALOXONE HYDROCHLORIDE 0.4 MG/ML
0.2 INJECTION, SOLUTION INTRAMUSCULAR; INTRAVENOUS; SUBCUTANEOUS
Status: DISCONTINUED | OUTPATIENT
Start: 2023-01-18 | End: 2023-01-18 | Stop reason: HOSPADM

## 2023-01-18 RX ORDER — NITROGLYCERIN 5 MG/ML
VIAL (ML) INTRAVENOUS
Status: DISCONTINUED
Start: 2023-01-18 | End: 2023-01-18 | Stop reason: HOSPADM

## 2023-01-18 RX ORDER — OXYCODONE HYDROCHLORIDE 5 MG/1
5 TABLET ORAL EVERY 4 HOURS PRN
Status: DISCONTINUED | OUTPATIENT
Start: 2023-01-18 | End: 2023-01-18 | Stop reason: HOSPADM

## 2023-01-18 RX ORDER — HEPARIN SODIUM 1000 [USP'U]/ML
INJECTION, SOLUTION INTRAVENOUS; SUBCUTANEOUS
Status: COMPLETED | OUTPATIENT
Start: 2023-01-18 | End: 2023-01-18

## 2023-01-18 RX ORDER — VERAPAMIL HYDROCHLORIDE 2.5 MG/ML
INJECTION, SOLUTION INTRAVENOUS
Status: DISCONTINUED
Start: 2023-01-18 | End: 2023-01-18 | Stop reason: HOSPADM

## 2023-01-18 RX ORDER — ACETAMINOPHEN 325 MG/1
650 TABLET ORAL EVERY 4 HOURS PRN
Status: DISCONTINUED | OUTPATIENT
Start: 2023-01-18 | End: 2023-01-18 | Stop reason: HOSPADM

## 2023-01-18 RX ORDER — FENTANYL CITRATE 50 UG/ML
25 INJECTION, SOLUTION INTRAMUSCULAR; INTRAVENOUS
Status: DISCONTINUED | OUTPATIENT
Start: 2023-01-18 | End: 2023-01-18 | Stop reason: HOSPADM

## 2023-01-18 RX ORDER — OXYCODONE HYDROCHLORIDE 10 MG/1
10 TABLET ORAL EVERY 4 HOURS PRN
Status: DISCONTINUED | OUTPATIENT
Start: 2023-01-18 | End: 2023-01-18 | Stop reason: HOSPADM

## 2023-01-18 RX ORDER — VERAPAMIL HYDROCHLORIDE 2.5 MG/ML
INJECTION, SOLUTION INTRAVENOUS
Status: COMPLETED | OUTPATIENT
Start: 2023-01-18 | End: 2023-01-18

## 2023-01-18 RX ORDER — NITROGLYCERIN 5 MG/ML
VIAL (ML) INTRAVENOUS
Status: COMPLETED | OUTPATIENT
Start: 2023-01-18 | End: 2023-01-18

## 2023-01-18 RX ORDER — NALOXONE HYDROCHLORIDE 0.4 MG/ML
0.4 INJECTION, SOLUTION INTRAMUSCULAR; INTRAVENOUS; SUBCUTANEOUS
Status: DISCONTINUED | OUTPATIENT
Start: 2023-01-18 | End: 2023-01-18 | Stop reason: HOSPADM

## 2023-01-18 RX ORDER — FENTANYL CITRATE 50 UG/ML
INJECTION, SOLUTION INTRAMUSCULAR; INTRAVENOUS
Status: DISCONTINUED
Start: 2023-01-18 | End: 2023-01-18 | Stop reason: HOSPADM

## 2023-01-18 RX ORDER — FENTANYL CITRATE 50 UG/ML
INJECTION, SOLUTION INTRAMUSCULAR; INTRAVENOUS
Status: COMPLETED | OUTPATIENT
Start: 2023-01-18 | End: 2023-01-18

## 2023-01-18 RX ORDER — HEPARIN SODIUM 1000 [USP'U]/ML
INJECTION, SOLUTION INTRAVENOUS; SUBCUTANEOUS
Status: DISCONTINUED
Start: 2023-01-18 | End: 2023-01-18 | Stop reason: HOSPADM

## 2023-01-18 RX ORDER — ASPIRIN 81 MG/1
243 TABLET, CHEWABLE ORAL ONCE
Status: COMPLETED | OUTPATIENT
Start: 2023-01-18 | End: 2023-01-18

## 2023-01-18 RX ORDER — ATROPINE SULFATE 0.1 MG/ML
0.5 INJECTION INTRAVENOUS
Status: DISCONTINUED | OUTPATIENT
Start: 2023-01-18 | End: 2023-01-18 | Stop reason: HOSPADM

## 2023-01-18 RX ORDER — IOPAMIDOL 755 MG/ML
INJECTION, SOLUTION INTRAVASCULAR
Status: COMPLETED | OUTPATIENT
Start: 2023-01-18 | End: 2023-01-18

## 2023-01-18 RX ORDER — LIDOCAINE 40 MG/G
CREAM TOPICAL
Status: DISCONTINUED | OUTPATIENT
Start: 2023-01-18 | End: 2023-01-18 | Stop reason: HOSPADM

## 2023-01-18 RX ORDER — FLUMAZENIL 0.1 MG/ML
0.2 INJECTION, SOLUTION INTRAVENOUS
Status: DISCONTINUED | OUTPATIENT
Start: 2023-01-18 | End: 2023-01-18 | Stop reason: HOSPADM

## 2023-01-18 RX ORDER — ASPIRIN 325 MG
325 TABLET ORAL ONCE
Status: COMPLETED | OUTPATIENT
Start: 2023-01-18 | End: 2023-01-18

## 2023-01-18 RX ORDER — POTASSIUM CHLORIDE 1500 MG/1
20 TABLET, EXTENDED RELEASE ORAL
Status: DISCONTINUED | OUTPATIENT
Start: 2023-01-18 | End: 2023-01-18 | Stop reason: HOSPADM

## 2023-01-18 RX ORDER — LIDOCAINE 40 MG/G
CREAM TOPICAL
Status: DISCONTINUED | OUTPATIENT
Start: 2023-01-18 | End: 2023-01-18

## 2023-01-18 RX ADMIN — SODIUM CHLORIDE: 9 INJECTION, SOLUTION INTRAVENOUS at 10:54

## 2023-01-18 ASSESSMENT — ACTIVITIES OF DAILY LIVING (ADL)
ADLS_ACUITY_SCORE: 35
ADLS_ACUITY_SCORE: 35

## 2023-01-18 NOTE — DISCHARGE INSTRUCTIONS
Coronary Angiography  Angiography is a special type of moving X-ray that lets your doctor view your coronary arteries to see if the blood vessels to your heart are narrowed or blocked. This test is done when someone is having a heart attack. Or it may be done if symptoms may mean a heart attack. It also may be done after an irregular stress test.    Before the procedure   Tell your healthcare team what medicines you take and any allergies you may have.  Tell your healthcare team if you've had a reaction to contrast dye or have had any kidney problems.  Don t eat or drink anything for at least 6 to 8 hours before the procedure. You will likely be told not to have anything after midnight, the night before the procedure.  A nurse will place an IV (intravenous) catheter in your vein to give fluids, and medicine to relieve pain and help you feel less anxious.  He or she will clean your skin and shave the area where the catheter will be inserted, if needed.    During the procedure   Your doctor will place a long, thin tube called a catheter inside an artery in your groin or arm and guide it into your heart. You may feel pressure with the insertion of the catheter.  He or she will inject a contrast dye through the catheter into your blood vessels or heart chambers. You may feel a warm sensation or feeling like you have to urinate when the contrast is injected. This is normal.  X-rays are taken to show images of the inside of your heart and coronary arteries.     The catheter can be placed into the groin, arm, or wrist.     After the procedure   Your healthcare team will tell you how long to lie down and keep the insertion site still.  If the insertion site was in your groin, you may need to lie down with your leg still for several hours. If the insertion site was in your wrist, a pressure bandage will be put on the site. It will be taken off when there is no sign of bleeding. If bleeding occurs, a nurse will put pressure  on the area to control it.  A nurse will check your blood pressure and the insertion site often. This is to make sure you remain stable after the procedure.  You may be asked to drink fluid to help flush the contrast liquid out of your system.  Have someone drive you home from the hospital.  If your doctor uses angioplasty or stent to treat a blocked artery, you may stay the night in the hospital. If there are multiple blockages that can't be fixed with a stent or angioplasty, you may need surgery to bypass the blockages (bypass surgery). Your doctor will explain the results of your test and what treatment options that may be best for you.  It s normal to find a small bruise or lump at the insertion site. The lump may be the collagen plug or stitch that you feel, or a small bruise. These common side effects should disappear within a few weeks.  You will be given instructions by your healthcare team on recovering from the coronary angiography. In general, don't lift anything heavier than a gallon of milk for several days. This gives time for the puncture site in the artery wall to heal. Try not to get the puncture site wet. Don't put it under water. Showers are OK. Don't soak in a bathtub, swimming pool, or hot tub until the skin has healed.    When to call your healthcare provider   Contact your healthcare provider right away if you have any of these:  Chest pain  Shortness of breath  Pain, swelling, redness, bleeding, or drainage at the insertion site  Severe pain, coldness, or a bluish color in the leg or arm that held the catheter  Fever over 100.4 F (38 C), or as advised by your provider  [employer] last reviewed this educational content on 6/1/2019 2000-2021 [employer]. All rights reserved. This information is not intended as a substitute for professional medical care. Always follow your healthcare professional's instructions.

## 2023-01-18 NOTE — PROGRESS NOTES
RN giving report: Marya VILLAFUERTE RN receiving report: Elizabeth DWYER:  Procedure performed: coronary angiogram    B:  Why procedure needed: abnormal stress test    A:  Assessment of area: clean coronaries right radial access site intact with TR band in place. 11mL air in TR band.     R:  Recommendations: bedrest and removed TR band per MD orders.      Family updated: pt declined. She states she can talk to her .

## 2023-01-18 NOTE — PRE-PROCEDURE
GENERAL PRE-PROCEDURE:   Procedure:  CAG possible PCI  Date/Time:  1/18/2023 12:00 PM    Verbal consent obtained?: Yes    Written consent obtained?: Yes    Risks and benefits: Risks, benefits and alternatives were discussed    DC Plan: Appropriate discharge home plan in place for patients who are going home after procedure   Consent given by:  Patient  Patient states understanding of procedure being performed: Yes    Patient's understanding of procedure matches consent: Yes    Procedure consent matches procedure scheduled: Yes    Expected level of sedation:  Moderate  Appropriately NPO:  Yes  ASA Class:  2  Mallampati  :  Grade 2- soft palate, base of uvula, tonsillar pillars, and portion of posterior pharyngeal wall visible  Lungs:  Lungs clear with good breath sounds bilaterally  Heart:  Normal heart sounds and rate  History & Physical reviewed:  History and physical reviewed and no updates needed  Statement of review:  I have reviewed the lab findings, diagnostic data, medications, and the plan for sedation      I have examined the patient, reviewed the history, medications and pre procedural tests.She has atypical chest pain with an abnormal stress test.  I have explained to the patient the risks of death, MI, stroke, hematoma, possible urgent bypass surgery for failed PCI, use of stents, thienopyridine agents, possible peripheral vascular complications, arrhythmia, the use of FFR in clinical decision-making and alternative of medical therapy alone in regards to left heart catheterization, left ventriculography, coronary angiography, and possible percutaneous coronary intervention. The patient voiced understanding and wishes to proceed. The patient has a good right radial pulse, normal ulnar pulse and a normal Mark's sign.

## 2023-01-18 NOTE — IP AVS SNAPSHOT
[facility]  [address]  [facility] [address]  Phone: [phone]  Fax: [phone]                                    After Visit Summary   1/18/2023    Joslyn Sanchez   MRN: [id number]           After Visit Summary Signature Page    I have received my discharge instructions, and my questions have been answered. I have discussed any challenges I see with this plan with the nurse or doctor.    ..........................................................................................................................................  Patient/Patient Representative Signature      ..........................................................................................................................................  Patient Representative Print Name and Relationship to Patient    ..................................................               ................................................  Date                                   Time    ..........................................................................................................................................  Reviewed by Signature/Title    ...................................................              ..............................................  Date                                               Time          22EPIC Rev 08/18

## 2023-01-18 NOTE — PROGRESS NOTES
Patient discharged to home with safe ride. Sent with all patient belongings. All questions and concerns addressed.     Elizabeth Valle RN on 1/18/2023 at 2:58 PM

## 2023-01-18 NOTE — IP AVS SNAPSHOT
After Visit Summary Template Not Found    This Print Group is only intended to be used in the After Visit Summary and can only be used in a report that uses a released After Visit Summary Template.                       MRN:[id number]                      After Visit Summary   1/18/2023    Joslyn Sanchez   MRN: [id number]           Visit Information        Department      1/18/2023  9:44 AM [facility]          Review of your medicines      UNREVIEWED medicines. Ask your doctor about these medicines       Dose / Directions   aspirin 81 MG EC tablet  Commonly known as: ASA      Dose: 81 mg  Take 1 tablet (81 mg) by mouth daily  Refills: 0     citalopram 20 MG tablet  Commonly known as: celeXA  Used for: Anxiety      Dose: 30 mg  Take 1.5 tablets (30 mg) by mouth daily  Quantity: 45 tablet  Refills: 11     DAILY MULTIVITAMIN PO      Dose: 1 tablet  Take 1 tablet by mouth daily  Refills: 0     ondansetron 4 MG ODT tab  Commonly known as: Zofran ODT  Used for: Other migraine without status migrainosus, not intractable      Dose: 4-8 mg  Take 1-2 tablets (4-8 mg) by mouth every 8 hours as needed for nausea  Quantity: 30 tablet  Refills: 3     oxyCODONE 5 MG tablet  Commonly known as: ROXICODONE  Used for: Other migraine without status migrainosus, not intractable      Dose: 5-10 mg  Take 1-2 tablets (5-10 mg) by mouth every 6 hours as needed (intractable migraine)  Quantity: 45 tablet  Refills: 0     rizatriptan 10 MG tablet  Commonly known as: MAXALT  Used for: Other migraine without status migrainosus, not intractable      TAKE ONE TABLET BY MOUTH AT ONSET OF MIGRAINE MAY REPEAT DOSE IN 2 HOURS MAX 30MG IN 24 HOURS  Quantity: 18 tablet  Refills: 11     rosuvastatin 10 MG tablet  Commonly known as: CRESTOR  Used for: Hyperlipidemia LDL goal <130      Dose: 10 mg  Take 1 tablet (10 mg) by mouth At Bedtime  Quantity: 90 tablet  Refills: 3              Protect others around you: Learn  "how to safely use, store and throw away your medicines at www.disposemymeds.org.       Follow-ups after your visit       Your next 10 appointments already scheduled    Jan 18, 2023  Procedure with Cardiologist MD Yolanda  [facility] ([facility] ) [address] [facility] [address]  [phone]   [facility] [address] [facility] [place] [id number].  Please park in the main hospital lot on the north side of the building.   Feb 03, 2023  9:00 AM  (Arrive by 8:55 AM)  Return Cardiology with Liya Pardo NP  [facility] ([facility] ) [address]  [facility] [address]  [phone]      Feb 20, 2023  1:30 PM  (Arrive by 1:10 PM)  Adult Preventative Visit with Lea Machado MD  [facility] ([facility] ) [address]  [address]  [place] [facility] [address] [phone]   Please plan to arrive at the clinic at your \"Arrive By\" time for your appointment. Our late policy will be enforced based on this time.  The purpose of this visit is to discuss your medical history and prevent health problems before you are sick. If you have additional concerns you would like to discuss outside of preventive care, you may be billed for that service.  If you have further questions, you may want to contact your insurance company to understand what is included in your preventive health benefits.        Care Instructions       Further instructions from your care team         Coronary Angiography  Angiography is a special type of moving X-ray that lets your doctor view your coronary arteries to see if the blood vessels to your heart are narrowed or blocked. This test is done when someone is having a heart attack. Or it may be done if symptoms may mean a heart attack. It also may be done after an " irregular stress test.    Before the procedure   Tell your healthcare team what medicines you take and any allergies you may have.  Tell your healthcare team if you've had a reaction to contrast dye or have had any kidney problems.  Don t eat or drink anything for at least 6 to 8 hours before the procedure. You will likely be told not to have anything after midnight, the night before the procedure.  A nurse will place an IV (intravenous) catheter in your vein to give fluids, and medicine to relieve pain and help you feel less anxious.  He or she will clean your skin and shave the area where the catheter will be inserted, if needed.    During the procedure   Your doctor will place a long, thin tube called a catheter inside an artery in your groin or arm and guide it into your heart. You may feel pressure with the insertion of the catheter.  He or she will inject a contrast dye through the catheter into your blood vessels or heart chambers. You may feel a warm sensation or feeling like you have to urinate when the contrast is injected. This is normal.  X-rays are taken to show images of the inside of your heart and coronary arteries.     The catheter can be placed into the groin, arm, or wrist.     After the procedure   Your healthcare team will tell you how long to lie down and keep the insertion site still.  If the insertion site was in your groin, you may need to lie down with your leg still for several hours. If the insertion site was in your wrist, a pressure bandage will be put on the site. It will be taken off when there is no sign of bleeding. If bleeding occurs, a nurse will put pressure on the area to control it.  A nurse will check your blood pressure and the insertion site often. This is to make sure you remain stable after the procedure.  You may be asked to drink fluid to help flush the contrast liquid out of your system.  Have someone drive you home from the hospital.  If your doctor uses angioplasty or  "stent to treat a blocked artery, you may stay the night in the hospital. If there are multiple blockages that can't be fixed with a stent or angioplasty, you may need surgery to bypass the blockages (bypass surgery). Your doctor will explain the results of your test and what treatment options that may be best for you.  It s normal to find a small bruise or lump at the insertion site. The lump may be the collagen plug or stitch that you feel, or a small bruise. These common side effects should disappear within a few weeks.  You will be given instructions by your healthcare team on recovering from the coronary angiography. In general, don't lift anything heavier than a gallon of milk for several days. This gives time for the puncture site in the artery wall to heal. Try not to get the puncture site wet. Don't put it under water. Showers are OK. Don't soak in a bathtub, swimming pool, or hot tub until the skin has healed.    When to call your healthcare provider   Contact your healthcare provider right away if you have any of these:  Chest pain  Shortness of breath  Pain, swelling, redness, bleeding, or drainage at the insertion site  Severe pain, coldness, or a bluish color in the leg or arm that held the catheter  Fever over 100.4 F (38 C), or as advised by your provider  [employer] last reviewed this educational content on 6/1/2019 2000-2021 [employer]. All rights reserved. This information is not intended as a substitute for professional medical care. Always follow your healthcare professional's instructions.          Additional Information About Your Visit       [employer] Information    [employer] lets you send messages to your doctor, view your test results, renew your prescriptions, schedule appointments and more. To sign up, go to [email or web address] . Click on \"Log in\" on the left side of the screen, which will take you to the Welcome page. Then click on \"Sign up Now\" on the right side of the " page.     You will be asked to enter the access code listed below, as well as some personal information. Please follow the directions to create your username and password.     Your access code is: [email or web address]  Expires: 2023  5:09 PM     Your access code will  in 60 days. If you need help or a new code, please call your   [facility] or [phone].       Care EveryWhere ID    This is your Care EveryWhere ID. This could be used by other organizations to access your [facility] medical records  [id number]       Your Vitals Were  Most recent update: 2023 11:02 AM    Blood Pressure   127/70 (BP Location: Right arm)          Pulse   56          Temperature   97.7  F (36.5  C) (Temporal)          Respirations   16          Last Period   2014             Pulse Oximetry   99%          Primary Care Provider  [facility] Phone #  [phone] Fax #  [phone]      Equal Access to Services    MARIAH STORY AH: Hadii aad ku hadasho Soomaali, waaxda luqadaha, qaybta kaalmada adeegyada, waxay idiin haytiffanie rodriguez . So [facility] [phone].    ATENCIÓN: Si habla español, tiene a collins disposición servicios gratuitos de asistencia lingüística. Llame al [phone].    We comply with applicable federal and state civil rights laws, including the [place] Human Rights Act. We do not discriminate on the basis of race, color, creed, Zoroastrianism, national origin, marital status, age, disability, sex, sexual orientation, or gender identity.    If you would like an itemization of your charges they will now be available in [employer] 30 days after discharge. To access the itemized statements in [employer] go to billing/billing summary. From there select view account. There will be multiple tabs showing an overview of your account, detail, payments, and communications. From the communications tab you can see your monthly statements, your itemized statements, and any  billing letters generated for your account. If you do not have a [employer] account and need help getting access please contact [employer] support at [phone].  If you would prefer to have your itemized statements mailed please contact our automated itemized bill request line at [phone] option  2.       Thank you!    Thank you for choosing [facility] for your care. Our goal is always to provide you with excellent care. Hearing back from our patients is one way we can continue to improve our services. Please take a few minutes to complete the written survey that you may receive in the mail after you visit. If you would like to speak to someone directly about your visit please contact Patient Relations at [phone]. Thank you!              Medication List      ASK your doctor about these medications          Morning Afternoon Evening Bedtime As Needed    aspirin 81 MG EC tablet  Also known as: ASA  INSTRUCTIONS: Take 1 tablet (81 mg) by mouth daily                     citalopram 20 MG tablet  Also known as: celeXA  INSTRUCTIONS: Take 1.5 tablets (30 mg) by mouth daily                     DAILY MULTIVITAMIN PO  INSTRUCTIONS: Take 1 tablet by mouth daily                     ondansetron 4 MG ODT tab  Also known as: Zofran ODT  INSTRUCTIONS: Take 1-2 tablets (4-8 mg) by mouth every 8 hours as needed for nausea                     oxyCODONE 5 MG tablet  Also known as: ROXICODONE  INSTRUCTIONS: Take 1-2 tablets (5-10 mg) by mouth every 6 hours as needed (intractable migraine)                     rizatriptan 10 MG tablet  Also known as: MAXALT  INSTRUCTIONS: TAKE ONE TABLET BY MOUTH AT ONSET OF MIGRAINE MAY REPEAT DOSE IN 2 HOURS MAX 30MG IN 24 HOURS                     rosuvastatin 10 MG tablet  Also known as: CRESTOR  INSTRUCTIONS: Take 1 tablet (10 mg) by mouth At Bedtime

## 2023-01-18 NOTE — PROCEDURES
[facility]    Procedure: *Cath without PCI    Date/Time: 1/18/2023 12:35 PM  Performed by: Samy Silveira MD  Authorized by: Samy Silveira MD       UNIVERSAL PROTOCOL   Site Marked: Yes  Prior Images Obtained and Reviewed:  Yes  Required items: Required blood products, implants, devices and special equipment available    Patient identity confirmed:  Verbally with patient  Patient was reevaluated immediately before administering moderate or deep sedation or anesthesia  Confirmation Checklist:  Patient's identity using two indicators  Time out: Immediately prior to the procedure a time out was called    Universal Protocol: the Joint Commission Universal Protocol was followed       ANESTHESIA    Local Anesthetic: Lidocaine 1% without epinephrine      SEDATION  Patient Sedated: Yes    Sedation:  Fentanyl and midazolam  Vital signs: Vital signs monitored during sedation      PROCEDURE    Patient Tolerance:  Patient tolerated the procedure well with no immediate complications  Length of time physician/provider present for 1:1 monitoring during sedation: 10

## 2023-01-19 LAB
ATRIAL RATE - MUSE: 56 BPM
DIASTOLIC BLOOD PRESSURE - MUSE: NORMAL MMHG
INTERPRETATION ECG - MUSE: NORMAL
P AXIS - MUSE: 50 DEGREES
PR INTERVAL - MUSE: 166 MS
QRS DURATION - MUSE: 106 MS
QT - MUSE: 450 MS
QTC - MUSE: 434 MS
R AXIS - MUSE: 62 DEGREES
SYSTOLIC BLOOD PRESSURE - MUSE: NORMAL MMHG
T AXIS - MUSE: 43 DEGREES
VENTRICULAR RATE- MUSE: 56 BPM

## 2023-01-23 ENCOUNTER — TRANSFERRED RECORDS (OUTPATIENT)
Dept: HEALTH INFORMATION MANAGEMENT | Facility: CLINIC | Age: 63
End: 2023-01-23

## 2023-02-03 ENCOUNTER — TRANSFERRED RECORDS (OUTPATIENT)
Dept: HEALTH INFORMATION MANAGEMENT | Facility: CLINIC | Age: 63
End: 2023-02-03

## 2023-02-03 ENCOUNTER — OFFICE VISIT (OUTPATIENT)
Dept: CARDIOLOGY | Facility: CLINIC | Age: 63
End: 2023-02-03
Attending: INTERNAL MEDICINE
Payer: COMMERCIAL

## 2023-02-03 VITALS
BODY MASS INDEX: 26.66 KG/M2 | SYSTOLIC BLOOD PRESSURE: 106 MMHG | WEIGHT: 180 LBS | HEART RATE: 65 BPM | HEIGHT: 69 IN | DIASTOLIC BLOOD PRESSURE: 70 MMHG

## 2023-02-03 DIAGNOSIS — R94.39 ABNORMAL STRESS TEST: ICD-10-CM

## 2023-02-03 PROCEDURE — 99214 OFFICE O/P EST MOD 30 MIN: CPT | Performed by: NURSE PRACTITIONER

## 2023-02-03 NOTE — LETTER
2/3/2023    [facility]  [address] [facility] [address] [facility] [address]    RE: Joslyn Sanchze       Dear Colleague,     I had the pleasure of seeing Joslyn Sanchez in the [facility].  Cardiology Clinic Progress Note  Joslyn Sanchez MRN# [id number]   YOB: 1960 Age: 62 year old   Primary Cardiologist: Dr. Arriola Reason for visit: follow up angiogram             Assessment and Plan:     1.  Chest heaviness, ASCVD risk score 3.4%  -Abnormal treadmill stress test in December 2022  -Subsequent coronary angiography 1/2023 showed no coronary artery disease with moderate size vessels  -Discussed there is no clinical indication for her to continue aspirin 81 mg and she can discontinue this today  -She did report having chest heaviness/tightness for several days following taking her Maxalt for migraines, I advise she discuss concerns for side effects from this medication with her primary care provider    2. Hyperlipidemia, goal LDL <100  -1/2023 rosuvastatin increased to 10 mg daily  -Although ASCVD risk scoring is low coronary angiogram was negative for coronary disease, patient has a long history of elevated LDL and is tolerating her rosuvastatin  -She would like to continue this which I believe is reasonable and she can follow-up with her PCP for annual lipid monitoring  -Discussed continuing a Mediterranean diet and increasing her exercise    Changes today: Discontinue aspirin 81 mg    Follow up plan: Patient to follow-up with cardiology on as-needed basis        History of Presenting Illness:    Joslyn Sanchez is a very pleasant 62 year old female with a history of dyslipidemia, obesity, sleep apnea (on CPAP), ulcerative colitis.    She was seen by Dr. Arriola in early January 2023 consultation following recurrent intermittent chest heaviness/tightness that developed over the holidays late 2022.  Symptoms waxed and waned in severity and were not  necessarily exertional in nature.  She underwent an EKG treadmill stress test that was abnormal with horizontal ST depression.  Her troponin, D-dimer, ESR, and CRP were all normal at that time.  Her resting echocardiogram demonstrated normal biventricular function, no wall motion abnormalities, mild aortic sclerosis with trace aortic insufficiency, and a trivial pericardial effusion.     She underwent coronary angiography 1/19/2023 which showed no coronary disease.    Patient is here today for follow up after angiogram. Patient reports feeling good.  She has not had any further chest heaviness/tightness since her coronary angiogram. Denies dizziness, lightheadedness or other presyncopal symptoms. Denies tachycardia or palpitations.  Denies shortness of breath, orthopnea, or PND.  No weight gains or edema    Her right wrist puncture site is healed, nontender, no bruising.    Blood pressure 106/70 and HR 65 in clinic today.        Recent Hospitalizations     None in [id number]        Social History    , 2 children  Social History     Socioeconomic History     Marital status:      Spouse name: Otilio     Number of children: 2     Years of education: Not on file     Highest education level: Not on file   Occupational History     Occupation: homemaker   Tobacco Use     Smoking status: Former     Types: Cigarettes     Smokeless tobacco: Never     Tobacco comments:     Quit 28 years ago   Vaping Use     Vaping Use: Not on file   Substance and Sexual Activity     Alcohol use: Yes     Alcohol/week: 0.0 standard drinks     Comment: socially      Drug use: No     Sexual activity: Yes     Partners: Male     Birth control/protection: None   Other Topics Concern     Parent/sibling w/ CABG, MI or angioplasty before 65F 55M? No   Social History Narrative     Not on file     Social Determinants of Health     Financial Resource Strain: Not on file   Food Insecurity: Not on file   Transportation Needs: Not on file  "  Physical Activity: Not on file   Stress: Not on file   Social Connections: Not on file   Intimate Partner Violence: Not on file   Housing Stability: Not on file            Review of Systems:   Skin:        Eyes:       ENT:       Respiratory:  Negative    Cardiovascular:  Negative    Gastroenterology:      Genitourinary:       Musculoskeletal:       Neurologic:       Psychiatric:       Heme/Lymph/Imm:       Endocrine:              Physical Exam:   Vitals: /70   Pulse 65   Ht 1.753 m (5' 9\")   Wt 81.6 kg (180 lb)   LMP 02/09/2014   BMI 26.58 kg/m     Wt Readings from Last 4 Encounters:   02/03/23 81.6 kg (180 lb)   01/10/23 84.1 kg (185 lb 8 oz)   12/21/21 84.8 kg (187 lb)   12/18/20 81.6 kg (180 lb)     GEN: well nourished, in no acute distress.  HEENT:  Pupils equal, round. Sclerae nonicteric.   NECK: Supple, no masses appreciated.   C/V:  Regular rate and rhythm, no murmur, rub or gallop.    RESP: Respirations are unlabored. Clear to auscultation bilaterally without wheezing, rales, or rhonchi.  GI: Abdomen soft, nontender.  EXTREM: no LE edema.  NEURO: Alert and oriented, cooperative.  SKIN: Warm and dry. Right wrist puncture site healed, non-tender, no bruising, and no bruit       Data:     LIPID RESULTS:  Lab Results   Component Value Date    CHOL 217 (H) 12/21/2021    CHOL 185 07/01/2021    HDL 65 12/21/2021    HDL 64 07/01/2021     (H) 12/21/2021     (H) 07/01/2021    TRIG 129 12/21/2021    TRIG 63 07/01/2021    CHOLHDLRATIO 2.4 08/06/2014     LIVER ENZYME RESULTS:  Lab Results   Component Value Date    AST 27 12/28/2022    AST 18 07/01/2021    ALT 22 12/28/2022    ALT 25 07/01/2021     CBC RESULTS:  Lab Results   Component Value Date    WBC 5.1 01/18/2023    WBC 5.4 12/18/2020    RBC 5.07 01/18/2023    RBC 5.19 12/18/2020    HGB 13.7 01/18/2023    HGB 14.3 12/18/2020    HCT 41.0 01/18/2023    HCT 41.7 12/18/2020    MCV 81 01/18/2023    MCV 80 12/18/2020    MCH 27.0 01/18/2023    MCH " 27.6 12/18/2020    MCHC 33.4 01/18/2023    MCHC 34.3 12/18/2020    RDW 12.8 01/18/2023    RDW 13.0 12/18/2020     01/18/2023     12/18/2020     BMP RESULTS:  Lab Results   Component Value Date     01/18/2023     07/01/2021    POTASSIUM 4.2 01/18/2023    POTASSIUM 4.0 12/21/2021    POTASSIUM 4.2 07/01/2021    CHLORIDE 100 01/18/2023    CHLORIDE 108 12/21/2021    CHLORIDE 109 07/01/2021    CO2 29 01/18/2023    CO2 25 12/21/2021    CO2 31 07/01/2021    ANIONGAP 10 01/18/2023    ANIONGAP 7 12/21/2021    ANIONGAP 1 (L) 07/01/2021    GLC 95 01/18/2023    GLC 98 12/21/2021    GLC 93 07/01/2021    BUN 15.5 01/18/2023    BUN 13 12/21/2021    BUN 24 07/01/2021    CR 0.80 01/18/2023    CR 0.92 07/01/2021    GFRESTIMATED 82 01/18/2023    GFRESTIMATED 66 07/01/2021    GFRESTBLACK 76 07/01/2021    LU 9.7 01/18/2023    LU 9.4 07/01/2021      A1C RESULTS:  Lab Results   Component Value Date    A1C 5.3 07/01/2021     INR RESULTS:  Lab Results   Component Value Date    INR 0.99 01/18/2023            Medications     Current Outpatient Medications   Medication Sig Dispense Refill     citalopram (CELEXA) 20 MG tablet Take 1.5 tablets (30 mg) by mouth daily 45 tablet 11     Multiple Vitamin (DAILY MULTIVITAMIN PO) Take 1 tablet by mouth daily       ondansetron (ZOFRAN ODT) 4 MG ODT tab Take 1-2 tablets (4-8 mg) by mouth every 8 hours as needed for nausea 30 tablet 3     oxyCODONE (ROXICODONE) 5 MG tablet Take 1-2 tablets (5-10 mg) by mouth every 6 hours as needed (intractable migraine) 45 tablet 0     rizatriptan (MAXALT) 10 MG tablet TAKE ONE TABLET BY MOUTH AT ONSET OF MIGRAINE MAY REPEAT DOSE IN 2 HOURS MAX 30MG IN 24 HOURS 18 tablet 11     rosuvastatin (CRESTOR) 10 MG tablet Take 1 tablet (10 mg) by mouth At Bedtime 90 tablet 3     aspirin (ASA) 81 MG EC tablet Take 1 tablet (81 mg) by mouth daily (Patient not taking: Reported on 2/3/2023)            Past Medical History     Past Medical History:    Diagnosis Date     Heavy menses      Migraine, unspecified, without mention of intractable migraine without mention of status migrainosus     intolerant imitrex     Mixed hyperlipidemia      MIGUEL on CPAP      Temporomandibular joint disorders, unspecified      Ulcerative colitis (H)      Past Surgical History:   Procedure Laterality Date     APPENDECTOMY       CV CORONARY ANGIOGRAM N/A 1/18/2023    Procedure: Coronary Angiogram;  Surgeon: Samy Silveira MD;  Location:  HE[facility] CARDIAC CATH LAB     CYSTOSCOPY N/A 2/18/2014    Procedure: CYSTOSCOPY;  Surgeon: Rama Burton MD;  Location: [facility] OR     KNEE SURGERY  4/2012    left knee, meniscus     laparascopy  x 2 in age 30s    Infertiility     LAPAROSCOPIC HYSTERECTOMY TOTAL, BILATERAL SALPINGO-OOPHORECTOMY, COMBINED Bilateral 2/18/2014    Procedure: COMBINED LAPAROSCOPIC HYSTERECTOMY TOTAL, SALPINGO-OOPHORECTOMY;  Surgeon: Rama Burton MD;  Location: [facility] OR     Family History   Problem Relation Age of Onset     Lipids Mother      Diabetes Maternal Grandmother      Diabetes Paternal Grandmother      Medical History Unknown Father             Allergies   No known drug allergies        Liya Pardo NP  [facility]  Pager: [phone]    Thank you for allowing me to participate in the care of your patient.      Sincerely,     Liya Pardo NP     [facility]  cc:   Teofilo Arriola MD  [address], [place] [address] [place] [id number]

## 2023-02-03 NOTE — PATIENT INSTRUCTIONS
Today's Recommendations    Your coronary angiogram did not show any coronary disease  You can discontinue your aspirin  You can discuss your concerns regarding maxalt/causes of chest heaviness with your primary care provider and they can also check your cholesterol  Please follow up with cardiology as needed    Please send a Tutor Assignment message or call [phone] to the RN team with questions or concerns.     Scheduling number [phone]  RENUKA Abbott, CNP

## 2023-02-03 NOTE — PROGRESS NOTES
Cardiology Clinic Progress Note  Joslyn Sanchez MRN# [id number]   YOB: 1960 Age: 62 year old   Primary Cardiologist: Dr. Arriola Reason for visit: follow up angiogram             Assessment and Plan:     1.  Chest heaviness, ASCVD risk score 3.4%  -Abnormal treadmill stress test in December 2022  -Subsequent coronary angiography 1/2023 showed no coronary artery disease with moderate size vessels  -Discussed there is no clinical indication for her to continue aspirin 81 mg and she can discontinue this today  -She did report having chest heaviness/tightness for several days following taking her Maxalt for migraines, I advise she discuss concerns for side effects from this medication with her primary care provider    2. Hyperlipidemia, goal LDL <100  -1/2023 rosuvastatin increased to 10 mg daily  -Although ASCVD risk scoring is low coronary angiogram was negative for coronary disease, patient has a long history of elevated LDL and is tolerating her rosuvastatin  -She would like to continue this which I believe is reasonable and she can follow-up with her PCP for annual lipid monitoring  -Discussed continuing a Mediterranean diet and increasing her exercise    Changes today: Discontinue aspirin 81 mg    Follow up plan: Patient to follow-up with cardiology on as-needed basis        History of Presenting Illness:    Joslyn Sanchez is a very pleasant 62 year old female with a history of dyslipidemia, obesity, sleep apnea (on CPAP), ulcerative colitis.    She was seen by Dr. Arriola in early January 2023 consultation following recurrent intermittent chest heaviness/tightness that developed over the holidays late 2022.  Symptoms waxed and waned in severity and were not necessarily exertional in nature.  She underwent an EKG treadmill stress test that was abnormal with horizontal ST depression.  Her troponin, D-dimer, ESR, and CRP were all normal at that time.  Her resting echocardiogram demonstrated normal  biventricular function, no wall motion abnormalities, mild aortic sclerosis with trace aortic insufficiency, and a trivial pericardial effusion.     She underwent coronary angiography 1/19/2023 which showed no coronary disease.    Patient is here today for follow up after angiogram. Patient reports feeling good.  She has not had any further chest heaviness/tightness since her coronary angiogram. Denies dizziness, lightheadedness or other presyncopal symptoms. Denies tachycardia or palpitations.  Denies shortness of breath, orthopnea, or PND.  No weight gains or edema    Her right wrist puncture site is healed, nontender, no bruising.    Blood pressure 106/70 and HR 65 in clinic today.        Recent Hospitalizations     None in [id number]        Social History    , 2 children  Social History     Socioeconomic History     Marital status:      Spouse name: Otilio     Number of children: 2     Years of education: Not on file     Highest education level: Not on file   Occupational History     Occupation: homemaker   Tobacco Use     Smoking status: Former     Types: Cigarettes     Smokeless tobacco: Never     Tobacco comments:     Quit 28 years ago   Vaping Use     Vaping Use: Not on file   Substance and Sexual Activity     Alcohol use: Yes     Alcohol/week: 0.0 standard drinks     Comment: socially      Drug use: No     Sexual activity: Yes     Partners: Male     Birth control/protection: None   Other Topics Concern     Parent/sibling w/ CABG, MI or angioplasty before 65F 55M? No   Social History Narrative     Not on file     Social Determinants of Health     Financial Resource Strain: Not on file   Food Insecurity: Not on file   Transportation Needs: Not on file   Physical Activity: Not on file   Stress: Not on file   Social Connections: Not on file   Intimate Partner Violence: Not on file   Housing Stability: Not on file            Review of Systems:   Skin:        Eyes:       ENT:       Respiratory:   "Negative    Cardiovascular:  Negative    Gastroenterology:      Genitourinary:       Musculoskeletal:       Neurologic:       Psychiatric:       Heme/Lymph/Imm:       Endocrine:              Physical Exam:   Vitals: /70   Pulse 65   Ht 1.753 m (5' 9\")   Wt 81.6 kg (180 lb)   LMP 02/09/2014   BMI 26.58 kg/m     Wt Readings from Last 4 Encounters:   02/03/23 81.6 kg (180 lb)   01/10/23 84.1 kg (185 lb 8 oz)   12/21/21 84.8 kg (187 lb)   12/18/20 81.6 kg (180 lb)     GEN: well nourished, in no acute distress.  HEENT:  Pupils equal, round. Sclerae nonicteric.   NECK: Supple, no masses appreciated.   C/V:  Regular rate and rhythm, no murmur, rub or gallop.    RESP: Respirations are unlabored. Clear to auscultation bilaterally without wheezing, rales, or rhonchi.  GI: Abdomen soft, nontender.  EXTREM: no LE edema.  NEURO: Alert and oriented, cooperative.  SKIN: Warm and dry. Right wrist puncture site healed, non-tender, no bruising, and no bruit       Data:     LIPID RESULTS:  Lab Results   Component Value Date    CHOL 217 (H) 12/21/2021    CHOL 185 07/01/2021    HDL 65 12/21/2021    HDL 64 07/01/2021     (H) 12/21/2021     (H) 07/01/2021    TRIG 129 12/21/2021    TRIG 63 07/01/2021    CHOLHDLRATIO 2.4 08/06/2014     LIVER ENZYME RESULTS:  Lab Results   Component Value Date    AST 27 12/28/2022    AST 18 07/01/2021    ALT 22 12/28/2022    ALT 25 07/01/2021     CBC RESULTS:  Lab Results   Component Value Date    WBC 5.1 01/18/2023    WBC 5.4 12/18/2020    RBC 5.07 01/18/2023    RBC 5.19 12/18/2020    HGB 13.7 01/18/2023    HGB 14.3 12/18/2020    HCT 41.0 01/18/2023    HCT 41.7 12/18/2020    MCV 81 01/18/2023    MCV 80 12/18/2020    MCH 27.0 01/18/2023    MCH 27.6 12/18/2020    MCHC 33.4 01/18/2023    MCHC 34.3 12/18/2020    RDW 12.8 01/18/2023    RDW 13.0 12/18/2020     01/18/2023     12/18/2020     BMP RESULTS:  Lab Results   Component Value Date     01/18/2023     " 07/01/2021    POTASSIUM 4.2 01/18/2023    POTASSIUM 4.0 12/21/2021    POTASSIUM 4.2 07/01/2021    CHLORIDE 100 01/18/2023    CHLORIDE 108 12/21/2021    CHLORIDE 109 07/01/2021    CO2 29 01/18/2023    CO2 25 12/21/2021    CO2 31 07/01/2021    ANIONGAP 10 01/18/2023    ANIONGAP 7 12/21/2021    ANIONGAP 1 (L) 07/01/2021    GLC 95 01/18/2023    GLC 98 12/21/2021    GLC 93 07/01/2021    BUN 15.5 01/18/2023    BUN 13 12/21/2021    BUN 24 07/01/2021    CR 0.80 01/18/2023    CR 0.92 07/01/2021    GFRESTIMATED 82 01/18/2023    GFRESTIMATED 66 07/01/2021    GFRESTBLACK 76 07/01/2021    LU 9.7 01/18/2023    LU 9.4 07/01/2021      A1C RESULTS:  Lab Results   Component Value Date    A1C 5.3 07/01/2021     INR RESULTS:  Lab Results   Component Value Date    INR 0.99 01/18/2023            Medications     Current Outpatient Medications   Medication Sig Dispense Refill     citalopram (CELEXA) 20 MG tablet Take 1.5 tablets (30 mg) by mouth daily 45 tablet 11     Multiple Vitamin (DAILY MULTIVITAMIN PO) Take 1 tablet by mouth daily       ondansetron (ZOFRAN ODT) 4 MG ODT tab Take 1-2 tablets (4-8 mg) by mouth every 8 hours as needed for nausea 30 tablet 3     oxyCODONE (ROXICODONE) 5 MG tablet Take 1-2 tablets (5-10 mg) by mouth every 6 hours as needed (intractable migraine) 45 tablet 0     rizatriptan (MAXALT) 10 MG tablet TAKE ONE TABLET BY MOUTH AT ONSET OF MIGRAINE MAY REPEAT DOSE IN 2 HOURS MAX 30MG IN 24 HOURS 18 tablet 11     rosuvastatin (CRESTOR) 10 MG tablet Take 1 tablet (10 mg) by mouth At Bedtime 90 tablet 3     aspirin (ASA) 81 MG EC tablet Take 1 tablet (81 mg) by mouth daily (Patient not taking: Reported on 2/3/2023)            Past Medical History     Past Medical History:   Diagnosis Date     Heavy menses      Migraine, unspecified, without mention of intractable migraine without mention of status migrainosus     intolerant imitrex     Mixed hyperlipidemia      MIGUEL on CPAP      Temporomandibular joint disorders,  unspecified      Ulcerative colitis (H)      Past Surgical History:   Procedure Laterality Date     APPENDECTOMY       CV CORONARY ANGIOGRAM N/A 1/18/2023    Procedure: Coronary Angiogram;  Surgeon: Samy Silveira MD;  Location:  HEART CARDIAC CATH LAB     CYSTOSCOPY N/A 2/18/2014    Procedure: CYSTOSCOPY;  Surgeon: Rama Burton MD;  Location: [facility] OR     KNEE SURGERY  4/2012    left knee, meniscus     laparascopy  x 2 in age 30s    Infertiility     LAPAROSCOPIC HYSTERECTOMY TOTAL, BILATERAL SALPINGO-OOPHORECTOMY, COMBINED Bilateral 2/18/2014    Procedure: COMBINED LAPAROSCOPIC HYSTERECTOMY TOTAL, SALPINGO-OOPHORECTOMY;  Surgeon: Rama Burton MD;  Location: [facility] OR     Family History   Problem Relation Age of Onset     Lipids Mother      Diabetes Maternal Grandmother      Diabetes Paternal Grandmother      Medical History Unknown Father             Allergies   No known drug allergies        Liya Pardo NP  [facility]ART CARE  Pager: [phone]

## 2023-02-12 ENCOUNTER — HEALTH MAINTENANCE LETTER (OUTPATIENT)
Age: 63
End: 2023-02-12

## 2023-02-20 ENCOUNTER — OFFICE VISIT (OUTPATIENT)
Dept: PEDIATRICS | Facility: CLINIC | Age: 63
End: 2023-02-20
Payer: COMMERCIAL

## 2023-02-20 VITALS
RESPIRATION RATE: 16 BRPM | DIASTOLIC BLOOD PRESSURE: 80 MMHG | HEART RATE: 69 BPM | SYSTOLIC BLOOD PRESSURE: 116 MMHG | WEIGHT: 184.1 LBS | TEMPERATURE: 98.3 F | BODY MASS INDEX: 28.9 KG/M2 | HEIGHT: 67 IN | OXYGEN SATURATION: 97 %

## 2023-02-20 DIAGNOSIS — K51.90 ULCERATIVE COLITIS WITHOUT COMPLICATIONS, UNSPECIFIED LOCATION (H): ICD-10-CM

## 2023-02-20 DIAGNOSIS — Z00.00 ROUTINE GENERAL MEDICAL EXAMINATION AT A HEALTH CARE FACILITY: Primary | ICD-10-CM

## 2023-02-20 DIAGNOSIS — Z23 NEED FOR TDAP VACCINATION: ICD-10-CM

## 2023-02-20 DIAGNOSIS — R35.0 URINARY FREQUENCY: ICD-10-CM

## 2023-02-20 DIAGNOSIS — E78.5 HYPERLIPIDEMIA LDL GOAL <130: ICD-10-CM

## 2023-02-20 DIAGNOSIS — F32.0 MAJOR DEPRESSIVE DISORDER, SINGLE EPISODE, MILD (H): ICD-10-CM

## 2023-02-20 DIAGNOSIS — R07.9 CHEST PAIN, UNSPECIFIED TYPE: ICD-10-CM

## 2023-02-20 LAB
ALBUMIN UR-MCNC: NEGATIVE MG/DL
APPEARANCE UR: CLEAR
BACTERIA #/AREA URNS HPF: ABNORMAL /HPF
BILIRUB UR QL STRIP: NEGATIVE
CHOLEST SERPL-MCNC: 183 MG/DL
COLOR UR AUTO: YELLOW
FERRITIN SERPL-MCNC: 21 NG/ML (ref 11–328)
GLUCOSE UR STRIP-MCNC: NEGATIVE MG/DL
HDLC SERPL-MCNC: 55 MG/DL
HGB UR QL STRIP: ABNORMAL
KETONES UR STRIP-MCNC: NEGATIVE MG/DL
LDLC SERPL CALC-MCNC: 71 MG/DL
LEUKOCYTE ESTERASE UR QL STRIP: NEGATIVE
NITRATE UR QL: NEGATIVE
NONHDLC SERPL-MCNC: 128 MG/DL
PH UR STRIP: 6 [PH] (ref 5–7)
RBC #/AREA URNS AUTO: ABNORMAL /HPF
SP GR UR STRIP: <=1.005 (ref 1–1.03)
SQUAMOUS #/AREA URNS AUTO: ABNORMAL /LPF
TRIGL SERPL-MCNC: 283 MG/DL
UROBILINOGEN UR STRIP-ACNC: 0.2 E.U./DL
WBC #/AREA URNS AUTO: ABNORMAL /HPF

## 2023-02-20 PROCEDURE — 80061 LIPID PANEL: CPT | Performed by: STUDENT IN AN ORGANIZED HEALTH CARE EDUCATION/TRAINING PROGRAM

## 2023-02-20 PROCEDURE — 82728 ASSAY OF FERRITIN: CPT | Performed by: STUDENT IN AN ORGANIZED HEALTH CARE EDUCATION/TRAINING PROGRAM

## 2023-02-20 PROCEDURE — 36415 COLL VENOUS BLD VENIPUNCTURE: CPT | Performed by: STUDENT IN AN ORGANIZED HEALTH CARE EDUCATION/TRAINING PROGRAM

## 2023-02-20 PROCEDURE — 99214 OFFICE O/P EST MOD 30 MIN: CPT | Mod: 25 | Performed by: STUDENT IN AN ORGANIZED HEALTH CARE EDUCATION/TRAINING PROGRAM

## 2023-02-20 PROCEDURE — 81001 URINALYSIS AUTO W/SCOPE: CPT | Performed by: STUDENT IN AN ORGANIZED HEALTH CARE EDUCATION/TRAINING PROGRAM

## 2023-02-20 PROCEDURE — 90715 TDAP VACCINE 7 YRS/> IM: CPT | Performed by: STUDENT IN AN ORGANIZED HEALTH CARE EDUCATION/TRAINING PROGRAM

## 2023-02-20 PROCEDURE — 90471 IMMUNIZATION ADMIN: CPT | Performed by: STUDENT IN AN ORGANIZED HEALTH CARE EDUCATION/TRAINING PROGRAM

## 2023-02-20 PROCEDURE — 99396 PREV VISIT EST AGE 40-64: CPT | Mod: 25 | Performed by: STUDENT IN AN ORGANIZED HEALTH CARE EDUCATION/TRAINING PROGRAM

## 2023-02-20 RX ORDER — OMEPRAZOLE 20 MG/1
20 TABLET, DELAYED RELEASE ORAL DAILY
Qty: 90 TABLET | Refills: 1 | Status: SHIPPED | OUTPATIENT
Start: 2023-02-20 | End: 2023-12-05

## 2023-02-20 SDOH — HEALTH STABILITY: PHYSICAL HEALTH: ON AVERAGE, HOW MANY DAYS PER WEEK DO YOU ENGAGE IN MODERATE TO STRENUOUS EXERCISE (LIKE A BRISK WALK)?: 4 DAYS

## 2023-02-20 SDOH — ECONOMIC STABILITY: INCOME INSECURITY: HOW HARD IS IT FOR YOU TO PAY FOR THE VERY BASICS LIKE FOOD, HOUSING, MEDICAL CARE, AND HEATING?: NOT HARD AT ALL

## 2023-02-20 SDOH — ECONOMIC STABILITY: TRANSPORTATION INSECURITY
IN THE PAST 12 MONTHS, HAS LACK OF TRANSPORTATION KEPT YOU FROM MEETINGS, WORK, OR FROM GETTING THINGS NEEDED FOR DAILY LIVING?: NO

## 2023-02-20 SDOH — ECONOMIC STABILITY: FOOD INSECURITY: WITHIN THE PAST 12 MONTHS, YOU WORRIED THAT YOUR FOOD WOULD RUN OUT BEFORE YOU GOT MONEY TO BUY MORE.: NEVER TRUE

## 2023-02-20 SDOH — HEALTH STABILITY: PHYSICAL HEALTH: ON AVERAGE, HOW MANY MINUTES DO YOU ENGAGE IN EXERCISE AT THIS LEVEL?: 40 MIN

## 2023-02-20 SDOH — ECONOMIC STABILITY: FOOD INSECURITY: WITHIN THE PAST 12 MONTHS, THE FOOD YOU BOUGHT JUST DIDN'T LAST AND YOU DIDN'T HAVE MONEY TO GET MORE.: NEVER TRUE

## 2023-02-20 SDOH — ECONOMIC STABILITY: INCOME INSECURITY: IN THE LAST 12 MONTHS, WAS THERE A TIME WHEN YOU WERE NOT ABLE TO PAY THE MORTGAGE OR RENT ON TIME?: NO

## 2023-02-20 SDOH — ECONOMIC STABILITY: TRANSPORTATION INSECURITY
IN THE PAST 12 MONTHS, HAS THE LACK OF TRANSPORTATION KEPT YOU FROM MEDICAL APPOINTMENTS OR FROM GETTING MEDICATIONS?: NO

## 2023-02-20 ASSESSMENT — ENCOUNTER SYMPTOMS
FREQUENCY: 1
NAUSEA: 1
HEADACHES: 1
ARTHRALGIAS: 1
HEARTBURN: 1
BREAST MASS: 0

## 2023-02-20 ASSESSMENT — LIFESTYLE VARIABLES
HOW OFTEN DO YOU HAVE SIX OR MORE DRINKS ON ONE OCCASION: PATIENT DECLINED
SKIP TO QUESTIONS 9-10: 0
HOW OFTEN DO YOU HAVE A DRINK CONTAINING ALCOHOL: MONTHLY OR LESS
HOW MANY STANDARD DRINKS CONTAINING ALCOHOL DO YOU HAVE ON A TYPICAL DAY: PATIENT DECLINED
AUDIT-C TOTAL SCORE: -1

## 2023-02-20 ASSESSMENT — SOCIAL DETERMINANTS OF HEALTH (SDOH)
IN A TYPICAL WEEK, HOW MANY TIMES DO YOU TALK ON THE PHONE WITH FAMILY, FRIENDS, OR NEIGHBORS?: TWICE A WEEK
HOW OFTEN DO YOU GET TOGETHER WITH FRIENDS OR RELATIVES?: ONCE A WEEK
DO YOU BELONG TO ANY CLUBS OR ORGANIZATIONS SUCH AS CHURCH GROUPS UNIONS, FRATERNAL OR ATHLETIC GROUPS, OR SCHOOL GROUPS?: NO
HOW OFTEN DO YOU ATTEND CHURCH OR RELIGIOUS SERVICES?: MORE THAN 4 TIMES PER YEAR

## 2023-02-20 ASSESSMENT — PAIN SCALES - GENERAL: PAINLEVEL: NO PAIN (0)

## 2023-02-20 ASSESSMENT — PATIENT HEALTH QUESTIONNAIRE - PHQ9
10. IF YOU CHECKED OFF ANY PROBLEMS, HOW DIFFICULT HAVE THESE PROBLEMS MADE IT FOR YOU TO DO YOUR WORK, TAKE CARE OF THINGS AT HOME, OR GET ALONG WITH OTHER PEOPLE: SOMEWHAT DIFFICULT
SUM OF ALL RESPONSES TO PHQ QUESTIONS 1-9: 8
SUM OF ALL RESPONSES TO PHQ QUESTIONS 1-9: 8

## 2023-02-20 NOTE — PATIENT INSTRUCTIONS
So nice to meet you!    If symptoms don't improve with the omeprazole (Prilosec) we should get the upper endoscopy      Could alternatively try Voltaren (diclofenac) gel       SHINGLES VACCINE:  If you are over 50 I recommend the Shingrix vaccine. Two doses of Shingrix provides more than 90% protection against shingles and postherpetic neuralgia (PHN), a type of chronic pain that is the most common complication of shingles.   - even if you've had the older shingles vaccine (Zostavax) it's okay to get the new vaccine.   - even if you've had shingles before the vaccine can be helpful.    Typically the best (and cheapest!) place to get this vaccine is our pharmacy downstairs. The vaccine is a two shot series - I recommend getting the second shot 2-6 months after the first dose.    You may have a sore arm and feel mild flu-like symptoms for a day or two after the vaccine. Most people do not need to adjust their regular activities. It's okay to take Tylenol or ibuprofen if you have side effects.        Preventive Health Recommendations  Female Ages 50 - 64    Yearly exam: See your health care provider every year in order to  Review health changes.   Discuss preventive care.    Review your medicines if your doctor has prescribed any.    Get a Pap test every three years (unless you have an abnormal result and your provider advises testing more often).  If you get Pap tests with HPV test, you only need to test every 5 years, unless you have an abnormal result.   You do not need a Pap test if your uterus was removed (hysterectomy) and you have not had cancer.  You should be tested each year for STDs (sexually transmitted diseases) if you're at risk.   Have a mammogram every 1 to 2 years.  Have a colonoscopy at age 50, or have a yearly FIT test (stool test). These exams screen for colon cancer.    Have a cholesterol test every 5 years, or more often if advised.  Have a diabetes test (fasting glucose) every three years. If you  are at risk for diabetes, you should have this test more often.   If you are at risk for osteoporosis (brittle bone disease), think about having a bone density scan (DEXA).    Shots: Get a flu shot each year. Get a tetanus shot every 10 years.    Nutrition:   Eat at least 5 servings of fruits and vegetables each day.  Eat whole-grain bread, whole-wheat pasta and brown rice instead of white grains and rice.  Get adequate Calcium and Vitamin D.     Lifestyle  Exercise at least 150 minutes a week (30 minutes a day, 5 days a week). This will help you control your weight and prevent disease.  Limit alcohol to one drink per day.  No smoking.   Wear sunscreen to prevent skin cancer.   See your dentist every six months for an exam and cleaning.  See your eye doctor every 1 to 2 years.    Preventive Health Recommendations  Female Ages 50 - 64    Yearly exam: See your health care provider every year in order to  Review health changes.   Discuss preventive care.    Review your medicines if your doctor has prescribed any.    Get a Pap test every three years (unless you have an abnormal result and your provider advises testing more often).  If you get Pap tests with HPV test, you only need to test every 5 years, unless you have an abnormal result.   You do not need a Pap test if your uterus was removed (hysterectomy) and you have not had cancer.  You should be tested each year for STDs (sexually transmitted diseases) if you're at risk.   Have a mammogram every 1 to 2 years.  Have a colonoscopy at age 50, or have a yearly FIT test (stool test). These exams screen for colon cancer.    Have a cholesterol test every 5 years, or more often if advised.  Have a diabetes test (fasting glucose) every three years. If you are at risk for diabetes, you should have this test more often.   If you are at risk for osteoporosis (brittle bone disease), think about having a bone density scan (DEXA).    Shots: Get a flu shot each year. Get a  tetanus shot every 10 years.    Nutrition:   Eat at least 5 servings of fruits and vegetables each day.  Eat whole-grain bread, whole-wheat pasta and brown rice instead of white grains and rice.  Get adequate Calcium and Vitamin D.     Lifestyle  Exercise at least 150 minutes a week (30 minutes a day, 5 days a week). This will help you control your weight and prevent disease.  Limit alcohol to one drink per day.  No smoking.   Wear sunscreen to prevent skin cancer.   See your dentist every six months for an exam and cleaning.  See your eye doctor every 1 to 2 years.

## 2023-02-20 NOTE — LETTER
"February 21, 2023      Joslyn Sanchez  [address]  [place] [address]        Dear Joslyn,     Here are your recent lab results:     Your cholesterol levels look great. Your total cholesterol was normal. Your LDL (\"bad cholesterol\") was normal. Your HDL (\"good cholesterol\", helps protect your heart) was normal.   Your trigylcerides are slightly elevated. Eating more good fats such as those in olive oil, canola oil, flaxseed and fish can help lower this as well as decreasing carbohydrate intake such as processed sugars, non-whole wheat breads/pasta/rice/cereals, alcohol, sweets.       Your ferritin, a test for iron stores, was normal.     The urinalysis is normal. The squamous cells and bacteria are normal contaminants. The machine noted trace blood but there were no actual red blood cells seen under the microscope.     Resulted Orders   Lipid panel reflex to direct LDL Non-fasting   Result Value Ref Range    Cholesterol 183 <200 mg/dL    Triglycerides 283 (H) <150 mg/dL    Direct Measure HDL 55 >=50 mg/dL    LDL Cholesterol Calculated 71 <=100 mg/dL    Non HDL Cholesterol 128 <130 mg/dL    Narrative    Cholesterol  Desirable:  <200 mg/dL    Triglycerides  Normal:  Less than 150 mg/dL  Borderline High:  150-199 mg/dL  High:  200-499 mg/dL  Very High:  Greater than or equal to 500 mg/dL    Direct Measure HDL  Female:  Greater than or equal to 50 mg/dL   Male:  Greater than or equal to 40 mg/dL    LDL Cholesterol  Desirable:  <100mg/dL  Above Desirable:  100-129 mg/dL   Borderline High:  130-159 mg/dL   High:  160-189 mg/dL   Very High:  >= 190 mg/dL    Non HDL Cholesterol  Desirable:  130 mg/dL  Above Desirable:  130-159 mg/dL  Borderline High:  160-189 mg/dL  High:  190-219 mg/dL  Very High:  Greater than or equal to 220 mg/dL   Ferritin   Result Value Ref Range    Ferritin 21 11 - 328 ng/mL   UA with Microscopic reflex to Culture - lab collect   Result Value Ref Range    Color Urine Yellow Colorless, " Straw, Light Yellow, Yellow    Appearance Urine Clear Clear    Glucose Urine Negative Negative mg/dL    Bilirubin Urine Negative Negative    Ketones Urine Negative Negative mg/dL    Specific Gravity Urine <=1.005 1.003 - 1.035    Blood Urine Trace (A) Negative    pH Urine 6.0 5.0 - 7.0    Protein Albumin Urine Negative Negative mg/dL    Urobilinogen Urine 0.2 0.2, 1.0 E.U./dL    Nitrite Urine Negative Negative    Leukocyte Esterase Urine Negative Negative   Urine Microscopic   Result Value Ref Range    Bacteria Urine Few (A) None Seen /HPF    RBC Urine 0-2 0-2 /HPF /HPF    WBC Urine 0-5 0-5 /HPF /HPF    Squamous Epithelials Urine Few (A) None Seen /LPF    Narrative    Urine Culture not indicated     Please let us know if you have questions or concerns.         Lea Linder MD   Internal Medicine & Pediatrics   [facility]

## 2023-02-20 NOTE — PROGRESS NOTES
SUBJECTIVE:   CC: Joslyn is an 62 year old who presents for preventive health visit.     Patient has been advised of split billing requirements and indicates understanding: Yes  Healthy Habits:     Getting at least 3 servings of Calcium per day:  Yes    Bi-annual eye exam:  Yes    Dental care twice a year:  Yes    Sleep apnea or symptoms of sleep apnea:  Sleep apnea    Diet:  Low fat/cholesterol    Frequency of exercise:  2-3 days/week    Duration of exercise:  45-60 minutes    Taking medications regularly:  Yes    Medication side effects:  Not applicable    PHQ-2 Total Score: 0    Additional concerns today:  Yes    Chest pains  -when take deep breath  -sometimes nauseated  -sometimes chews tums  -normal cardiology work up includes angiography      Scheduled for colonoscopy April 2023  -history ulcerative colitis    Today's PHQ-2 Score:   PHQ-2 ( 1999 [employer]) 2/20/2023   Q1: Little interest or pleasure in doing things 0   Q2: Feeling down, depressed or hopeless 0   PHQ-2 Score 0   PHQ-2 Total Score (12-17 Years)- Positive if 3 or more points; Administer PHQ-A if positive -   Q1: Little interest or pleasure in doing things Not at all   Q2: Feeling down, depressed or hopeless Not at all   PHQ-2 Score 0         Social History     Tobacco Use     Smoking status: Former     Types: Cigarettes     Smokeless tobacco: Never     Tobacco comments:     Quit 28 years ago   Substance Use Topics     Alcohol use: Yes     Alcohol/week: 0.0 standard drinks     Comment: socially      If you drink alcohol do you typically have >3 drinks per day or >7 drinks per week? No    Alcohol Use 2/20/2023   Prescreen: >3 drinks/day or >7 drinks/week? No   Prescreen: >3 drinks/day or >7 drinks/week? -       Reviewed orders with patient.  Reviewed health maintenance and updated orders accordingly - Yes    Breast Cancer Screening:    Breast CA Risk Assessment (FHS-7) 2/20/2023   Do you have a family history of breast, colon, or ovarian cancer?  "No / Unknown     Mammogram Screening: Recommended mammography every 1-2 years with patient discussion and risk factor consideration  Pertinent mammograms are reviewed under the imaging tab.    History of abnormal Pap smear: Status post benign hysterectomy. Health Maintenance and Surgical History updated.     Reviewed and updated as needed this visit by clinical staff   Tobacco  Allergies  Meds              Reviewed and updated as needed this visit by Provider                 Review of Systems   Cardiovascular: Positive for chest pain.   Gastrointestinal: Positive for heartburn and nausea.   Breasts:  Negative for tenderness, breast mass and discharge.   Genitourinary: Positive for frequency. Negative for pelvic pain, vaginal bleeding and vaginal discharge.   Musculoskeletal: Positive for arthralgias.   Neurological: Positive for headaches.     OBJECTIVE:   /80   Pulse 69   Temp 98.3  F (36.8  C) (Temporal)   Resp 16   Ht 1.71 m (5' 7.32\")   Wt 83.5 kg (184 lb 1.6 oz)   LMP 02/09/2014   SpO2 97%   BMI 28.56 kg/m    Physical Exam  GENERAL: healthy, alert and no distress  EYES: Eyes grossly normal to inspection, and conjunctivae and sclerae normal  HENT: ear canals and TM's normal, nose and mouth without ulcers or lesions  NECK: no adenopathy, no asymmetry, masses, or scars and thyroid normal to palpation  RESP: lungs clear to auscultation - no rales, rhonchi or wheezes  CV: regular rate and rhythm, normal S1 S2, no S3 or S4, no murmur, click or rub, no peripheral edema and peripheral pulses strong  ABDOMEN: soft, nontender, no hepatosplenomegaly, no masses   MS: no gross musculoskeletal defects noted, no edema  SKIN: no suspicious lesions or rashes  NEURO: Normal strength and tone, mentation intact and speech normal  PSYCH: mentation appears normal, affect normal/bright      ASSESSMENT/PLAN:       ICD-10-CM    1. Routine general medical examination at a health care facility  Z00.00       2. " "Hyperlipidemia LDL goal <130  E78.5 Lipid panel reflex to direct LDL Non-fasting      3. Chest pain, unspecified type  R07.9 omeprazole (PRILOSEC OTC) 20 MG EC tablet      4. Major depressive disorder, single episode, mild (H)  F32.0       5. Ulcerative colitis without complications, unspecified location (H)  K51.90 Ferritin      6. Urinary frequency  R35.0 UA with Microscopic reflex to Culture - lab collect      7. Need for Tdap vaccination  Z23 TDAP VACCINE (Adacel, Boostrix)        2. On rosuvastatin 10mg daily. Had leg cramps on atorvastatin. Primary prevention.  3. Normal cardiology work up including angiography. Recommend trial of PPI and proceed with upper endoscopy if persistent symptoms.  4. Continue celexa.   5. Gets colonoscopy every 2 years. Not on medications.  6. Evaluation for UTI (urinary tract infection).    COUNSELING:  Reviewed preventive health counseling, as reflected in patient instructions      BMI:   Estimated body mass index is 28.56 kg/m  as calculated from the following:    Height as of this encounter: 1.71 m (5' 7.32\").    Weight as of this encounter: 83.5 kg (184 lb 1.6 oz).         She reports that she has quit smoking. Her smoking use included cigarettes. She has never used smokeless tobacco.          Lea Machado MD  [facility] [place]  Answers for HPI/ROS submitted by the patient on 2/20/2023  If you checked off any problems, how difficult have these problems made it for you to do your work, take care of things at home, or get along with other people?: Somewhat difficult  PHQ9 TOTAL SCORE: 8      "

## 2023-04-11 ENCOUNTER — TRANSFERRED RECORDS (OUTPATIENT)
Dept: HEALTH INFORMATION MANAGEMENT | Facility: CLINIC | Age: 63
End: 2023-04-11
Payer: COMMERCIAL

## 2023-06-14 ENCOUNTER — TELEPHONE (OUTPATIENT)
Dept: PEDIATRICS | Facility: CLINIC | Age: 63
End: 2023-06-14
Payer: COMMERCIAL

## 2023-06-14 NOTE — TELEPHONE ENCOUNTER
Reason for Call:  Appointment Request    Patient requesting this type of appt: Chronic Diease Management/Medication/Follow-Up    Requested provider: Lea Machado    Reason patient unable to be scheduled: Not within requested timeframe    When does patient want to be seen/preferred time: 3-7 days    Comments: Would like to discuss healthcare further, prilosec is not making a difference, she tried to schedule a follow up but the soonest available middle of August.  Or would Dr. Machado want her another provider at the clinic or schedule an upper GI.    Could we send this information to you in [facility] or would you prefer to receive a phone call?:   Patient would prefer a phone call   Okay to leave a detailed message?: Yes at Cell number on file:    Telephone Information:   Mobile [phone]       Call taken on 6/14/2023 at 11:59 AM by Shima Daniel

## 2023-06-14 NOTE — TELEPHONE ENCOUNTER
Call placed to pt to get more information  Pt reports that the chest pressure and and discomfort is gone- she only had this every 6 months or so  Pt reports feeling of nausea and fullness does not seem to be related to eating  She has been taking the Prilosec daily    Pt has had a negative cardiac work up    Dr. Machado,  You sent her a [facility] [employer] (PRN) message in April asking how she is doing  Okay for an E-visit? For next steps?    Thank you  Too Nichols RN on 6/14/2023 at 1:22 PM

## 2023-06-14 NOTE — TELEPHONE ENCOUNTER
Can schedule with Elif.    Lea Machado MD  Internal Medicine & Pediatrics  [facility] [place]  She/her

## 2023-06-14 NOTE — TELEPHONE ENCOUNTER
Could triage obtain more information?    E-Visit or should patient schedule upper GI, see a different provider?    Thank you  Eliot JORDAN

## 2023-06-15 NOTE — TELEPHONE ENCOUNTER
Outgoing call spoke to patient.    Scheduled with Elif tomorrow Friday June 16th.    Thank you  Eliot JORDAN

## 2023-06-16 ENCOUNTER — OFFICE VISIT (OUTPATIENT)
Dept: PEDIATRICS | Facility: CLINIC | Age: 63
End: 2023-06-16
Payer: COMMERCIAL

## 2023-06-16 VITALS
BODY MASS INDEX: 28.54 KG/M2 | RESPIRATION RATE: 16 BRPM | SYSTOLIC BLOOD PRESSURE: 112 MMHG | OXYGEN SATURATION: 98 % | WEIGHT: 184 LBS | HEART RATE: 59 BPM | TEMPERATURE: 97.7 F | DIASTOLIC BLOOD PRESSURE: 70 MMHG

## 2023-06-16 DIAGNOSIS — F41.9 ANXIETY: Primary | ICD-10-CM

## 2023-06-16 DIAGNOSIS — K21.00 GASTROESOPHAGEAL REFLUX DISEASE WITH ESOPHAGITIS WITHOUT HEMORRHAGE: ICD-10-CM

## 2023-06-16 PROCEDURE — 99214 OFFICE O/P EST MOD 30 MIN: CPT | Performed by: PHYSICIAN ASSISTANT

## 2023-06-16 RX ORDER — ESOMEPRAZOLE MAGNESIUM 40 MG/1
40 CAPSULE, DELAYED RELEASE ORAL
Qty: 30 CAPSULE | Refills: 1 | Status: SHIPPED | OUTPATIENT
Start: 2023-06-16 | End: 2023-07-12

## 2023-06-16 RX ORDER — CITALOPRAM HYDROBROMIDE 40 MG/1
40 TABLET ORAL DAILY
Qty: 30 TABLET | Refills: 3 | Status: SHIPPED | OUTPATIENT
Start: 2023-06-16 | End: 2023-07-12

## 2023-06-16 ASSESSMENT — PAIN SCALES - GENERAL: PAINLEVEL: NO PAIN (0)

## 2023-06-16 NOTE — PATIENT INSTRUCTIONS
Increase celexa   Stop prilosec. Begin nexium as directed  Send [email or web address] message in one month with an update

## 2023-06-16 NOTE — PROGRESS NOTES
"  Assessment & Plan     Anxiety  Increase celexa to 40 mg daily.  - citalopram (CELEXA) 40 MG tablet; Take 1 tablet (40 mg) by mouth daily    Gastroesophageal reflux disease with esophagitis without hemorrhage  Discontinue omeprazole and begin nexium daily. Keep a record of symptoms.   Close follow up in one month. If no improvement, will proceed with endoscopy.  - esomeprazole (NEXIUM) 40 MG DR capsule; Take 1 capsule (40 mg) by mouth every morning (before breakfast) Take 30-60 minutes before eating.    [facility] [place] is a 63 year old, presenting for the following health issues:  RECHECK      HPI   Patient having fullness in throat. Intermittent. Nauseated. No abdominal pain. Esophageal reflux. Taking prilosec 20 mg once daily. Tums PRN and \"may help.\" nonspecific. No triggers. Unable to find cause. Having more times than not.     History of anxiety. On celexa 30 mg. Anxiety \"unsure if controlled.\"     History of ulcer x one.     Review of Systems   Constitutional, HEENT, cardiovascular, pulmonary, gi and gu systems are negative, except as otherwise noted.      Objective    /70   Pulse 59   Temp 97.7  F (36.5  C)   Resp 16   Wt 83.5 kg (184 lb)   LMP 02/09/2014   SpO2 98%   BMI 28.54 kg/m    Body mass index is 28.54 kg/m .  Physical Exam   GENERAL: alert and no distress  EYES: Eyes grossly normal to inspection, PERRL and conjunctivae and sclerae normal  HENT: mouth without ulcers or lesions  NECK: no adenopathy, thyroid wnl  RESP: lungs clear to auscultation - no rales, rhonchi or wheezes  CV: regular rate and rhythm, normal S1 S2, no S3 or S4  ABDOMEN: soft, nontender      No results found for any visits on 06/16/23.    "

## 2023-07-08 DIAGNOSIS — F41.9 ANXIETY: ICD-10-CM

## 2023-07-08 DIAGNOSIS — K21.00 GASTROESOPHAGEAL REFLUX DISEASE WITH ESOPHAGITIS WITHOUT HEMORRHAGE: ICD-10-CM

## 2023-07-11 RX ORDER — CITALOPRAM HYDROBROMIDE 40 MG/1
TABLET ORAL
Qty: 30 TABLET | Refills: 3 | OUTPATIENT
Start: 2023-07-11

## 2023-07-11 RX ORDER — ESOMEPRAZOLE MAGNESIUM 40 MG/1
40 CAPSULE, DELAYED RELEASE ORAL
Qty: 30 CAPSULE | Refills: 1 | OUTPATIENT
Start: 2023-07-11

## 2023-07-12 ENCOUNTER — TELEPHONE (OUTPATIENT)
Dept: PEDIATRICS | Facility: CLINIC | Age: 63
End: 2023-07-12
Payer: MEDICARE

## 2023-07-12 DIAGNOSIS — F41.9 ANXIETY: ICD-10-CM

## 2023-07-12 DIAGNOSIS — K21.00 GASTROESOPHAGEAL REFLUX DISEASE WITH ESOPHAGITIS WITHOUT HEMORRHAGE: ICD-10-CM

## 2023-07-12 RX ORDER — ESOMEPRAZOLE MAGNESIUM 40 MG/1
40 CAPSULE, DELAYED RELEASE ORAL
Qty: 30 CAPSULE | Refills: 11 | Status: SHIPPED | OUTPATIENT
Start: 2023-07-12 | End: 2023-12-05

## 2023-07-12 NOTE — PROGRESS NOTES
Ok refills of nexium sent. Patient has refills of celexa 30 mg on file. Return to that dose.   Jane Hancock PA-C

## 2023-07-12 NOTE — TELEPHONE ENCOUNTER
Call placed to pt  LM with message from provider advised to call back if she has questions    Too Nichols RN on 7/12/2023 at 10:25 AM

## 2023-07-12 NOTE — TELEPHONE ENCOUNTER
Ok to reduce celexa to 30 mg. Refills on file.     nexium rx x one year sent.     Jane Hancock PA-C

## 2023-07-12 NOTE — TELEPHONE ENCOUNTER
"Received call from patient with update for V.G. regarding nexium. Last OV w/ V.G. 6/16/23. Patient states nexium is working really well, no longer having nauseous feeling. Patient's citalopram was also increased to 40mg, but patient states this increase dose was making her sweaty and \"did not agree with\" her. Patient tried the new increased dose for 3 weeks. Patient went back to 30mg dose 1 week ago and would like to stay on that dose.     Patient asking for refill requests for nexium. Per chart review, patient should have refill on file. Patient states pharmacy gave her refills on citalopram at 30mg dose as they had some refills on file. Ok to discontinue citalopram 40mg?     RN called [facility] pharmacy regarding refill for nexium. Pharmacy does have refill on file, RN called and informed patient.     Sukhjinder JENKINS RN 7/12/2023 at 9:45 AM        "

## 2023-08-18 ENCOUNTER — TRANSFERRED RECORDS (OUTPATIENT)
Dept: HEALTH INFORMATION MANAGEMENT | Facility: CLINIC | Age: 63
End: 2023-08-18
Payer: MEDICARE

## 2023-09-10 ENCOUNTER — OFFICE VISIT (OUTPATIENT)
Dept: URGENT CARE | Facility: URGENT CARE | Age: 63
End: 2023-09-10
Payer: COMMERCIAL

## 2023-09-10 VITALS
HEART RATE: 63 BPM | TEMPERATURE: 97.9 F | OXYGEN SATURATION: 100 % | DIASTOLIC BLOOD PRESSURE: 78 MMHG | RESPIRATION RATE: 16 BRPM | SYSTOLIC BLOOD PRESSURE: 124 MMHG

## 2023-09-10 DIAGNOSIS — G43.019 INTRACTABLE MIGRAINE WITHOUT AURA AND WITHOUT STATUS MIGRAINOSUS: Primary | ICD-10-CM

## 2023-09-10 DIAGNOSIS — R11.0 NAUSEA: ICD-10-CM

## 2023-09-10 PROCEDURE — 96372 THER/PROPH/DIAG INJ SC/IM: CPT | Performed by: FAMILY MEDICINE

## 2023-09-10 PROCEDURE — 99214 OFFICE O/P EST MOD 30 MIN: CPT | Mod: 25 | Performed by: FAMILY MEDICINE

## 2023-09-10 RX ORDER — KETOROLAC TROMETHAMINE 30 MG/ML
30 INJECTION, SOLUTION INTRAMUSCULAR; INTRAVENOUS ONCE
Status: CANCELLED | OUTPATIENT
Start: 2023-09-10 | End: 2023-09-10

## 2023-09-10 RX ORDER — ONDANSETRON 8 MG/1
8 TABLET, ORALLY DISINTEGRATING ORAL EVERY 8 HOURS PRN
Qty: 30 TABLET | Refills: 1 | Status: SHIPPED | OUTPATIENT
Start: 2023-09-10

## 2023-09-10 RX ORDER — KETOROLAC TROMETHAMINE 30 MG/ML
30 INJECTION, SOLUTION INTRAMUSCULAR; INTRAVENOUS ONCE
Status: COMPLETED | OUTPATIENT
Start: 2023-09-10 | End: 2023-09-10

## 2023-09-10 RX ADMIN — KETOROLAC TROMETHAMINE 30 MG: 30 INJECTION, SOLUTION INTRAMUSCULAR; INTRAVENOUS at 11:44

## 2023-09-10 NOTE — PROGRESS NOTES
SUBJECTIVE:  Joslyn Sanchez is a 63 year old female with a history of migraines.  She comes in for evaluation of a worsening headache (at first on the left head but then the headache moved to the top of the right head and then to the right eye region) accompanied by light sensitivity, nausea.  .  Headache began two nights ago and is worsening  DESCRIPTION OF HEADACHE:   Character of pain:throbbing pain.    Severity of pain: severe   Accompanying symptoms: nausea and photophobia.      History of Migranes: yes   Are most headaches similar in presentation? Yes.    Sometimes lack of sleep may trigger migraines.      CURRENT USE OF MEDS TO TREAT HA:   Abortive meds? Maxalt (two doses over the past 24 hours), Vicodin, Zofran (4 mg PO Q8 hours PRN) without any relief of the headache.      Daily use? no   Prophylactic meds? None.     ADDITIONAL RELEVANT HISTORY:  Exposure to carbon monoxide? Not applicable.    Neurologic ROS: no weakness/numbness.  .    Past Medical History:   Diagnosis Date    Heavy menses     Migraine, unspecified, without mention of intractable migraine without mention of status migrainosus     intolerant imitrex    Mixed hyperlipidemia     MIGUEL on CPAP     Temporomandibular joint disorders, unspecified     Ulcerative colitis (H)      Current Outpatient Medications   Medication Sig Dispense Refill    citalopram (CELEXA) 20 MG tablet Take 1.5 tablets (30 mg) by mouth daily 45 tablet 11    esomeprazole (NEXIUM) 40 MG DR capsule Take 1 capsule (40 mg) by mouth every morning (before breakfast) Take 30-60 minutes before eating. 30 capsule 11    Multiple Vitamin (DAILY MULTIVITAMIN PO) Take 1 tablet by mouth daily      ondansetron (ZOFRAN ODT) 4 MG ODT tab Take 1-2 tablets (4-8 mg) by mouth every 8 hours as needed for nausea 30 tablet 3    oxyCODONE (ROXICODONE) 5 MG tablet Take 1-2 tablets (5-10 mg) by mouth every 6 hours as needed (intractable migraine) 45 tablet 0    rizatriptan (MAXALT) 10 MG tablet  "TAKE ONE TABLET BY MOUTH AT ONSET OF MIGRAINE MAY REPEAT DOSE IN 2 HOURS MAX 30MG IN 24 HOURS 18 tablet 11    rosuvastatin (CRESTOR) 10 MG tablet Take 1 tablet (10 mg) by mouth At Bedtime 90 tablet 3    omeprazole (PRILOSEC OTC) 20 MG EC tablet Take 1 tablet (20 mg) by mouth daily (Patient not taking: Reported on 6/16/2023) 90 tablet 1     Social History     Tobacco Use    Smoking status: Former     Types: Cigarettes    Smokeless tobacco: Never    Tobacco comments:     Quit 28 years ago   Substance Use Topics    Alcohol use: Yes     Alcohol/week: 0.0 standard drinks of alcohol     Comment: socially        ROS:   CONSTITUTIONAL:negative for fevers.   NEURO:  positive for headache.    GI:  positive for numbness.      OBJECTIVE:  /78   Pulse 63   Temp 97.9  F (36.6  C)   Resp 16   LMP 02/09/2014   SpO2 100%   GENERAL APPEARANCE: healthy, alert and in pain.  She is lying on her side in the dark   EYES: photophobia is present.  Pupils equally round and reactive to light.  Extraocular movements intact.    NEURO: mentation intact, speech normal, mentation intact, cranial nerves 2-12 intact, normal strength throughout, and deep tendon reflexes 2/4 at the biceps and wrists.      ASSESSMENT:  Intractable migraine without aura   Nausea    PLAN:  See orders in Epic  Patient received Ketorolac 30 mg IM during this clinic encounter.  About 30 minutes after the injection was administered, patient felt that the Ketorolac \"took the edge off\" but patient still feels nauseous and still feels as is she was \"hit by a truck.\"  .  Continue resting at home.  Go to the emergency room if the headache keeps worsening or persisting.       For the nausea:  Rx:  Ondansetron ODT (with increased dose).  8 mg PO Q8 hours PRN nausea        Khanh Cronin MD    "

## 2023-09-10 NOTE — PATIENT INSTRUCTIONS
Continue resting at home.  Go to the emergency room if the headache keeps worsening or persisting.      Take 8 mg of Ondansetron (Zofran) by mouth every 8 hours as needed for the nausea.

## 2023-12-05 ENCOUNTER — OFFICE VISIT (OUTPATIENT)
Dept: PEDIATRICS | Facility: CLINIC | Age: 63
End: 2023-12-05
Payer: COMMERCIAL

## 2023-12-05 VITALS
DIASTOLIC BLOOD PRESSURE: 62 MMHG | HEART RATE: 65 BPM | TEMPERATURE: 98.3 F | SYSTOLIC BLOOD PRESSURE: 116 MMHG | OXYGEN SATURATION: 97 % | RESPIRATION RATE: 16 BRPM

## 2023-12-05 DIAGNOSIS — Z13.1 SCREENING FOR DIABETES MELLITUS: ICD-10-CM

## 2023-12-05 DIAGNOSIS — E78.5 HYPERLIPIDEMIA LDL GOAL <130: ICD-10-CM

## 2023-12-05 DIAGNOSIS — R09.A2 GLOBUS SENSATION: Primary | ICD-10-CM

## 2023-12-05 DIAGNOSIS — G43.809 OTHER MIGRAINE WITHOUT STATUS MIGRAINOSUS, NOT INTRACTABLE: ICD-10-CM

## 2023-12-05 LAB
ALBUMIN SERPL BCG-MCNC: 4.7 G/DL (ref 3.5–5.2)
ALP SERPL-CCNC: 83 U/L (ref 40–150)
ALT SERPL W P-5'-P-CCNC: 19 U/L (ref 0–50)
ANION GAP SERPL CALCULATED.3IONS-SCNC: 10 MMOL/L (ref 7–15)
AST SERPL W P-5'-P-CCNC: 25 U/L (ref 0–45)
BILIRUB SERPL-MCNC: 0.9 MG/DL
BUN SERPL-MCNC: 18.6 MG/DL (ref 8–23)
CALCIUM SERPL-MCNC: 9.6 MG/DL (ref 8.8–10.2)
CHLORIDE SERPL-SCNC: 102 MMOL/L (ref 98–107)
CHOLEST SERPL-MCNC: 177 MG/DL
CREAT SERPL-MCNC: 0.95 MG/DL (ref 0.51–0.95)
DEPRECATED HCO3 PLAS-SCNC: 26 MMOL/L (ref 22–29)
EGFRCR SERPLBLD CKD-EPI 2021: 66 ML/MIN/1.73M2
ERYTHROCYTE [DISTWIDTH] IN BLOOD BY AUTOMATED COUNT: 14.9 % (ref 10–15)
FASTING STATUS PATIENT QL REPORTED: NO
FERRITIN SERPL-MCNC: 16 NG/ML (ref 11–328)
GLUCOSE SERPL-MCNC: 94 MG/DL (ref 70–99)
HBA1C MFR BLD: 5.3 % (ref 0–5.6)
HCT VFR BLD AUTO: 36.7 % (ref 35–47)
HDLC SERPL-MCNC: 56 MG/DL
HGB BLD-MCNC: 11.8 G/DL (ref 11.7–15.7)
LDLC SERPL CALC-MCNC: 70 MG/DL
MCH RBC QN AUTO: 24.8 PG (ref 26.5–33)
MCHC RBC AUTO-ENTMCNC: 32.2 G/DL (ref 31.5–36.5)
MCV RBC AUTO: 77 FL (ref 78–100)
NONHDLC SERPL-MCNC: 121 MG/DL
PLATELET # BLD AUTO: 237 10E3/UL (ref 150–450)
POTASSIUM SERPL-SCNC: 4.4 MMOL/L (ref 3.4–5.3)
PROT SERPL-MCNC: 7.3 G/DL (ref 6.4–8.3)
RBC # BLD AUTO: 4.76 10E6/UL (ref 3.8–5.2)
SODIUM SERPL-SCNC: 138 MMOL/L (ref 135–145)
TRIGL SERPL-MCNC: 256 MG/DL
TSH SERPL DL<=0.005 MIU/L-ACNC: 0.98 UIU/ML (ref 0.3–4.2)
WBC # BLD AUTO: 5.4 10E3/UL (ref 4–11)

## 2023-12-05 PROCEDURE — 90471 IMMUNIZATION ADMIN: CPT | Performed by: STUDENT IN AN ORGANIZED HEALTH CARE EDUCATION/TRAINING PROGRAM

## 2023-12-05 PROCEDURE — 90662 IIV NO PRSV INCREASED AG IM: CPT | Performed by: STUDENT IN AN ORGANIZED HEALTH CARE EDUCATION/TRAINING PROGRAM

## 2023-12-05 PROCEDURE — 80061 LIPID PANEL: CPT | Performed by: STUDENT IN AN ORGANIZED HEALTH CARE EDUCATION/TRAINING PROGRAM

## 2023-12-05 PROCEDURE — 85027 COMPLETE CBC AUTOMATED: CPT | Performed by: STUDENT IN AN ORGANIZED HEALTH CARE EDUCATION/TRAINING PROGRAM

## 2023-12-05 PROCEDURE — 82728 ASSAY OF FERRITIN: CPT | Performed by: STUDENT IN AN ORGANIZED HEALTH CARE EDUCATION/TRAINING PROGRAM

## 2023-12-05 PROCEDURE — 84443 ASSAY THYROID STIM HORMONE: CPT | Performed by: STUDENT IN AN ORGANIZED HEALTH CARE EDUCATION/TRAINING PROGRAM

## 2023-12-05 PROCEDURE — 36415 COLL VENOUS BLD VENIPUNCTURE: CPT | Performed by: STUDENT IN AN ORGANIZED HEALTH CARE EDUCATION/TRAINING PROGRAM

## 2023-12-05 PROCEDURE — 83036 HEMOGLOBIN GLYCOSYLATED A1C: CPT | Performed by: STUDENT IN AN ORGANIZED HEALTH CARE EDUCATION/TRAINING PROGRAM

## 2023-12-05 PROCEDURE — 99214 OFFICE O/P EST MOD 30 MIN: CPT | Mod: 25 | Performed by: STUDENT IN AN ORGANIZED HEALTH CARE EDUCATION/TRAINING PROGRAM

## 2023-12-05 PROCEDURE — 80053 COMPREHEN METABOLIC PANEL: CPT | Performed by: STUDENT IN AN ORGANIZED HEALTH CARE EDUCATION/TRAINING PROGRAM

## 2023-12-05 RX ORDER — OXYCODONE HYDROCHLORIDE 5 MG/1
5-10 TABLET ORAL EVERY 6 HOURS PRN
Qty: 45 TABLET | Refills: 0 | Status: SHIPPED | OUTPATIENT
Start: 2023-12-05 | End: 2024-08-12

## 2023-12-05 ASSESSMENT — PAIN SCALES - GENERAL: PAINLEVEL: NO PAIN (0)

## 2023-12-05 ASSESSMENT — PATIENT HEALTH QUESTIONNAIRE - PHQ9
10. IF YOU CHECKED OFF ANY PROBLEMS, HOW DIFFICULT HAVE THESE PROBLEMS MADE IT FOR YOU TO DO YOUR WORK, TAKE CARE OF THINGS AT HOME, OR GET ALONG WITH OTHER PEOPLE: NOT DIFFICULT AT ALL
SUM OF ALL RESPONSES TO PHQ QUESTIONS 1-9: 3
SUM OF ALL RESPONSES TO PHQ QUESTIONS 1-9: 3

## 2023-12-05 NOTE — LETTER
"December 7, 2023      Joslyn Sanchez  [address]  [place] [address]        Dear Joslyn,     Here are your recent lab results:      Your cholesterol levels look great. Your total cholesterol was normal. Your LDL (\"bad cholesterol\") was normal. Your HDL (\"good cholesterol\", helps protect your heart) was normal.      Your thyroid function was normal.      Your metabolic panel (kidney function, electrolytes, liver enzymes) was normal.      Your hemoglobin, MCV (size of red blood cells), and ferritin (iron stores) are all slightly low. If the throat symptoms don't improve I recommend getting the upper endoscopy to see if there is something in your esophagus or stomach causing slow microscopic blood/iron loss.      I also recommend starting ferrous sulfate 325 mg daily (this is over the counter in the vitamins aisle at the pharmacy or on [employer] or [employer]; [employer] is a good brand). It can cause constipation so make sure to drink plenty of water and eat lots of fiber.        Please let us know if you have questions or concerns.      Lea Linder MD   Internal Medicine & Pediatrics   [facility]     Resulted Orders   TSH with free T4 reflex   Result Value Ref Range    TSH 0.98 0.30 - 4.20 uIU/mL   CBC with platelets   Result Value Ref Range    WBC Count 5.4 4.0 - 11.0 10e3/uL    RBC Count 4.76 3.80 - 5.20 10e6/uL    Hemoglobin 11.8 11.7 - 15.7 g/dL    Hematocrit 36.7 35.0 - 47.0 %    MCV 77 (L) 78 - 100 fL    MCH 24.8 (L) 26.5 - 33.0 pg    MCHC 32.2 31.5 - 36.5 g/dL    RDW 14.9 10.0 - 15.0 %    Platelet Count 237 150 - 450 10e3/uL   Comprehensive metabolic panel (BMP + Alb, Alk Phos, ALT, AST, Total. Bili, TP)   Result Value Ref Range    Sodium 138 135 - 145 mmol/L      Comment:      Reference intervals for this test were updated on 09/26/2023 to more accurately reflect our healthy population. There may be differences in the flagging of prior results with similar values performed " with this method. Interpretation of those prior results can be made in the context of the updated reference intervals.     Potassium 4.4 3.4 - 5.3 mmol/L    Carbon Dioxide (CO2) 26 22 - 29 mmol/L    Anion Gap 10 7 - 15 mmol/L    Urea Nitrogen 18.6 8.0 - 23.0 mg/dL    Creatinine 0.95 0.51 - 0.95 mg/dL    GFR Estimate 66 >60 mL/min/1.73m2    Calcium 9.6 8.8 - 10.2 mg/dL    Chloride 102 98 - 107 mmol/L    Glucose 94 70 - 99 mg/dL    Alkaline Phosphatase 83 40 - 150 U/L      Comment:      Reference intervals for this test were updated on 11/14/2023 to more accurately reflect our healthy population. There may be differences in the flagging of prior results with similar values performed with this method. Interpretation of those prior results can be made in the context of the updated reference intervals.    AST 25 0 - 45 U/L      Comment:      Reference intervals for this test were updated on 6/12/2023 to more accurately reflect our healthy population. There may be differences in the flagging of prior results with similar values performed with this method. Interpretation of those prior results can be made in the context of the updated reference intervals.    ALT 19 0 - 50 U/L      Comment:      Reference intervals for this test were updated on 6/12/2023 to more accurately reflect our healthy population. There may be differences in the flagging of prior results with similar values performed with this method. Interpretation of those prior results can be made in the context of the updated reference intervals.      Protein Total 7.3 6.4 - 8.3 g/dL    Albumin 4.7 3.5 - 5.2 g/dL    Bilirubin Total 0.9 <=1.2 mg/dL   Lipid panel reflex to direct LDL Non-fasting   Result Value Ref Range    Cholesterol 177 <200 mg/dL    Triglycerides 256 (H) <150 mg/dL    Direct Measure HDL 56 >=50 mg/dL    LDL Cholesterol Calculated 70 <=100 mg/dL    Non HDL Cholesterol 121 <130 mg/dL    Patient Fasting > 8hrs? No     Narrative     Cholesterol  Desirable:  <200 mg/dL    Triglycerides  Normal:  Less than 150 mg/dL  Borderline High:  150-199 mg/dL  High:  200-499 mg/dL  Very High:  Greater than or equal to 500 mg/dL    Direct Measure HDL  Female:  Greater than or equal to 50 mg/dL   Male:  Greater than or equal to 40 mg/dL    LDL Cholesterol  Desirable:  <100mg/dL  Above Desirable:  100-129 mg/dL   Borderline High:  130-159 mg/dL   High:  160-189 mg/dL   Very High:  >= 190 mg/dL    Non HDL Cholesterol  Desirable:  130 mg/dL  Above Desirable:  130-159 mg/dL  Borderline High:  160-189 mg/dL  High:  190-219 mg/dL  Very High:  Greater than or equal to 220 mg/dL   Ferritin   Result Value Ref Range    Ferritin 16 11 - 328 ng/mL   Hemoglobin A1c   Result Value Ref Range    Hemoglobin A1C 5.3 0.0 - 5.6 %      Comment:      Normal <5.7%   Prediabetes 5.7-6.4%    Diabetes 6.5% or higher     Note: Adopted from ADA consensus guidelines.

## 2023-12-05 NOTE — PROGRESS NOTES
"  Assessment & Plan     Globus sensation  Recommend trial Flonase (fluticasone) and Zyrtec (cetirizine). If not improving recommend upper endoscopy. Did PPI previously.  - Adult GI  Referral - Procedure Only; Future  - TSH with free T4 reflex; Future  - CBC with platelets; Future  - Comprehensive metabolic panel (BMP + Alb, Alk Phos, ALT, AST, Total. Bili, TP); Future  - Ferritin; Future  - TSH with free T4 reflex  - CBC with platelets  - Comprehensive metabolic panel (BMP + Alb, Alk Phos, ALT, AST, Total. Bili, TP)  - Ferritin    Hyperlipidemia LDL goal <130  On rosuvastatin.  - Lipid panel reflex to direct LDL Non-fasting; Future  - Lipid panel reflex to direct LDL Non-fasting    Other migraine without status migrainosus, not intractable  Uses maxalt which helps. Uses opioid when intractable. Prescription lasts a year.  - oxyCODONE (ROXICODONE) 5 MG tablet; Take 1-2 tablets (5-10 mg) by mouth every 6 hours as needed (intractable migraine)    Screening for diabetes mellitus  - Hemoglobin A1c; Future  - Hemoglobin A1c             BMI:   Estimated body mass index is 28.54 kg/m  as calculated from the following:    Height as of 2/20/23: 1.71 m (5' 7.32\").    Weight as of 6/16/23: 83.5 kg (184 lb).           Lea Machado MD  [facility] [place] Jorgensen is a 63 year old, presenting for the following health issues:  Gastrointestinal Problem        12/5/2023     3:04 PM   Additional Questions   Roomed by [facility]   Accompanied by SELF         12/5/2023     3:04 PM   Patient Reported Additional Medications   Patient reports taking the following new medications NONE       History of Present Illness       Headaches:   Since the patient's last clinic visit, headaches are: worsened  The patient is getting headaches:  Weekly  She is not able to do normal daily activities when she has a migraine.  The patient is taking the following rescue/relief medications:  Maxalt   Patient states \"I get " "some relief\" from the rescue/relief medications.   The patient is taking the following medications to prevent migraines:  No medications to prevent migraines  In the past 4 weeks, the patient has gone to an Urgent Care or Emergency Room 1 time times due to headaches.    She eats 2-3 servings of fruits and vegetables daily.She consumes 1 sweetened beverage(s) daily.She exercises with enough effort to increase her heart rate 10 to 19 minutes per day.  She exercises with enough effort to increase her heart rate 3 or less days per week.   She is taking medications regularly.         GERD/Heartburn - mostly fullness in neck/throat  Onset/Duration: ongoing for many months was seen for this on 07/12/2023  No history of smoking or excessive alcohol use or NSAID use  Description: feeling of \"fullness\" in throat and nausea   Intensity: mild, moderate  Progression of Symptoms: improving and intermittent  Accompanying Signs & Symptoms:  Does it feel like food gets stuck or trouble swallowing: YES  Nausea: YES  Vomiting (bloody?): YES- this winter- no blood   Abdominal Pain: No  Black-Tarry stools: No  Bloody stools: No  History:  Previous similar episodes: YES  Previous ulcers: many years ago   Precipitating factors:   Caffeine use: YES  Alcohol use: No  NSAID/Aspirin use: No  Tobacco use: No  Worse with no particular food, could be anxiety.  Alleviating factors: None  Therapies tried and outcome:             Lifestyle changes: None            Medications: esomeprazole- stopped taking since it did not help. Zofran for migraines          Objective    /62 (BP Location: Right arm, Patient Position: Sitting, Cuff Size: Adult Large)   Pulse 65   Temp 98.3  F (36.8  C) (Tympanic)   Resp 16   LMP 02/09/2014   SpO2 97%   There is no height or weight on file to calculate BMI.  Physical Exam   GENERAL: healthy, alert and no distress  EYES: Eyes grossly normal to inspection, and conjunctivae and sclerae normal  HENT: ear canals " and TM's normal, nose and mouth without ulcers or lesions  NECK: no adenopathy, no asymmetry, masses, or scars and thyroid normal to palpation  RESP: lungs clear to auscultation - no rales, rhonchi or wheezes  CV: regular rate and rhythm, normal S1 S2, no S3 or S4, no murmur, click or rub, no peripheral edema

## 2023-12-05 NOTE — PATIENT INSTRUCTIONS
So nice to see you!      Try Flonase (fluticasone) nasal spray and Zyrtec (cetirizine) 10mg daily for 6 weeks     If doesn't help get the upper endoscopy

## 2023-12-31 DIAGNOSIS — F41.9 ANXIETY: ICD-10-CM

## 2024-01-02 RX ORDER — CITALOPRAM HYDROBROMIDE 20 MG/1
30 TABLET ORAL DAILY
Qty: 45 TABLET | Refills: 1 | Status: SHIPPED | OUTPATIENT
Start: 2024-01-02 | End: 2024-02-02

## 2024-01-11 DIAGNOSIS — G43.809 OTHER MIGRAINE WITHOUT STATUS MIGRAINOSUS, NOT INTRACTABLE: ICD-10-CM

## 2024-01-11 RX ORDER — RIZATRIPTAN BENZOATE 10 MG/1
TABLET ORAL
Qty: 12 TABLET | Refills: 11 | Status: SHIPPED | OUTPATIENT
Start: 2024-01-11

## 2024-01-12 ENCOUNTER — TRANSFERRED RECORDS (OUTPATIENT)
Dept: HEALTH INFORMATION MANAGEMENT | Facility: CLINIC | Age: 64
End: 2024-01-12
Payer: MEDICARE

## 2024-02-02 DIAGNOSIS — F41.9 ANXIETY: ICD-10-CM

## 2024-02-02 RX ORDER — CITALOPRAM HYDROBROMIDE 20 MG/1
30 TABLET ORAL DAILY
Qty: 45 TABLET | Refills: 0 | Status: SHIPPED | OUTPATIENT
Start: 2024-02-02 | End: 2024-02-28

## 2024-02-06 DIAGNOSIS — E78.5 HYPERLIPIDEMIA LDL GOAL <130: ICD-10-CM

## 2024-02-06 NOTE — TELEPHONE ENCOUNTER
Medication Question or Refill        What medication are you calling about (include dose and sig)?: rosuvastatin (CRESTOR) 10 MG tablet     Preferred Pharmacy:     [facility]/pharmacy #[id number] - [place], [place] - [address] AT [place] [address]  [place] [address]  Phone: [phone] Fax: [phone]      Controlled Substance Agreement on file:   CSA -- Patient Level:    CSA: None found at the patient level.       Who prescribed the medication?: PCP    Do you need a refill? Yes    When did you use the medication last? 2/6/2024    Patient offered an appointment? No    Do you have any questions or concerns?  Yes: Pt does not understand why the RX is only for 30 days, which is making the refill request challenging.  Anyway we can get an updated order that would refill for the year or even 3/6 months as this has become very burdening for the pt.      Could we send this information to you in [employer] or would you prefer to receive a phone call?:   Patient would like to be contacted via [employer]

## 2024-02-06 NOTE — TELEPHONE ENCOUNTER
Refill request for rosuvastatin received from [facility] on [address] in [place]. Refill faxed back to pharmacy with directions to send to patient's PCP. Patient is following up with Cardiology as needed.

## 2024-02-07 ENCOUNTER — HOSPITAL ENCOUNTER (OUTPATIENT)
Facility: CLINIC | Age: 64
End: 2024-02-07
Attending: INTERNAL MEDICINE | Admitting: INTERNAL MEDICINE
Payer: COMMERCIAL

## 2024-02-07 ENCOUNTER — TELEPHONE (OUTPATIENT)
Dept: GASTROENTEROLOGY | Facility: CLINIC | Age: 64
End: 2024-02-07
Payer: MEDICARE

## 2024-02-07 RX ORDER — ROSUVASTATIN CALCIUM 10 MG/1
10 TABLET, COATED ORAL AT BEDTIME
Qty: 90 TABLET | Refills: 0 | Status: SHIPPED | OUTPATIENT
Start: 2024-02-07 | End: 2024-05-06

## 2024-02-27 ENCOUNTER — ANCILLARY PROCEDURE (OUTPATIENT)
Dept: MAMMOGRAPHY | Facility: CLINIC | Age: 64
End: 2024-02-27
Attending: STUDENT IN AN ORGANIZED HEALTH CARE EDUCATION/TRAINING PROGRAM
Payer: COMMERCIAL

## 2024-02-27 DIAGNOSIS — Z12.31 VISIT FOR SCREENING MAMMOGRAM: ICD-10-CM

## 2024-02-27 PROCEDURE — 77067 SCR MAMMO BI INCL CAD: CPT | Mod: TC | Performed by: RADIOLOGY

## 2024-02-28 DIAGNOSIS — F41.9 ANXIETY: ICD-10-CM

## 2024-02-28 RX ORDER — CITALOPRAM HYDROBROMIDE 20 MG/1
30 TABLET ORAL DAILY
Qty: 135 TABLET | Refills: 0 | Status: SHIPPED | OUTPATIENT
Start: 2024-02-28 | End: 2024-05-06

## 2024-03-04 ENCOUNTER — TRANSFERRED RECORDS (OUTPATIENT)
Dept: HEALTH INFORMATION MANAGEMENT | Facility: CLINIC | Age: 64
End: 2024-03-04
Payer: MEDICARE

## 2024-03-08 ENCOUNTER — MYC MEDICAL ADVICE (OUTPATIENT)
Dept: PEDIATRICS | Facility: CLINIC | Age: 64
End: 2024-03-08
Payer: MEDICARE

## 2024-03-08 DIAGNOSIS — R09.A2 GLOBUS SENSATION: Primary | ICD-10-CM

## 2024-03-11 ENCOUNTER — TELEPHONE (OUTPATIENT)
Dept: GASTROENTEROLOGY | Facility: CLINIC | Age: 64
End: 2024-03-11
Payer: MEDICARE

## 2024-03-11 NOTE — TELEPHONE ENCOUNTER
Caller: patient    Reason for Reschedule/Cancellation   (please be detailed, any staff messages or encounters to note?): appt date to far-went with external provider      Prior to reschedule please review:  Ordering Provider: VARUN MAZARIEGOS   Sedation Determined: moderate S  Does patient have any ASC Exclusions, please identify?: Y sleep apnea      Notes on Cancelled Procedure:  Procedure: Lower Endoscopy [Colonoscopy]   Date: 4/30  Location: [facility]; [address] [place] [address]  Surgeon: roselyn      Rescheduled: No,

## 2024-05-06 DIAGNOSIS — E78.5 HYPERLIPIDEMIA LDL GOAL <130: ICD-10-CM

## 2024-05-06 DIAGNOSIS — F41.9 ANXIETY: ICD-10-CM

## 2024-05-06 NOTE — TELEPHONE ENCOUNTER
Pt calls.    She is wondering why she is not getting her medications.      See ov of 12/5/23 - pt was to have a physical around 3/5/24.      She said she sent a [employer] message last week and didn't get a response back.  Advised that there are not any messages in her chart since 3/26/24 and we did respond back.  Advised I am sorry, but I don't see anything since then.      She is looking for a refill for crestor - Dr. Arriola had been prescribing.  She said she does not see him anymore.      She is asking for maxalt.  Advised she should have refills on that - see refill of 1/11/24.      She is also looking for celexa.    She wanted a refill on zofran.  Looks like it was last prescribed by Dr. Cronin in .  Advised she would need some kind of appt for this.      Will forward to the refill pool.    Advised she could schedule an appt on mychart.  She does not want to do this.  Transferred her to central scheduling to help schedule a physical.

## 2024-05-07 RX ORDER — ROSUVASTATIN CALCIUM 10 MG/1
10 TABLET, COATED ORAL AT BEDTIME
Qty: 90 TABLET | Refills: 0 | Status: SHIPPED | OUTPATIENT
Start: 2024-05-07 | End: 2024-07-25

## 2024-05-07 RX ORDER — CITALOPRAM HYDROBROMIDE 20 MG/1
30 TABLET ORAL DAILY
Qty: 135 TABLET | Refills: 0 | Status: SHIPPED | OUTPATIENT
Start: 2024-05-07 | End: 2024-07-10

## 2024-05-25 ENCOUNTER — HEALTH MAINTENANCE LETTER (OUTPATIENT)
Age: 64
End: 2024-05-25

## 2024-07-09 ENCOUNTER — OFFICE VISIT (OUTPATIENT)
Dept: PEDIATRICS | Facility: CLINIC | Age: 64
End: 2024-07-09
Payer: MEDICARE

## 2024-07-09 VITALS
RESPIRATION RATE: 18 BRPM | HEIGHT: 67 IN | DIASTOLIC BLOOD PRESSURE: 72 MMHG | OXYGEN SATURATION: 99 % | SYSTOLIC BLOOD PRESSURE: 112 MMHG | TEMPERATURE: 97 F | HEART RATE: 63 BPM | BODY MASS INDEX: 28.85 KG/M2 | WEIGHT: 183.8 LBS

## 2024-07-09 DIAGNOSIS — F32.0 MAJOR DEPRESSIVE DISORDER, SINGLE EPISODE, MILD (H): ICD-10-CM

## 2024-07-09 DIAGNOSIS — Z13.820 SCREENING FOR OSTEOPOROSIS: ICD-10-CM

## 2024-07-09 DIAGNOSIS — K51.90 ULCERATIVE COLITIS WITHOUT COMPLICATIONS, UNSPECIFIED LOCATION (H): ICD-10-CM

## 2024-07-09 DIAGNOSIS — Z00.00 ENCOUNTER FOR MEDICARE ANNUAL WELLNESS EXAM: Primary | ICD-10-CM

## 2024-07-09 DIAGNOSIS — Z78.0 ASYMPTOMATIC MENOPAUSAL STATE: ICD-10-CM

## 2024-07-09 DIAGNOSIS — G43.809 OTHER MIGRAINE WITHOUT STATUS MIGRAINOSUS, NOT INTRACTABLE: ICD-10-CM

## 2024-07-09 PROCEDURE — 90677 PCV20 VACCINE IM: CPT | Performed by: STUDENT IN AN ORGANIZED HEALTH CARE EDUCATION/TRAINING PROGRAM

## 2024-07-09 PROCEDURE — G0438 PPPS, INITIAL VISIT: HCPCS | Performed by: STUDENT IN AN ORGANIZED HEALTH CARE EDUCATION/TRAINING PROGRAM

## 2024-07-09 PROCEDURE — G0009 ADMIN PNEUMOCOCCAL VACCINE: HCPCS | Performed by: STUDENT IN AN ORGANIZED HEALTH CARE EDUCATION/TRAINING PROGRAM

## 2024-07-09 PROCEDURE — 99213 OFFICE O/P EST LOW 20 MIN: CPT | Mod: 25 | Performed by: STUDENT IN AN ORGANIZED HEALTH CARE EDUCATION/TRAINING PROGRAM

## 2024-07-09 RX ORDER — OMEPRAZOLE 20 MG/1
20 TABLET, DELAYED RELEASE ORAL DAILY
COMMUNITY

## 2024-07-09 RX ORDER — PROCHLORPERAZINE MALEATE 5 MG
5 TABLET ORAL EVERY 6 HOURS PRN
Qty: 30 TABLET | Refills: 1 | Status: SHIPPED | OUTPATIENT
Start: 2024-07-09

## 2024-07-09 RX ORDER — IPRATROPIUM BROMIDE 21 UG/1
1 SPRAY, METERED NASAL
COMMUNITY
Start: 2024-06-17

## 2024-07-09 SDOH — HEALTH STABILITY: PHYSICAL HEALTH: ON AVERAGE, HOW MANY DAYS PER WEEK DO YOU ENGAGE IN MODERATE TO STRENUOUS EXERCISE (LIKE A BRISK WALK)?: 4 DAYS

## 2024-07-09 ASSESSMENT — SOCIAL DETERMINANTS OF HEALTH (SDOH): HOW OFTEN DO YOU GET TOGETHER WITH FRIENDS OR RELATIVES?: ONCE A WEEK

## 2024-07-09 ASSESSMENT — PATIENT HEALTH QUESTIONNAIRE - PHQ9
10. IF YOU CHECKED OFF ANY PROBLEMS, HOW DIFFICULT HAVE THESE PROBLEMS MADE IT FOR YOU TO DO YOUR WORK, TAKE CARE OF THINGS AT HOME, OR GET ALONG WITH OTHER PEOPLE: NOT DIFFICULT AT ALL
SUM OF ALL RESPONSES TO PHQ QUESTIONS 1-9: 5
SUM OF ALL RESPONSES TO PHQ QUESTIONS 1-9: 5

## 2024-07-09 ASSESSMENT — PAIN SCALES - GENERAL: PAINLEVEL: NO PAIN (0)

## 2024-07-09 NOTE — PATIENT INSTRUCTIONS
So nice to see you!    Can try Novaferrum iron supplement every other day      Patient Education   Preventive Care Advice   This is general advice given by our system to help you stay healthy. However, your care team may have specific advice just for you. Please talk to your care team about your preventive care needs.  Nutrition  Eat 5 or more servings of fruits and vegetables each day.  Try wheat bread, brown rice and whole grain pasta (instead of white bread, rice, and pasta).  Get enough calcium and vitamin D. Check the label on foods and aim for 100% of the RDA (recommended daily allowance).  Lifestyle  Exercise at least 150 minutes each week  (30 minutes a day, 5 days a week).  Do muscle strengthening activities 2 days a week. These help control your weight and prevent disease.  No smoking.  Wear sunscreen to prevent skin cancer.  Have a dental exam and cleaning every 6 months.  Yearly exams  See your health care team every year to talk about:  Any changes in your health.  Any medicines your care team has prescribed.  Preventive care, family planning, and ways to prevent chronic diseases.  Shots (vaccines)   HPV shots (up to age 26), if you've never had them before.  Hepatitis B shots (up to age 59), if you've never had them before.  COVID-19 shot: Get this shot when it's due.  Flu shot: Get a flu shot every year.  Tetanus shot: Get a tetanus shot every 10 years.  Pneumococcal, hepatitis A, and RSV shots: Ask your care team if you need these based on your risk.  Shingles shot (for age 50 and up)  General health tests  Diabetes screening:  Starting at age 35, Get screened for diabetes at least every 3 years.  If you are younger than age 35, ask your care team if you should be screened for diabetes.  Cholesterol test: At age 39, start having a cholesterol test every 5 years, or more often if advised.  Bone density scan (DEXA): At age 50, ask your care team if you should have this scan for osteoporosis (brittle  bones).  Hepatitis C: Get tested at least once in your life.  STIs (sexually transmitted infections)  Before age 24: Ask your care team if you should be screened for STIs.  After age 24: Get screened for STIs if you're at risk. You are at risk for STIs (including HIV) if:  You are sexually active with more than one person.  You don't use condoms every time.  You or a partner was diagnosed with a sexually transmitted infection.  If you are at risk for HIV, ask about PrEP medicine to prevent HIV.  Get tested for HIV at least once in your life, whether you are at risk for HIV or not.  Cancer screening tests  Cervical cancer screening: If you have a cervix, begin getting regular cervical cancer screening tests starting at age 21.  Breast cancer scan (mammogram): If you've ever had breasts, begin having regular mammograms starting at age 40. This is a scan to check for breast cancer.  Colon cancer screening: It is important to start screening for colon cancer at age 45.  Have a colonoscopy test every 10 years (or more often if you're at risk) Or, ask your provider about stool tests like a FIT test every year or Cologuard test every 3 years.  To learn more about your testing options, visit:   .  For help making a decision, visit:   https://bit.ly/xk22387.  Prostate cancer screening test: If you have a prostate, ask your care team if a prostate cancer screening test (PSA) at age 55 is right for you.  Lung cancer screening: If you are a current or former smoker ages 50 to 80, ask your care team if ongoing lung cancer screenings are right for you.  For informational purposes only. Not to replace the advice of your health care provider. Copyright   2023 [employer]. All rights reserved. Clinically reviewed by the [facility]. [employer] 233292 - REV 01/24.  Learning About Depression Screening  What is depression screening?  Depression screening is a way to see if you have depression  "symptoms. It may be done by a doctor or counselor. It's often part of a routine checkup. That's because your mental health is just as important as your physical health.  Depression is a mental health condition that affects how you feel, think, and act. You may:  Have less energy.  Lose interest in your daily activities.  Feel sad and grouchy for a long time.  Depression is very common. It affects people of all ages.  Many things can lead to depression. Some people become depressed after they have a stroke or find out they have a major illness like cancer or heart disease. The death of a loved one or a breakup may lead to depression. It can run in families. Most experts believe that a combination of inherited genes and stressful life events can cause it.  What happens during screening?  You may be asked to fill out a form about your depression symptoms. You and the doctor will discuss your answers. The doctor may ask you more questions to learn more about how you think, act, and feel.  What happens after screening?  If you have symptoms of depression, your doctor will talk to you about your options.  Doctors usually treat depression with medicines or counseling. Often, combining the two works best. Many people don't get help because they think that they'll get over the depression on their own. But people with depression may not get better unless they get treatment.  The cause of depression is not well understood. There may be many factors involved. But if you have depression, it's not your fault.  A serious symptom of depression is thinking about death or suicide. If you or someone you care about talks about this or about feeling hopeless, get help right away.  It's important to know that depression can be treated. Medicine, counseling, and self-care may help.  Where can you learn more?  Go to https://www.healthwise.net/patiented  Enter T185 in the search box to learn more about \"Learning About Depression " "Screening.\"  Current as of: June 24, 2023               Content Version: 14.0    [id number] [employer].   Care instructions adapted under license by your healthcare professional. If you have questions about a medical condition or this instruction, always ask your healthcare professional. [employer] disclaims any warranty or liability for your use of this information.      Chronic Pain: Care Instructions  Your Care Instructions     Chronic pain is pain that lasts a long time (months or even years) and may or may not have a clear cause. It is different from acute pain, which usually does have a clear cause--like an injury or illness--and gets better over time. Chronic pain:  Lasts over time but may vary from day to day.  Does not go away despite efforts to end it.  May disrupt your sleep and lead to fatigue.  May cause depression or anxiety.  May make your muscles tense, causing more pain.  Can disrupt your work, hobbies, home life, and relationships with friends and family.  Chronic pain is a very real condition. It is not just in your head. Treatment can help and usually includes several methods used together, such as medicines, physical therapy, exercise, and other treatments. Learning how to relax and changing negative thought patterns can also help you cope.  Chronic pain is complex. Taking an active role in your treatment will help you better manage your pain. Tell your doctor if you have trouble dealing with your pain. You may have to try several things before you find what works best for you.  Follow-up care is a key part of your treatment and safety. Be sure to make and go to all appointments, and call your doctor if you are having problems. It's also a good idea to know your test results and keep a list of the medicines you take.  How can you care for yourself at home?  Pace yourself. Break up large jobs into smaller tasks. Save harder tasks for days when you have less pain, or go " back and forth between hard tasks and easier ones. Take rest breaks.  Relax, and reduce stress. Relaxation techniques such as deep breathing or meditation can help.  Keep moving. Gentle, daily exercise can help reduce pain over the long run. Try low- or no-impact exercises such as walking, swimming, and stationary biking. Do stretches to stay flexible.  Try heat, cold packs, and massage.  Get enough sleep. Chronic pain can make you tired and drain your energy. Talk with your doctor if you have trouble sleeping because of pain.  Think positive. Your thoughts can affect your pain level. Do things that you enjoy to distract yourself when you have pain instead of focusing on the pain. See a movie, read a book, listen to music, or spend time with a friend.  If you think you are depressed, talk to your doctor about treatment.  Keep a daily pain diary. Record how your moods, thoughts, sleep patterns, activities, and medicine affect your pain. You may find that your pain is worse during or after certain activities or when you are feeling a certain emotion. Having a record of your pain can help you and your doctor find the best ways to treat your pain.  Take pain medicines exactly as directed.  If the doctor gave you a prescription medicine for pain, take it as prescribed.  If you are not taking a prescription pain medicine, ask your doctor if you can take an over-the-counter medicine.  Reducing constipation caused by pain medicine  Talk to your doctor about a laxative. If a laxative doesn't work, your doctor may suggest a prescription medicine.  Include fruits, vegetables, beans, and whole grains in your diet each day. These foods are high in fiber.  If your doctor recommends it, get more exercise. Walking is a good choice. Bit by bit, increase the amount you walk every day. Try for at least 30 minutes on most days of the week.  Schedule time each day for a bowel movement. A daily routine may help. Take your time and do not  "strain when having a bowel movement.  When should you call for help?   Call your doctor now or seek immediate medical care if:    Your pain gets worse or is out of control.     You feel down or blue, or you do not enjoy things like you once did. You may be depressed, which is common in people with chronic pain. Depression can be treated.     You have vomiting or cramps for more than 2 hours.   Watch closely for changes in your health, and be sure to contact your doctor if:    You cannot sleep because of pain.     You are very worried or anxious about your pain.     You have trouble taking your pain medicine.     You have any concerns about your pain medicine.     You have trouble with bowel movements, such as:  No bowel movement in 3 days.  Blood in the anal area, in your stool, or on the toilet paper.  Diarrhea for more than 24 hours.   Where can you learn more?  Go to https://www.[employer].net/patiented  Enter N004 in the search box to learn more about \"Chronic Pain: Care Instructions.\"  Current as of: July 10, 2023               Content Version: 14.0    1776-2014 [employer].   Care instructions adapted under license by your healthcare professional. If you have questions about a medical condition or this instruction, always ask your healthcare professional. [employer] disclaims any warranty or liability for your use of this information.         "

## 2024-07-09 NOTE — PROGRESS NOTES
"Preventive Care Visit  [facility] Deirdre E. Milligan, MD, Internal Medicine - Pediatrics  Jul 9, 2024      Assessment & Plan     Encounter for Medicare annual wellness exam    Other migraine without status migrainosus, not intractable  Will trial compazine instead of zofran (ondansetron) as reports was more effective.  - prochlorperazine (COMPAZINE) 5 MG tablet; Take 1 tablet (5 mg) by mouth every 6 hours as needed for nausea or vomiting    Major depressive disorder, single episode, mild (H24)  Discussed celexa and possible side effects of increased sweating. Will trial decrease from 30mg to 20mg and if increase in anxiety or depression symptoms will increase back to 30mg. Could trial cross taper but selective serotonin reuptake inhibitor or SNRI may have similar side effects.    Ulcerative colitis without complications, unspecified location (H)  Gets colonoscopy every 2 years.    Screening for osteoporosis  - DEXA HIP/PELVIS/SPINE - Future; Future    Asymptomatic menopausal state  - DEXA HIP/PELVIS/SPINE - Future; Future            BMI  Estimated body mass index is 28.57 kg/m  as calculated from the following:    Height as of this encounter: 1.708 m (5' 7.25\").    Weight as of this encounter: 83.4 kg (183 lb 12.8 oz).       Counseling  Appropriate preventive services were discussed with this patient, including applicable screening as appropriate for fall prevention, nutrition, physical activity, Tobacco-use cessation, weight loss and cognition.  Checklist reviewing preventive services available has been given to the patient.  Reviewed patient's diet, addressing concerns and/or questions.   The patient's PHQ-9 score is consistent with mild depression. She was provided with information regarding depression.   I have reviewed Opioid Use Disorder and Substance Use Disorder risk factors and made any needed referrals.           Herbert Jorgensen is a 64 year old, presenting for the " following:  Medicare Visit        7/9/2024    10:11 AM   Additional Questions   Roomed by Evelyne   Accompanied by none         7/9/2024    10:11 AM   Patient Reported Additional Medications   Patient reports taking the following new medications none        Health Care Directive  Patient does not have a Health Care Directive or Living Will: Patient states has Advance Directive and will bring in a copy to clinic.    HPI    Sweating excessively  -has been on celexa for years aand sometimes have trouble sleeping      Globus  -atrovent and Zyrtec (cetirizine) helps nasal drip  -EGD showed erosions    Donates blood  -has not been taking iron supplement            7/9/2024   General Health   How would you rate your overall physical health? Good   Feel stress (tense, anxious, or unable to sleep) To some extent      (!) STRESS CONCERN      7/9/2024   Nutrition   Diet: Low fat/cholesterol            7/9/2024   Exercise   Days per week of moderate/strenous exercise 4 days            7/9/2024   Social Factors   Frequency of gathering with friends or relatives Once a week   Worry food won't last until get money to buy more No   Food not last or not have enough money for food? No   Do you have housing? (Housing is defined as stable permanent housing and does not include staying ouside in a car, in a tent, in an abandoned building, in an overnight shelter, or couch-surfing.) Yes   Are you worried about losing your housing? No   Lack of transportation? No   Unable to get utilities (heat,electricity)? No            7/9/2024   Fall Risk   Fallen 2 or more times in the past year? No   Trouble with walking or balance? No             7/9/2024   Activities of Daily Living- Home Safety   Needs help with the following daily activites None of the above   Safety concerns in the home None of the above            7/9/2024   Dental   Dentist two times every year? Yes            7/9/2024   Hearing Screening   Hearing concerns? None of the above             7/9/2024   Driving Risk Screening   Patient/family members have concerns about driving No            7/9/2024   General Alertness/Fatigue Screening   Have you been more tired than usual lately? No            7/9/2024   Urinary Incontinence Screening   Bothered by leaking urine in past 6 months No            7/9/2024   TB Screening   Were you born outside of the [place]? No          Today's PHQ-9 Score:       7/9/2024    10:08 AM   PHQ-9 SCORE   PHQ-9 Total Score MyChart 5 (Mild depression)   PHQ-9 Total Score 5         7/9/2024   Substance Use   Alcohol more than 3/day or more than 7/wk No   Do you have a current opioid prescription? (!) YES   How severe/bad is pain from 1 to 10? 8/10   Do you use any other substances recreationally? No             No data to display              Low Risk (0-3)  Moderate Risk (4-7)  High Risk (>8)  Social History     Tobacco Use    Smoking status: Former     Types: Cigarettes    Smokeless tobacco: Never    Tobacco comments:     Quit 28 years ago   Substance Use Topics    Alcohol use: Yes     Alcohol/week: 0.0 standard drinks of alcohol     Comment: socially     Drug use: No           11/10/2022   LAST FHS-7 RESULTS   1st degree relative breast or ovarian cancer No   Any relative bilateral breast cancer No   Any male have breast cancer No   Any ONE woman have BOTH breast AND ovarian cancer No   Any woman with breast cancer before 50yrs No   2 or more relatives with breast AND/OR ovarian cancer No   2 or more relatives with breast AND/OR bowel cancer No                 History of abnormal Pap smear: Status post hysterectomy with removal of cervix and no history of CIN2 or greater or cervical cancer. Health Maintenance and Surgical History updated.       ASCVD Risk   The 10-year ASCVD risk score (Dwayne BROWN, et al., 2019) is: 4.5%    Values used to calculate the score:      Age: 64 years      Sex: Female      Is Non- : No      Diabetic: No       "Tobacco smoker: No      Systolic Blood Pressure: 112 mmHg      Is BP treated: No      HDL Cholesterol: 56 mg/dL      Total Cholesterol: 177 mg/dL            Reviewed and updated as needed this visit by Provider                      Current providers sharing in care for this patient include:  Patient Care Team:  Milligan, Deirdre E, MD as PCP - General (Internal Medicine - Pediatrics)  Milligan, Deirdre E, MD as Assigned PCP  Liya Pardo NP as Assigned Heart and Vascular Provider  Kayy Parnell MD as MD (Otolaryngology)  Kayy Parnell MD as MD (Otolaryngology)    The following health maintenance items are reviewed in Epic and correct as of today:  Health Maintenance   Topic Date Due    DEXA  Never done    ANNUAL REVIEW OF HM ORDERS  Never done    RSV VACCINE (Pregnancy & 60+) (1 - 1-dose 60+ series) Never done    Pneumococcal Vaccine: 65+ Years (1 of 1 - PCV) Never done    COVID-19 Vaccine (4 - 2023-24 season) 09/01/2023    MEDICARE ANNUAL WELLNESS VISIT  02/20/2024    INFLUENZA VACCINE (1) 09/01/2024    CMP  12/05/2024    LIPID  12/05/2024    PHQ-9  01/09/2025    COLORECTAL CANCER SCREENING  04/11/2025    FALL RISK ASSESSMENT  07/09/2025    MAMMO SCREENING  02/27/2026    GLUCOSE  12/05/2026    ADVANCE CARE PLANNING  12/21/2026    DTAP/TDAP/TD IMMUNIZATION (3 - Td or Tdap) 02/20/2033    HEPATITIS C SCREENING  Completed    DEPRESSION ACTION PLAN  Completed    MIGRAINE ACTION PLAN  Completed    ZOSTER IMMUNIZATION  Completed    IPV IMMUNIZATION  Aged Out    HPV IMMUNIZATION  Aged Out    MENINGITIS IMMUNIZATION  Aged Out    RSV MONOCLONAL ANTIBODY  Aged Out    PAP  Discontinued    LUNG CANCER SCREENING  Discontinued            Objective    Exam  /72 (BP Location: Right arm, Patient Position: Sitting, Cuff Size: Adult Regular)   Pulse 63   Temp 97  F (36.1  C) (Tympanic)   Resp 18   Ht 1.708 m (5' 7.25\")   Wt 83.4 kg (183 lb 12.8 oz)   LMP 02/09/2014 (Exact Date)   SpO2 99%   " "BMI 28.57 kg/m     Estimated body mass index is 28.57 kg/m  as calculated from the following:    Height as of this encounter: 1.708 m (5' 7.25\").    Weight as of this encounter: 83.4 kg (183 lb 12.8 oz).    Physical Exam  GENERAL: alert and no distress  EYES: Eyes grossly normal to inspection, and conjunctivae and sclerae normal  HENT: ear canals and TM's normal, nose and mouth without ulcers or lesions  NECK: no adenopathy, no asymmetry, masses, or scars  RESP: lungs clear to auscultation - no rales, rhonchi or wheezes  CV: regular rate and rhythm, normal S1 S2, no S3 or S4, no murmur, click or rub, no peripheral edema  ABDOMEN: soft, nontender, no hepatosplenomegaly, no masses  MS: no gross musculoskeletal defects noted, no edema  SKIN: no suspicious lesions or rashes  NEURO: Normal strength and tone, mentation intact and speech normal  PSYCH: mentation appears normal, affect normal/bright        7/9/2024   Mini Cog   Clock Draw Score 2 Normal   3 Item Recall 3 objects recalled   Mini Cog Total Score 5            Vision Screen  Patient wears corrective lenses (select all that apply): Worn during vision screen  Vision Screen Results: Pass      Signed Electronically by: Deirdre E. Milligan, MD    "

## 2024-07-10 DIAGNOSIS — F41.9 ANXIETY: ICD-10-CM

## 2024-07-11 RX ORDER — CITALOPRAM HYDROBROMIDE 20 MG/1
30 TABLET ORAL DAILY
Qty: 135 TABLET | Refills: 4 | Status: SHIPPED | OUTPATIENT
Start: 2024-07-11

## 2024-07-22 NOTE — Clinical Note
Catheter removed over wire.
Catheter removed over wire.
Check Procedures.
Conscious sedation is planned for this procedure.    Provider immediate assessment completed. 
Family has been updated.
Hemodynamic equipment used: 5 lead ECG, LIKEPAK Hands Off Patches, Calometric ETCO2 device, Machine BP Cuff and pulse oximeter probe.
LCA Cine(s)  injected and visualized utilizing power injector system.
Lab results reviewed and abnormals discussed with physician.
Patient education provided. 
Patient education provided. 
Pre-calculated contrast dose 303 ml
Prepped: groin and right radial. Prepped with: ChloraPrep. The patient was draped. .Pre-procedure site marking:Insertion site not predetermined
Procedure is scheduled in [place] [address].
Procedure scheduled as Elective.
R-Band utilized for closure of site.  Manual pressure applied by scrub person; hemostasis achieved.
RCA Cine(s)  injected and visualized utilizing power injector system.
Removed intact
Right arm board applied.
Sheath prepped and inserted in the right radial artery.
The ECG shows a sinus bradycardia. ECG rate  = 53 bpm.
The images have been sent and verified in PACs
The right DP pulse is 2+. The right radial pulse is 2+.
There were no immediate complications during the procedure.
There were no immediate complications during the procedure.
Total contrast given (ml) 70
dry, intact, no bleeding and no hematoma.
show

## 2024-07-25 DIAGNOSIS — E78.5 HYPERLIPIDEMIA LDL GOAL <130: ICD-10-CM

## 2024-07-25 RX ORDER — ROSUVASTATIN CALCIUM 10 MG/1
10 TABLET, COATED ORAL AT BEDTIME
Qty: 90 TABLET | Refills: 2 | Status: SHIPPED | OUTPATIENT
Start: 2024-07-25

## 2024-07-25 NOTE — TELEPHONE ENCOUNTER
Prescription approved per [facility] Refill Protocol.  Bridget Drake, RN  [facility] Triage Nurse

## 2024-08-06 ENCOUNTER — OFFICE VISIT (OUTPATIENT)
Dept: PEDIATRICS | Facility: CLINIC | Age: 64
End: 2024-08-06
Payer: MEDICARE

## 2024-08-06 ENCOUNTER — ANCILLARY PROCEDURE (OUTPATIENT)
Dept: BONE DENSITY | Facility: CLINIC | Age: 64
End: 2024-08-06
Attending: STUDENT IN AN ORGANIZED HEALTH CARE EDUCATION/TRAINING PROGRAM
Payer: MEDICARE

## 2024-08-06 VITALS
WEIGHT: 182.9 LBS | BODY MASS INDEX: 28.71 KG/M2 | HEIGHT: 67 IN | RESPIRATION RATE: 16 BRPM | HEART RATE: 58 BPM | OXYGEN SATURATION: 97 % | DIASTOLIC BLOOD PRESSURE: 77 MMHG | TEMPERATURE: 97.8 F | SYSTOLIC BLOOD PRESSURE: 116 MMHG

## 2024-08-06 DIAGNOSIS — Z01.818 PREOP GENERAL PHYSICAL EXAM: Primary | ICD-10-CM

## 2024-08-06 DIAGNOSIS — Z78.0 ASYMPTOMATIC MENOPAUSAL STATE: ICD-10-CM

## 2024-08-06 DIAGNOSIS — M17.12 PRIMARY OSTEOARTHRITIS OF LEFT KNEE: ICD-10-CM

## 2024-08-06 DIAGNOSIS — Z13.820 SCREENING FOR OSTEOPOROSIS: ICD-10-CM

## 2024-08-06 DIAGNOSIS — E61.1 IRON DEFICIENCY: ICD-10-CM

## 2024-08-06 LAB
ANION GAP SERPL CALCULATED.3IONS-SCNC: 12 MMOL/L (ref 7–15)
BUN SERPL-MCNC: 20.7 MG/DL (ref 8–23)
CALCIUM SERPL-MCNC: 10.2 MG/DL (ref 8.8–10.4)
CHLORIDE SERPL-SCNC: 102 MMOL/L (ref 98–107)
CREAT SERPL-MCNC: 0.9 MG/DL (ref 0.51–0.95)
EGFRCR SERPLBLD CKD-EPI 2021: 70 ML/MIN/1.73M2
ERYTHROCYTE [DISTWIDTH] IN BLOOD BY AUTOMATED COUNT: 14.3 % (ref 10–15)
FERRITIN SERPL-MCNC: 22 NG/ML (ref 11–328)
GLUCOSE SERPL-MCNC: 92 MG/DL (ref 70–99)
HCO3 SERPL-SCNC: 23 MMOL/L (ref 22–29)
HCT VFR BLD AUTO: 37.5 % (ref 35–47)
HGB BLD-MCNC: 12.1 G/DL (ref 11.7–15.7)
MCH RBC QN AUTO: 24.2 PG (ref 26.5–33)
MCHC RBC AUTO-ENTMCNC: 32.3 G/DL (ref 31.5–36.5)
MCV RBC AUTO: 75 FL (ref 78–100)
PLATELET # BLD AUTO: 244 10E3/UL (ref 150–450)
POTASSIUM SERPL-SCNC: 4.3 MMOL/L (ref 3.4–5.3)
RBC # BLD AUTO: 4.99 10E6/UL (ref 3.8–5.2)
SODIUM SERPL-SCNC: 137 MMOL/L (ref 135–145)
WBC # BLD AUTO: 6.9 10E3/UL (ref 4–11)

## 2024-08-06 PROCEDURE — 93000 ELECTROCARDIOGRAM COMPLETE: CPT | Performed by: STUDENT IN AN ORGANIZED HEALTH CARE EDUCATION/TRAINING PROGRAM

## 2024-08-06 PROCEDURE — 80048 BASIC METABOLIC PNL TOTAL CA: CPT | Performed by: STUDENT IN AN ORGANIZED HEALTH CARE EDUCATION/TRAINING PROGRAM

## 2024-08-06 PROCEDURE — 77080 DXA BONE DENSITY AXIAL: CPT | Mod: TC | Performed by: PHYSICIAN ASSISTANT

## 2024-08-06 PROCEDURE — 85027 COMPLETE CBC AUTOMATED: CPT | Performed by: STUDENT IN AN ORGANIZED HEALTH CARE EDUCATION/TRAINING PROGRAM

## 2024-08-06 PROCEDURE — 99214 OFFICE O/P EST MOD 30 MIN: CPT | Performed by: STUDENT IN AN ORGANIZED HEALTH CARE EDUCATION/TRAINING PROGRAM

## 2024-08-06 PROCEDURE — 36415 COLL VENOUS BLD VENIPUNCTURE: CPT | Performed by: STUDENT IN AN ORGANIZED HEALTH CARE EDUCATION/TRAINING PROGRAM

## 2024-08-06 PROCEDURE — 82728 ASSAY OF FERRITIN: CPT | Performed by: STUDENT IN AN ORGANIZED HEALTH CARE EDUCATION/TRAINING PROGRAM

## 2024-08-06 ASSESSMENT — PAIN SCALES - GENERAL: PAINLEVEL: NO PAIN (0)

## 2024-08-06 NOTE — PROGRESS NOTES
[facility]  [facility]  [address]  [address]  [place] [address]  Phone: [phone]  Fax: [phone]  Primary Provider: Deirdre E. Milligan, MD  Pre-op Performing Provider: Deirdre E. Milligan, MD  Aug 6, 2024             8/6/2024   Surgical Information   What procedure is being done? preop   Facility or Hospital where procedure/surgery will be performed: [facility]   Who is doing the procedure / surgery? Left knee replacement   Date of surgery / procedure: august 19 2024   Time of surgery / procedure: unknown   Where do you plan to recover after surgery? at home with family        Fax number for surgical facility: [phone]    Assessment & Plan     The proposed surgical procedure is considered INTERMEDIATE risk.    Preop general physical exam  Medically optimized for surgery.  - CBC with platelets; Future  - Ferritin; Future  - EKG 12-lead complete w/read - Clinics  - Basic metabolic panel  (Ca, Cl, CO2, Creat, Gluc, K, Na, BUN); Future  - CBC with platelets  - Ferritin  - Basic metabolic panel  (Ca, Cl, CO2, Creat, Gluc, K, Na, BUN)    Primary osteoarthritis of left knee  Getting total knee arthroplasty.  - CBC with platelets; Future  - Ferritin; Future  - CBC with platelets  - Ferritin    Iron deficiency  Taking iron supplement every other day. Hemoglobin is >12.  - Ferritin; Future  - Ferritin         - No identified additional risk factors other than previously addressed    Antiplatelet or Anticoagulation Medication Instructions   - Patient is on no antiplatelet or anticoagulation medications.    Additional Medication Instructions  Take all scheduled medications on the day of surgery    Recommendation  Approval given to proceed with proposed procedure, without further diagnostic evaluation.    Herbert Jorgensen is a 64 year old, presenting for the following:  Pre-Op Exam          8/6/2024     1:44 PM   Additional Questions   Roomed  by [facility]   Accompanied by self     HPI related to upcoming procedure: Chronic left eft knee pain and osteoarthritis. Has been doing cortisone injections for past 2 years. Will be getting knee replacement.        8/6/2024   Pre-Op Questionnaire   Have you ever had a heart attack or stroke? No   Have you ever had surgery on your heart or blood vessels, such as a stent placement, a coronary artery bypass, or surgery on an artery in your head, neck, heart, or legs? No   Do you have chest pain with activity? No   Do you have a history of heart failure? No   Do you currently have a cold, bronchitis or symptoms of other infection? No   Do you have a cough, shortness of breath, or wheezing? No   Do you or anyone in your family have previous history of blood clots? (!) YES - no previous personal history of DVT or pulmonary embolism    Do you or does anyone in your family have a serious bleeding problem such as prolonged bleeding following surgeries or cuts? No   Have you ever had problems with anemia or been told to take iron pills? (!) YES - taking iron supplements   Have you had any abnormal blood loss such as black, tarry or bloody stools, or abnormal vaginal bleeding? No   Have you ever had a blood transfusion? No   Are you willing to have a blood transfusion if it is medically needed before, during, or after your surgery? Yes   Have you or any of your relatives ever had problems with anesthesia? No   Do you have sleep apnea, excessive snoring or daytime drowsiness? (!) YES   Do you have a CPAP machine? Yes   Do you have any artifical heart valves or other implanted medical devices like a pacemaker, defibrillator, or continuous glucose monitor? No   Do you have artificial joints? No   Are you allergic to latex? No        Health Care Directive  Patient does not have a Health Care Directive or Living Will: Discussed advance care planning with patient; information given to patient to review.    Preoperative Review of     reviewed - controlled substances reflected in medication list. Rare use of oxycodone for severe migraine headache not relieved with other medications.          Patient Active Problem List    Diagnosis Date Noted    Major depressive disorder, single episode, mild (H24) 02/20/2023     Priority: Medium    Status post coronary angiogram 01/18/2023     Priority: Medium    Mild depression 04/17/2019     Priority: Medium    S/P hysterectomy 02/18/2014     Priority: Medium    Other migraine without status migrainosus, not intractable 12/03/2012     Priority: Medium    Perimenopause 12/03/2012     Priority: Medium    MIGUEL on CPAP      Priority: Medium    Anxiety 01/09/2012     Priority: Medium    Overweight (BMI 25.0-29.9) 11/21/2011     Priority: Medium    HYPERLIPIDEMIA LDL GOAL <130 10/31/2010     Priority: Medium    Other health problem within the family 10/23/2007     Priority: Medium    Ulcerative colitis (H) 06/15/2004     Priority: Medium     Problem list name updated by automated process. Provider to review        Past Medical History:   Diagnosis Date    Heavy menses     Migraine, unspecified, without mention of intractable migraine without mention of status migrainosus     intolerant imitrex    Mixed hyperlipidemia     MIGUEL on CPAP     Temporomandibular joint disorders, unspecified     Ulcerative colitis (H)      Past Surgical History:   Procedure Laterality Date    APPENDECTOMY      CV CORONARY ANGIOGRAM N/A 1/18/2023    Procedure: Coronary Angiogram;  Surgeon: Samy Silveira MD;  Location:  [facility]    CYSTOSCOPY N/A 2/18/2014    Procedure: CYSTOSCOPY;  Surgeon: Rama Burton MD;  Location: [facility] OR    KNEE SURGERY  4/2012    left knee, meniscus    laparascopy  x 2 in age 30s    Infertiility    LAPAROSCOPIC HYSTERECTOMY TOTAL, BILATERAL SALPINGO-OOPHORECTOMY, COMBINED Bilateral 2/18/2014    Procedure: COMBINED LAPAROSCOPIC HYSTERECTOMY TOTAL, SALPINGO-OOPHORECTOMY;  Surgeon:  "Rama Burton MD;  Location:  OR     Current Outpatient Medications   Medication Sig Dispense Refill    citalopram (CELEXA) 20 MG tablet Take 1.5 tablets (30 mg) by mouth daily 135 tablet 4    ipratropium (ATROVENT) 0.03 % nasal spray Spray 1 spray into both nostrils      Multiple Vitamin (DAILY MULTIVITAMIN PO) Take 1 tablet by mouth daily      omeprazole (PRILOSEC OTC) 20 MG EC tablet Take 20 mg by mouth daily      ondansetron (ZOFRAN ODT) 8 MG ODT tab Take 1 tablet (8 mg) by mouth every 8 hours as needed for nausea 30 tablet 1    oxyCODONE (ROXICODONE) 5 MG tablet Take 1-2 tablets (5-10 mg) by mouth every 6 hours as needed (intractable migraine) 45 tablet 0    prochlorperazine (COMPAZINE) 5 MG tablet Take 1 tablet (5 mg) by mouth every 6 hours as needed for nausea or vomiting 30 tablet 1    rizatriptan (MAXALT) 10 MG tablet TAKE ONE TABLET BY MOUTH AT ONSET OF MIGRAINE MAY REPEAT DOSE IN 2 HOURS MAX 30MG IN 24 HOURS 12 tablet 11    rosuvastatin (CRESTOR) 10 MG tablet Take 1 tablet (10 mg) by mouth at bedtime 90 tablet 2       Allergies   Allergen Reactions    No Known Drug Allergy         Social History     Tobacco Use    Smoking status: Former     Types: Cigarettes     Passive exposure: Never    Smokeless tobacco: Never    Tobacco comments:     Quit 28 years ago   Substance Use Topics    Alcohol use: Yes     Alcohol/week: 0.0 standard drinks of alcohol     Comment: socially        History   Drug Use No               Objective    /77 (BP Location: Right arm, Patient Position: Sitting, Cuff Size: Adult Large)   Pulse 58   Temp 97.8  F (36.6  C) (Tympanic)   Resp 16   Ht 1.708 m (5' 7.25\")   Wt 83 kg (182 lb 14.4 oz)   LMP 02/09/2014 (Exact Date)   SpO2 97%   BMI 28.43 kg/m     Estimated body mass index is 28.43 kg/m  as calculated from the following:    Height as of this encounter: 1.708 m (5' 7.25\").    Weight as of this encounter: 83 kg (182 lb 14.4 oz).  Physical Exam  GENERAL: alert " and no distress  EYES: Eyes grossly normal to inspection, and conjunctivae and sclerae normal  HENT: ear canals and TM's normal, nose and mouth without ulcers or lesions  NECK: no adenopathy, no asymmetry, masses, or scars  RESP: lungs clear to auscultation - no rales, rhonchi or wheezes  CV: regular rate and rhythm, normal S1 S2, no S3 or S4, no murmur, click or rub, no peripheral edema  ABDOMEN: soft, nontender, no hepatosplenomegaly, no masses  MS: no gross musculoskeletal defects noted, no edema  SKIN: no suspicious lesions or rashes  NEURO: Normal strength and tone, mentation intact and speech normal  PSYCH: mentation appears normal, affect normal/bright    Recent Labs   Lab Test 12/05/23  1558   HGB 11.8         POTASSIUM 4.4   CR 0.95   A1C 5.3        Diagnostics  Recent Results (from the past 168 hour(s))   CBC with platelets    Collection Time: 08/06/24  2:36 PM   Result Value Ref Range    WBC Count 6.9 4.0 - 11.0 10e3/uL    RBC Count 4.99 3.80 - 5.20 10e6/uL    Hemoglobin 12.1 11.7 - 15.7 g/dL    Hematocrit 37.5 35.0 - 47.0 %    MCV 75 (L) 78 - 100 fL    MCH 24.2 (L) 26.5 - 33.0 pg    MCHC 32.3 31.5 - 36.5 g/dL    RDW 14.3 10.0 - 15.0 %    Platelet Count 244 150 - 450 10e3/uL   Ferritin    Collection Time: 08/06/24  2:36 PM   Result Value Ref Range    Ferritin 22 11 - 328 ng/mL   Basic metabolic panel  (Ca, Cl, CO2, Creat, Gluc, K, Na, BUN)    Collection Time: 08/06/24  2:36 PM   Result Value Ref Range    Sodium 137 135 - 145 mmol/L    Potassium 4.3 3.4 - 5.3 mmol/L    Chloride 102 98 - 107 mmol/L    Carbon Dioxide (CO2) 23 22 - 29 mmol/L    Anion Gap 12 7 - 15 mmol/L    Urea Nitrogen 20.7 8.0 - 23.0 mg/dL    Creatinine 0.90 0.51 - 0.95 mg/dL    GFR Estimate 70 >60 mL/min/1.73m2    Calcium 10.2 8.8 - 10.4 mg/dL    Glucose 92 70 - 99 mg/dL      EKG: appears normal, NSR, normal axis, normal intervals, no acute ST/T changes c/w ischemia, no LVH by voltage criteria    Revised Cardiac Risk Index  (RCRI)  The patient has the following serious cardiovascular risks for perioperative complications:   - No serious cardiac risks = 0 points     RCRI Interpretation: 0 points: Class I (very low risk - 0.4% complication rate)         Signed Electronically by: Deirdre E. Milligan, MD  A copy of this evaluation report is provided to the requesting physician.

## 2024-08-06 NOTE — PATIENT INSTRUCTIONS

## 2024-08-06 NOTE — LETTER
August 13, 2024      [address]lyn [address]  [address] [place] [address]        Dear Joslyn,     Here are your recent lab results:     Your electrolytes and kidney function were normal.      The ferritin is slightly low (<30). The hemoglobin is in the normal range. I recommend continuing an iron supplement every other day for best absorption.      Best of luck with surgery!     Please let us know if you have questions or concerns.      Best, Deirdre Milligan, MD   Internal Medicine & Pediatrics   [facility] Orders   Ferritin   Result Value Ref Range    Ferritin 22 11 - 328 ng/mL

## 2024-08-12 ENCOUNTER — MYC MEDICAL ADVICE (OUTPATIENT)
Dept: PEDIATRICS | Facility: CLINIC | Age: 64
End: 2024-08-12
Payer: MEDICARE

## 2024-08-12 DIAGNOSIS — G43.809 OTHER MIGRAINE WITHOUT STATUS MIGRAINOSUS, NOT INTRACTABLE: ICD-10-CM

## 2024-08-12 RX ORDER — OXYCODONE HYDROCHLORIDE 5 MG/1
5-10 TABLET ORAL EVERY 6 HOURS PRN
Qty: 45 TABLET | Refills: 0 | Status: CANCELLED | OUTPATIENT
Start: 2024-08-12

## 2024-08-12 RX ORDER — OXYCODONE HYDROCHLORIDE 5 MG/1
5-10 TABLET ORAL EVERY 6 HOURS PRN
Qty: 45 TABLET | Refills: 0 | Status: SHIPPED | OUTPATIENT
Start: 2024-08-12

## 2024-08-12 NOTE — TELEPHONE ENCOUNTER
"See patient's [employer] message   - Patient requesting a refill of her oxyCODONE (ROXICODONE) 5 MG tablet medication     Upon chart review:   - 8/6/2024 Office Visit note with Dr. Daugherty states: \"Rare use of oxycodone for severe migraine headache not relieved with other medications.\"     oxyCODONE (ROXICODONE) 5 MG tablet 45 tablet 0 12/5/2023 -- No   Sig - Route: Take 1-2 tablets (5-10 mg) by mouth every 6 hours as needed (intractable migraine) - Oral     - Pended the patient's oxyCODONE (ROXICODONE) 5 MG tablet medication     Dr. Milligan, please review and refill as appropriate.     Eri FELDER RN   [facility]   "

## 2024-08-19 ENCOUNTER — TRANSFERRED RECORDS (OUTPATIENT)
Dept: HEALTH INFORMATION MANAGEMENT | Facility: CLINIC | Age: 64
End: 2024-08-19

## 2024-10-01 NOTE — TELEPHONE ENCOUNTER
FUTURE VISIT INFORMATION      FUTURE VISIT INFORMATION:  Date: 11/25/24  Time: 7:30AM  [place] [facility]  REFERRAL INFORMATION:  Referring provider:  Lea Machado MD  Referring providers clinic:  [facility]   Reason for visit/diagnosis  Globus sensation [R09.A2]  REF BY Lea Machado MD  RECS IN [facility]  CONF LOC  SCHED W/PT     RECORDS REQUESTED FROM:       Clinic name Comments Records Status Imaging Status   [facility]  3/8/24, 12/5/23- Mychart referral  /OV Lea Machado MD [facility]     [employer] endo 3/4/24- EGD Scanned in     Imaging  12/28/22- XR Chest  Epic  PACS

## 2024-10-21 ENCOUNTER — TELEPHONE (OUTPATIENT)
Dept: PEDIATRICS | Facility: CLINIC | Age: 64
End: 2024-10-21
Payer: MEDICARE

## 2024-10-21 DIAGNOSIS — E61.1 IRON DEFICIENCY: Primary | ICD-10-CM

## 2024-10-21 NOTE — TELEPHONE ENCOUNTER
Order/Referral Request    Who is requesting: patient    Orders being requested: lab order for hemoglobin    Reason service is needed/diagnosis: patient is wanting to see where her levels are at and if she's back to normal being 9 weeks postop.  - Patient has been unsuccessful with taking her iron supplements as she says they're hard on her stomach.  She has been supplementing with spinach and red meat.    When are orders needed by: asap    Has this been discussed with Provider: DONAK    Does patient have a preference on a Group/Provider/Facility? [facility]    Does patient have an appointment scheduled?: No    Where to send orders: Place orders within Epic    Could we send this information to you in [facility] or would you prefer to receive a phone call?:   Patient would prefer a phone call   Okay to leave a detailed message?: Yes at Cell number on file:    Telephone Information:   Mobile [phone]         Starla CHRISTENSEN, - [place] [address]  Primary Care- [facility] [place]  [facility] Services

## 2024-10-23 ENCOUNTER — LAB (OUTPATIENT)
Dept: LAB | Facility: CLINIC | Age: 64
End: 2024-10-23
Payer: MEDICARE

## 2024-10-23 DIAGNOSIS — E61.1 IRON DEFICIENCY: ICD-10-CM

## 2024-10-23 LAB
BASOPHILS # BLD AUTO: 0 10E3/UL (ref 0–0.2)
BASOPHILS NFR BLD AUTO: 0 %
EOSINOPHIL # BLD AUTO: 0.1 10E3/UL (ref 0–0.7)
EOSINOPHIL NFR BLD AUTO: 1 %
ERYTHROCYTE [DISTWIDTH] IN BLOOD BY AUTOMATED COUNT: 14.8 % (ref 10–15)
HCT VFR BLD AUTO: 36.8 % (ref 35–47)
HGB BLD-MCNC: 11.4 G/DL (ref 11.7–15.7)
IMM GRANULOCYTES # BLD: 0 10E3/UL
IMM GRANULOCYTES NFR BLD: 0 %
LYMPHOCYTES # BLD AUTO: 1.7 10E3/UL (ref 0.8–5.3)
LYMPHOCYTES NFR BLD AUTO: 32 %
MCH RBC QN AUTO: 23.4 PG (ref 26.5–33)
MCHC RBC AUTO-ENTMCNC: 31 G/DL (ref 31.5–36.5)
MCV RBC AUTO: 75 FL (ref 78–100)
MONOCYTES # BLD AUTO: 0.9 10E3/UL (ref 0–1.3)
MONOCYTES NFR BLD AUTO: 17 %
NEUTROPHILS # BLD AUTO: 2.7 10E3/UL (ref 1.6–8.3)
NEUTROPHILS NFR BLD AUTO: 50 %
PLATELET # BLD AUTO: 214 10E3/UL (ref 150–450)
RBC # BLD AUTO: 4.88 10E6/UL (ref 3.8–5.2)
WBC # BLD AUTO: 5.4 10E3/UL (ref 4–11)

## 2024-10-23 PROCEDURE — 36415 COLL VENOUS BLD VENIPUNCTURE: CPT

## 2024-10-23 PROCEDURE — 85025 COMPLETE CBC W/AUTO DIFF WBC: CPT

## 2024-11-25 ENCOUNTER — PRE VISIT (OUTPATIENT)
Dept: OTOLARYNGOLOGY | Facility: CLINIC | Age: 64
End: 2024-11-25

## 2025-02-26 DIAGNOSIS — E78.5 HYPERLIPIDEMIA LDL GOAL <130: ICD-10-CM

## 2025-02-27 RX ORDER — ROSUVASTATIN CALCIUM 10 MG/1
10 TABLET, COATED ORAL AT BEDTIME
Qty: 90 TABLET | Refills: 2 | OUTPATIENT
Start: 2025-02-27

## 2025-05-17 DIAGNOSIS — E78.5 HYPERLIPIDEMIA LDL GOAL <130: ICD-10-CM

## 2025-05-19 RX ORDER — ROSUVASTATIN CALCIUM 10 MG/1
10 TABLET, COATED ORAL AT BEDTIME
Qty: 90 TABLET | Refills: 2 | Status: SHIPPED | OUTPATIENT
Start: 2025-05-19

## 2025-05-21 DIAGNOSIS — K21.00 GASTROESOPHAGEAL REFLUX DISEASE WITH ESOPHAGITIS WITHOUT HEMORRHAGE: Primary | ICD-10-CM

## 2025-05-21 NOTE — TELEPHONE ENCOUNTER
Clinic RN: Please investigate patient's chart or contact patient if the information cannot be found because the medication is listed as historical or discontinued. Confirm patient is taking this medication. Document findings and route refill encounter to provider for approval or denial.  Ramya Bryan RN, BSN  [facility]

## 2025-05-21 NOTE — TELEPHONE ENCOUNTER
RN called and spoke with patient. Patient states she referred to GI by PCP back in 2024 and was told by [facility] to take omeprazole 40mg tablet twice daily. See transferred records from 3/4/2024:     Patient has been taking omeprazole 40mg twice daily, but not on consistent basis. This prescription from GI has .     Patient asking if PCP would prescribe omeprazole DR 40mg twice daily.     Sukhjinder JENKINS RN 2025 at 3:35 PM

## 2025-05-22 RX ORDER — OMEPRAZOLE 40 MG/1
40 CAPSULE, DELAYED RELEASE ORAL 2 TIMES DAILY
Qty: 180 CAPSULE | Refills: 4 | Status: SHIPPED | OUTPATIENT
Start: 2025-05-22

## 2025-05-22 RX ORDER — OMEPRAZOLE 20 MG/1
CAPSULE, DELAYED RELEASE ORAL
Qty: 42 CAPSULE | OUTPATIENT
Start: 2025-05-22

## 2025-06-09 ENCOUNTER — PATIENT OUTREACH (OUTPATIENT)
Dept: CARE COORDINATION | Facility: CLINIC | Age: 65
End: 2025-06-09
Payer: MEDICARE

## 2025-06-24 ENCOUNTER — TELEPHONE (OUTPATIENT)
Dept: PEDIATRICS | Facility: CLINIC | Age: 65
End: 2025-06-24
Payer: MEDICARE

## 2025-06-24 NOTE — TELEPHONE ENCOUNTER
LVM x1 attempt.    Contacts       Contact Date/Time Type Contact Phone/Fax    06/24/2025 11:07 AM CDT Phone (Incoming) Joslyn Sanchez (Self) [phone] (M)    06/24/2025 01:52 PM CDT Phone (Outgoing) Joslyn Sanhcez (Self) [phone] (M)    Left Message           Attempted to reach patient to: Schedule an appointment    When patient returns call, please take this action: Assist with scheduling    Reason for the visit: Preventive    When to schedule: On or after July    Additional comments/info: See provider note below and assist scheduling    If unable to schedule: Transfer to TC line (or update telephone encounter in after-hours)        Starla CHRISTENSEN, - [facility]  Primary Care- [place] [facility]

## 2025-06-24 NOTE — TELEPHONE ENCOUNTER
Can use next green available or resident when I'm precepting (July 24th, August 7th, or August 14th in the afternoon).    Deirdre Milligan, MD  Internal Medicine & Pediatrics  [facility]  She/her

## 2025-06-24 NOTE — TELEPHONE ENCOUNTER
Reason for Call:  Appointment Request    Patient requesting this type of appt:  Preventive     Requested provider: Milligan, Deirdre E    Reason patient unable to be scheduled: Not within requested timeframe    When does patient want to be seen/preferred time: Due in July    Comments: prefers PCP    Could we send this information to you in [facility] or would you prefer to receive a phone call?:   Patient would prefer a phone call   Okay to leave a detailed message?: Yes at Cell number on file:    Telephone Information:   Mobile [phone]       Call taken on 6/24/2025 at 11:07 AM by Kierra MEJÍA

## 2025-06-24 NOTE — TELEPHONE ENCOUNTER
Patient returned call.  - scheduled annual for 7/14/25 with Milligan.      Starla CHRISTENSEN, - [facility]  Primary Care- [place] [facility]

## 2025-07-11 ENCOUNTER — ANCILLARY PROCEDURE (OUTPATIENT)
Dept: MAMMOGRAPHY | Facility: CLINIC | Age: 65
End: 2025-07-11
Attending: STUDENT IN AN ORGANIZED HEALTH CARE EDUCATION/TRAINING PROGRAM
Payer: MEDICARE

## 2025-07-11 DIAGNOSIS — Z12.31 SCREENING MAMMOGRAM, ENCOUNTER FOR: ICD-10-CM

## 2025-07-11 PROCEDURE — 77063 BREAST TOMOSYNTHESIS BI: CPT | Mod: TC | Performed by: RADIOLOGY

## 2025-07-11 PROCEDURE — 77067 SCR MAMMO BI INCL CAD: CPT | Mod: TC | Performed by: RADIOLOGY

## 2025-07-13 SDOH — HEALTH STABILITY: PHYSICAL HEALTH: ON AVERAGE, HOW MANY DAYS PER WEEK DO YOU ENGAGE IN MODERATE TO STRENUOUS EXERCISE (LIKE A BRISK WALK)?: 4 DAYS

## 2025-07-13 SDOH — HEALTH STABILITY: PHYSICAL HEALTH: ON AVERAGE, HOW MANY MINUTES DO YOU ENGAGE IN EXERCISE AT THIS LEVEL?: 50 MIN

## 2025-07-13 ASSESSMENT — SOCIAL DETERMINANTS OF HEALTH (SDOH): HOW OFTEN DO YOU GET TOGETHER WITH FRIENDS OR RELATIVES?: ONCE A WEEK

## 2025-07-14 ENCOUNTER — OFFICE VISIT (OUTPATIENT)
Dept: PEDIATRICS | Facility: CLINIC | Age: 65
End: 2025-07-14
Payer: MEDICARE

## 2025-07-14 VITALS
HEIGHT: 67 IN | SYSTOLIC BLOOD PRESSURE: 133 MMHG | HEART RATE: 60 BPM | BODY MASS INDEX: 28.52 KG/M2 | RESPIRATION RATE: 16 BRPM | WEIGHT: 181.7 LBS | OXYGEN SATURATION: 100 % | TEMPERATURE: 98 F | DIASTOLIC BLOOD PRESSURE: 81 MMHG

## 2025-07-14 DIAGNOSIS — Z00.00 ENCOUNTER FOR MEDICARE ANNUAL WELLNESS EXAM: Primary | ICD-10-CM

## 2025-07-14 DIAGNOSIS — Z12.11 SCREEN FOR COLON CANCER: ICD-10-CM

## 2025-07-14 DIAGNOSIS — E78.5 HYPERLIPIDEMIA LDL GOAL <130: ICD-10-CM

## 2025-07-14 DIAGNOSIS — F41.9 ANXIETY: ICD-10-CM

## 2025-07-14 DIAGNOSIS — K90.89 OTHER INTESTINAL MALABSORPTION: ICD-10-CM

## 2025-07-14 LAB
ALBUMIN SERPL BCG-MCNC: 4.7 G/DL (ref 3.5–5.2)
ALP SERPL-CCNC: 78 U/L (ref 40–150)
ALT SERPL W P-5'-P-CCNC: 19 U/L (ref 0–50)
ANION GAP SERPL CALCULATED.3IONS-SCNC: 10 MMOL/L (ref 7–15)
AST SERPL W P-5'-P-CCNC: 25 U/L (ref 0–45)
BILIRUB SERPL-MCNC: 1.1 MG/DL
BUN SERPL-MCNC: 15 MG/DL (ref 8–23)
CALCIUM SERPL-MCNC: 10 MG/DL (ref 8.8–10.4)
CHLORIDE SERPL-SCNC: 103 MMOL/L (ref 98–107)
CHOLEST SERPL-MCNC: 200 MG/DL
CREAT SERPL-MCNC: 0.87 MG/DL (ref 0.51–0.95)
EGFRCR SERPLBLD CKD-EPI 2021: 73 ML/MIN/1.73M2
FASTING STATUS PATIENT QL REPORTED: YES
FASTING STATUS PATIENT QL REPORTED: YES
FERRITIN SERPL-MCNC: 21 NG/ML (ref 11–328)
GLUCOSE SERPL-MCNC: 98 MG/DL (ref 70–99)
HCO3 SERPL-SCNC: 26 MMOL/L (ref 22–29)
HDLC SERPL-MCNC: 51 MG/DL
HGB BLD-MCNC: 12.4 G/DL (ref 11.7–15.7)
LDLC SERPL CALC-MCNC: 115 MG/DL
MCV RBC AUTO: 77 FL (ref 78–100)
NONHDLC SERPL-MCNC: 149 MG/DL
POTASSIUM SERPL-SCNC: 4.2 MMOL/L (ref 3.4–5.3)
PROT SERPL-MCNC: 7.3 G/DL (ref 6.4–8.3)
SODIUM SERPL-SCNC: 139 MMOL/L (ref 135–145)
TRIGL SERPL-MCNC: 171 MG/DL
VIT D+METAB SERPL-MCNC: 34 NG/ML (ref 20–50)

## 2025-07-14 PROCEDURE — 80061 LIPID PANEL: CPT | Performed by: STUDENT IN AN ORGANIZED HEALTH CARE EDUCATION/TRAINING PROGRAM

## 2025-07-14 PROCEDURE — G0439 PPPS, SUBSEQ VISIT: HCPCS | Performed by: STUDENT IN AN ORGANIZED HEALTH CARE EDUCATION/TRAINING PROGRAM

## 2025-07-14 PROCEDURE — 85018 HEMOGLOBIN: CPT | Performed by: STUDENT IN AN ORGANIZED HEALTH CARE EDUCATION/TRAINING PROGRAM

## 2025-07-14 PROCEDURE — 80053 COMPREHEN METABOLIC PANEL: CPT | Performed by: STUDENT IN AN ORGANIZED HEALTH CARE EDUCATION/TRAINING PROGRAM

## 2025-07-14 PROCEDURE — 82728 ASSAY OF FERRITIN: CPT | Performed by: STUDENT IN AN ORGANIZED HEALTH CARE EDUCATION/TRAINING PROGRAM

## 2025-07-14 PROCEDURE — 82306 VITAMIN D 25 HYDROXY: CPT | Performed by: STUDENT IN AN ORGANIZED HEALTH CARE EDUCATION/TRAINING PROGRAM

## 2025-07-14 PROCEDURE — 3075F SYST BP GE 130 - 139MM HG: CPT | Performed by: STUDENT IN AN ORGANIZED HEALTH CARE EDUCATION/TRAINING PROGRAM

## 2025-07-14 PROCEDURE — 3079F DIAST BP 80-89 MM HG: CPT | Performed by: STUDENT IN AN ORGANIZED HEALTH CARE EDUCATION/TRAINING PROGRAM

## 2025-07-14 PROCEDURE — 36415 COLL VENOUS BLD VENIPUNCTURE: CPT | Performed by: STUDENT IN AN ORGANIZED HEALTH CARE EDUCATION/TRAINING PROGRAM

## 2025-07-14 RX ORDER — CITALOPRAM HYDROBROMIDE 20 MG/1
30 TABLET ORAL DAILY
Qty: 135 TABLET | Refills: 4 | Status: SHIPPED | OUTPATIENT
Start: 2025-07-14

## 2025-07-14 NOTE — PROGRESS NOTES
"Preventive Care Visit  [facility] Deirdre E. Milligan, MD, Internal Medicine - Pediatrics  Jul 14, 2025        Assessment & Plan     Encounter for Medicare annual wellness exam  - COMPREHENSIVE METABOLIC PANEL; Future  - Hemoglobin; Future  - Vitamin D Deficiency; Future  - Ferritin; Future  - COMPREHENSIVE METABOLIC PANEL  - Hemoglobin  - Vitamin D Deficiency  - Ferritin    Hyperlipidemia LDL goal <130  Continue rosuvastatin for primary prevention.  - Lipid panel reflex to direct LDL Non-fasting; Future  - Lipid panel reflex to direct LDL Non-fasting    Screen for colon cancer  Will reschedule - had to cancel when mother was hospitalized for shingles.    Anxiety  Will trial decrease dose from 30 to 20mg - provided 30mg prescription as will wait until after vacation to decrease.  - citalopram (CELEXA) 20 MG tablet; Take 1.5 tablets (30 mg) by mouth daily.    Other intestinal malabsorption  History colitis. Normal DEXA last year.  - Vitamin D Deficiency; Future  - Vitamin D Deficiency      BMI  Estimated body mass index is 28.25 kg/m  as calculated from the following:    Height as of this encounter: 1.708 m (5' 7.24\").    Weight as of this encounter: 82.4 kg (181 lb 11.2 oz).       Counseling  Appropriate preventive services were addressed with this patient via screening, questionnaire, or discussion as appropriate for fall prevention, nutrition, physical activity, Tobacco-use cessation, social engagement, weight loss and cognition.  Checklist reviewing preventive services available has been given to the patient.  Reviewed patient's diet, addressing concerns and/or questions.   She is at risk for psychosocial distress and has been provided with information to reduce risk.   The patient was provided with written information regarding signs of hearing loss.   Information on urinary incontinence and treatment options given to patient.   I have reviewed Opioid Use Disorder and Substance Use Disorder " risk factors and made any needed referrals.         Subjective   Joslyn is a 65 year old, presenting for the following:  Wellness Visit        7/14/2025     8:13 AM   Additional Questions   Roomed by KS         7/14/2025     8:13 AM   Patient Reported Additional Medications   Patient reports taking the following new medications None          HPI      Amitriptyline (stared for migraine headache) made too tired    Celexa (citalopram): had been on many years at 30mg   -still gets hot flashes sometimes    Going to [facility] soon for vacation    Trying to get back into biking    Retired this past spring  Will watch grandchild a few days per week                 Advance Care Planning    Discussed advance care planning with patient; however, patient declined at this time.        7/13/2025   General Health   How would you rate your overall physical health? Good   Feel stress (tense, anxious, or unable to sleep) To some extent   (!) STRESS CONCERN      7/13/2025   Nutrition   Diet: Regular (no restrictions)         7/13/2025   Exercise   Days per week of moderate/strenous exercise 4 days   Average minutes spent exercising at this level 50 min         7/13/2025   Social Factors   Frequency of gathering with friends or relatives Once a week   Worry food won't last until get money to buy more No   Food not last or not have enough money for food? No   Do you have housing? (Housing is defined as stable permanent housing and does not include staying outside in a car, in a tent, in an abandoned building, in an overnight shelter, or couch-surfing.) Yes   Are you worried about losing your housing? No   Lack of transportation? No   Unable to get utilities (heat,electricity)? No         7/13/2025   Fall Risk   Fallen 2 or more times in the past year? No   Trouble with walking or balance? No          7/13/2025   Activities of Daily Living- Home Safety   Needs help with the following daily activites None of the above   Safety concerns  in the home None of the above         7/13/2025   Dental   Dentist two times every year? Yes         7/13/2025   Hearing Screening   Hearing concerns? (!) I FEEL THAT PEOPLE ARE MUMBLING OR NOT SPEAKING CLEARLY.    None of the above       Multiple values from one day are sorted in reverse-chronological order         7/13/2025   Driving Risk Screening   Patient/family members have concerns about driving No         7/13/2025   General Alertness/Fatigue Screening   Have you been more tired than usual lately? (!) DECLINE         7/13/2025   Urinary Incontinence Screening   Bothered by leaking urine in past 6 months Yes       Today's PHQ-9 Score:       7/13/2025     2:52 PM   PHQ-9 SCORE   PHQ-9 Total Score MyChart 3 (Minimal depression)   PHQ-9 Total Score 3        Patient-reported         7/13/2025   Substance Use   Alcohol more than 3/day or more than 7/wk No   Do you have a current opioid prescription? (!) YES   How severe/bad is pain from 1 to 10? 5/10   Do you use any other substances recreationally? No   Answers submitted by the patient for this visit:  Patient Health Questionnaire (Submitted on 7/13/2025)  If you checked off any problems, how difficult have these problems made it for you to do your work, take care of things at home, or get along with other people?: Not difficult at all  PHQ9 TOTAL SCORE: 3         No data to display              Low Risk (0-3)  Moderate Risk (4-7)  High Risk (>8)  Social History     Tobacco Use    Smoking status: Former     Types: Cigarettes     Passive exposure: Past    Smokeless tobacco: Never    Tobacco comments:     Quit 28 years ago   Vaping Use    Vaping status: Never Used   Substance Use Topics    Alcohol use: Yes     Comment: socially     Drug use: No           7/11/2025   LAST FHS-7 RESULTS   1st degree relative breast or ovarian cancer No   Any relative bilateral breast cancer No   Any male have breast cancer No   Any ONE woman have BOTH breast AND ovarian cancer No    Any woman with breast cancer before 50yrs No   2 or more relatives with breast AND/OR ovarian cancer No   2 or more relatives with breast AND/OR bowel cancer No              History of abnormal Pap smear: Status post hysterectomy with removal of cervix and no history of CIN2 or greater or cervical cancer. Health Maintenance and Surgical History updated.       ASCVD Risk   The 10-year ASCVD risk score (Dwayne BROWN, et al., 2019) is: 7%    Values used to calculate the score:      Age: 65 years      Sex: Female      Is Non- : No      Diabetic: No      Tobacco smoker: No      Systolic Blood Pressure: 133 mmHg      Is BP treated: No      HDL Cholesterol: 56 mg/dL      Total Cholesterol: 177 mg/dL            Reviewed and updated as needed this visit by Provider                      Current providers sharing in care for this patient include:  Patient Care Team:  Milligan, Deirdre E, MD as PCP - General (Internal Medicine - Pediatrics)  Milligan, Deirdre E, MD as Assigned PCP  Kayy Parnell MD as MD (Otolaryngology)  Kayy Parnell MD as MD (Otolaryngology)    The following health maintenance items are reviewed in Epic and correct as of today:  Health Maintenance   Topic Date Due    COVID-19 VACCINE (4 - 2024-25 season) 09/01/2024    CMP  12/05/2024    LIPID  12/05/2024    COLORECTAL CANCER SCREENING  04/11/2025    ANNUAL REVIEW OF HM ORDERS  07/09/2025    MEDICARE ANNUAL WELLNESS VISIT  07/09/2025    INFLUENZA VACCINE (1) 09/01/2025    PHQ-9  01/14/2026    FALL RISK ASSESSMENT  07/14/2026    MAMMO SCREENING  07/11/2027    DIABETES SCREENING  08/06/2027    ADVANCE CARE PLANNING  08/06/2029    DTAP/TDAP/TD VACCINE (3 - Td or Tdap) 02/20/2033    RSV VACCINE (1 - 1-dose 75+ series) 03/21/2033    DEXA  08/06/2039    HEPATITIS C SCREENING  Completed    DEPRESSION ACTION PLAN  Completed    MIGRAINE ACTION PLAN  Completed    PNEUMOCOCCAL VACCINE 50+ YEARS  Completed    ZOSTER  "VACCINE  Completed    HPV VACCINE  Aged Out    MENINGITIS VACCINE  Aged Out    PAP  Discontinued    LUNG CANCER SCREENING  Discontinued            Objective    Exam  /81 (BP Location: Right arm, Patient Position: Sitting, Cuff Size: Adult Large)   Pulse 60   Temp 98  F (36.7  C) (Temporal)   Resp 16   Ht 1.708 m (5' 7.24\")   Wt 82.4 kg (181 lb 11.2 oz)   LMP 02/09/2014 (Exact Date)   SpO2 100%   BMI 28.25 kg/m     Estimated body mass index is 28.25 kg/m  as calculated from the following:    Height as of this encounter: 1.708 m (5' 7.24\").    Weight as of this encounter: 82.4 kg (181 lb 11.2 oz).    Physical Exam  GENERAL: alert and no distress  EYES: Eyes grossly normal to inspection, and conjunctivae and sclerae normal  HENT: ear canals and TM's normal, nose and mouth without ulcers or lesions  NECK: no adenopathy, no asymmetry, masses, or scars  RESP: lungs clear to auscultation - no rales, rhonchi or wheezes  CV: regular rate and rhythm, normal S1 S2, no S3 or S4, no murmur, click or rub, no peripheral edema  ABDOMEN: soft, nontender, no hepatosplenomegaly, no masses  MS: no gross musculoskeletal defects noted, no edema  SKIN: no suspicious lesions or rashes  NEURO: Normal strength and tone, mentation intact and speech normal  PSYCH: mentation appears normal, affect normal/bright        7/14/2025   Mini Cog   Mini-Cog Not Completed (choose reason) Patient declines       Patient declines, there are NO concerns for cognitive deficits.          7/9/2024   Vision Screen   Patient wears corrective lenses (select all that apply) Worn during vision screen   Vision Screen Results Pass       Signed Electronically by: Deirdre E. Milligan, MD    "

## 2025-07-14 NOTE — PATIENT INSTRUCTIONS
Try taking the celexa 20mg (1 tablet per day) to see if sweating episodes improve    Patient Education   Preventive Care Advice   This is general advice given by our system to help you stay healthy. However, your care team may have specific advice just for you. Please talk to your care team about your preventive care needs.  Nutrition  Eat 5 or more servings of fruits and vegetables each day.  Try wheat bread, brown rice and whole grain pasta (instead of white bread, rice, and pasta).  Get enough calcium and vitamin D. Check the label on foods and aim for 100% of the RDA (recommended daily allowance).  Lifestyle  Exercise at least 150 minutes each week  (30 minutes a day, 5 days a week).  Do muscle strengthening activities 2 days a week. These help control your weight and prevent disease.  No smoking.  Wear sunscreen to prevent skin cancer.  Have a dental exam and cleaning every 6 months.  Yearly exams  See your health care team every year to talk about:  Any changes in your health.  Any medicines your care team has prescribed.  Preventive care, family planning, and ways to prevent chronic diseases.  Shots (vaccines)   HPV shots (up to age 26), if you've never had them before.  Hepatitis B shots (up to age 59), if you've never had them before.  COVID-19 shot: Get this shot when it's due.  Flu shot: Get a flu shot every year.  Tetanus shot: Get a tetanus shot every 10 years.  Pneumococcal, hepatitis A, and RSV shots: Ask your care team if you need these based on your risk.  Shingles shot (for age 50 and up)  General health tests  Diabetes screening:  Starting at age 35, Get screened for diabetes at least every 3 years.  If you are younger than age 35, ask your care team if you should be screened for diabetes.  Cholesterol test: At age 39, start having a cholesterol test every 5 years, or more often if advised.  Bone density scan (DEXA): At age 50, ask your care team if you should have this scan for osteoporosis  (brittle bones).  Hepatitis C: Get tested at least once in your life.  STIs (sexually transmitted infections)  Before age 24: Ask your care team if you should be screened for STIs.  After age 24: Get screened for STIs if you're at risk. You are at risk for STIs (including HIV) if:  You are sexually active with more than one person.  You don't use condoms every time.  You or a partner was diagnosed with a sexually transmitted infection.  If you are at risk for HIV, ask about PrEP medicine to prevent HIV.  Get tested for HIV at least once in your life, whether you are at risk for HIV or not.  Cancer screening tests  Cervical cancer screening: If you have a cervix, begin getting regular cervical cancer screening tests starting at age 21.  Breast cancer scan (mammogram): If you've ever had breasts, begin having regular mammograms starting at age 40. This is a scan to check for breast cancer.  Colon cancer screening: It is important to start screening for colon cancer at age 45.  Have a colonoscopy test every 10 years (or more often if you're at risk) Or, ask your provider about stool tests like a FIT test every year or Cologuard test every 3 years.  To learn more about your testing options, visit:   .  For help making a decision, visit:   https://bit.ly/nz41770.  Prostate cancer screening test: If you have a prostate, ask your care team if a prostate cancer screening test (PSA) at age 55 is right for you.  Lung cancer screening: If you are a current or former smoker ages 50 to 80, ask your care team if ongoing lung cancer screenings are right for you.  For informational purposes only. Not to replace the advice of your health care provider. Copyright   2023 [email or web address]. All rights reserved. Clinically reviewed by the [facility]itions Program. [employer] [id number] - REV 01/24.  Hearing Loss: Care Instructions  Overview     Hearing loss is a sudden or slow decrease in how well you hear. It can range  from slight to profound. Permanent hearing loss can occur with aging. It also can happen when you are exposed long-term to loud noise. Examples include listening to loud music, riding motorcycles, or being around other loud machines.  Hearing loss can affect your work and home life. It can make you feel lonely or depressed. You may feel that you have lost your independence. But hearing aids and other devices can help you hear better and feel connected to others.  Follow-up care is a key part of your treatment and safety. Be sure to make and go to all appointments, and call your doctor if you are having problems. It's also a good idea to know your test results and keep a list of the medicines you take.  How can you care for yourself at home?  Avoid loud noises whenever possible. This helps keep your hearing from getting worse.  Always wear hearing protection around loud noises.  Wear a hearing aid as directed.  A professional can help you pick a hearing aid that will work best for you.  You can also get hearing aids over the counter for mild to moderate hearing loss.  Have hearing tests as your doctor suggests. They can show whether your hearing has changed. Your hearing aid may need to be adjusted.  Use other devices as needed. These may include:  Telephone amplifiers and hearing aids that can connect to a television, stereo, radio, or microphone.  Devices that use lights or vibrations. These alert you to the doorbell, a ringing telephone, or a baby monitor.  Television closed-captioning. This shows the words at the bottom of the screen. Most new TVs can do this.  TTY (text telephone). This lets you type messages back and forth on the telephone instead of talking or listening. These devices are also called TDD. When messages are typed on the keyboard, they are sent over the phone line to a receiving TTY. The message is shown on a monitor.  Use text messaging, social media, and email if it is hard for you to  "communicate by telephone.  Try to learn a listening technique called speechreading. It is not lipreading. You pay attention to people's gestures, expressions, posture, and tone of voice. These clues can help you understand what a person is saying. Face the person you are talking to, and have them face you. Make sure the lighting is good. You need to see the other person's face clearly.  Think about counseling if you need help to adjust to your hearing loss.  When should you call for help?  Watch closely for changes in your health, and be sure to contact your doctor if:    You think your hearing is getting worse.     You have new symptoms, such as dizziness or nausea.   Where can you learn more?  Go to https://www.Editlite.net/patiented  Enter R798 in the search box to learn more about \"Hearing Loss: Care Instructions.\"  Current as of: October 27, 2024  Content Version: 14.5    3293-0245 [employer].   Care instructions adapted under license by your healthcare professional. If you have questions about a medical condition or this instruction, always ask your healthcare professional. [employer] disclaims any warranty or liability for your use of this information.    Learning About Stress  What is stress?     Stress is your body's response to a hard situation. Your body can have a physical, emotional, or mental response. Stress is a fact of life for most people, and it affects everyone differently. What causes stress for you may not be stressful for someone else.  A lot of things can cause stress. You may feel stress when you go on a job interview, take a test, or run a race. This kind of short-term stress is normal and even useful. It can help you if you need to work hard or react quickly. For example, stress can help you finish an important job on time.  Long-term stress is caused by ongoing stressful situations or events. Examples of long-term stress include long-term health problems, ongoing " problems at work, or conflicts in your family. Long-term stress can harm your health.  How does stress affect your health?  When you are stressed, your body responds as though you are in danger. It makes hormones that speed up your heart, make you breathe faster, and give you a burst of energy. This is called the fight-or-flight stress response. If the stress is over quickly, your body goes back to normal and no harm is done.  But if stress happens too often or lasts too long, it can have bad effects. Long-term stress can make you more likely to get sick, and it can make symptoms of some diseases worse. If you tense up when you are stressed, you may develop neck, shoulder, or low back pain. Stress is linked to high blood pressure and heart disease.  Stress also harms your emotional health. It can make you francois, tense, or depressed. Your relationships may suffer, and you may not do well at work or school.  What can you do to manage stress?  You can try these things to help manage stress:   Do something active. Exercise or activity can help reduce stress. Walking is a great way to get started. Even everyday activities such as housecleaning or yard work can help.  Try yoga or tanna chi. These techniques combine exercise and meditation. You may need some training at first to learn them.  Do something you enjoy. For example, listen to music or go to a movie. Practice your hobby or do volunteer work.  Meditate. This can help you relax, because you are not worrying about what happened before or what may happen in the future.  Do guided imagery. Imagine yourself in any setting that helps you feel calm. You can use online videos, books, or a teacher to guide you.  Do breathing exercises. For example:  From a standing position, bend forward from the waist with your knees slightly bent. Let your arms dangle close to the floor.  Breathe in slowly and deeply as you return to a standing position. Roll up slowly and lift your head  "last.  Hold your breath for just a few seconds in the standing position.  Breathe out slowly and bend forward from the waist.  Let your feelings out. Talk, laugh, cry, and express anger when you need to. Talking with supportive friends or family, a counselor, or a rogelio leader about your feelings is a healthy way to relieve stress. Avoid discussing your feelings with people who make you feel worse.  Write. It may help to write about things that are bothering you. This helps you find out how much stress you feel and what is causing it. When you know this, you can find better ways to cope.  What can you do to prevent stress?  You might try some of these things to help prevent stress:  Manage your time. This helps you find time to do the things you want and need to do.  Get enough sleep. Your body recovers from the stresses of the day while you are sleeping.  Get support. Your family, friends, and community can make a difference in how you experience stress.  Limit your news feed. Avoid or limit time on social media or news that may make you feel stressed.  Do something active. Exercise or activity can help reduce stress. Walking is a great way to get started.  Where can you learn more?  Go to https://www.[employer]App.net/patiented  Enter N032 in the search box to learn more about \"Learning About Stress.\"  Current as of: October 24, 2024  Content Version: 14.5 2024-2025 [employer].   Care instructions adapted under license by your healthcare professional. If you have questions about a medical condition or this instruction, always ask your healthcare professional. [employer] disclaims any warranty or liability for your use of this information.    Bladder Training: Care Instructions  Your Care Instructions     Bladder training is used to treat urge incontinence and stress incontinence. Urge incontinence means that the need to urinate comes on so fast that you can't get to a toilet in time. Stress " incontinence means that you leak urine because of pressure on your bladder. For example, it may happen when you laugh, cough, or lift something heavy.  Bladder training can increase how long you can wait before you have to urinate. It can also help your bladder hold more urine. And it can give you better control over the urge to urinate.  It is important to remember that bladder training takes a few weeks to a few months to make a difference. You may not see results right away, but don't give up.  Follow-up care is a key part of your treatment and safety. Be sure to make and go to all appointments, and call your doctor if you are having problems. It's also a good idea to know your test results and keep a list of the medicines you take.  How can you care for yourself at home?  Work with your doctor to come up with a bladder training program that is right for you. You may use one or more of the following methods.  Delayed urination  In the beginning, try to keep from urinating for 5 minutes after you first feel the need to go.  While you wait, take deep, slow breaths to relax. Kegel exercises can also help you delay the need to go to the bathroom.  After some practice, when you can easily wait 5 minutes to urinate, try to wait 10 minutes before you urinate.  Slowly increase the waiting period until you are able to control when you have to urinate.  Scheduled urination  Empty your bladder when you first wake up in the morning.  Schedule times throughout the day when you will urinate.  Start by going to the bathroom every hour, even if you don't need to go.  Slowly increase the time between trips to the bathroom.  When you have found a schedule that works well for you, keep doing it.  If you wake up during the night and have to urinate, do it. Apply your schedule to waking hours only.  Kegel exercises  These tighten and strengthen pelvic muscles, which can help you control the flow of urine. (If doing these exercises  "causes pain, stop doing them and talk with your doctor.) To do Kegel exercises:  Squeeze your muscles as if you were trying not to pass gas. Or squeeze your muscles as if you were stopping the flow of urine. Your belly, legs, and buttocks shouldn't move.  Hold the squeeze for 3 seconds, then relax for 5 to 10 seconds.  Start with 3 seconds, then add 1 second each week until you are able to squeeze for 10 seconds.  Repeat the exercise 10 times a session. Do 3 to 8 sessions a day.  When should you call for help?  Watch closely for changes in your health, and be sure to contact your doctor if:    Your incontinence is getting worse.     You do not get better as expected.   Where can you learn more?  Go to https://www.[employer]itas.net/patiented  Enter V684 in the search box to learn more about \"Bladder Training: Care Instructions.\"  Current as of: April 30, 2024  Content Version: 14.5    [id number] [employer].   Care instructions adapted under license by your healthcare professional. If you have questions about a medical condition or this instruction, always ask your healthcare professional. [employer] disclaims any warranty or liability for your use of this information.    Chronic Pain: Care Instructions  Your Care Instructions     Chronic pain is pain that lasts a long time (months or even years) and may or may not have a clear cause. It is different from acute pain, which usually does have a clear cause--like an injury or illness--and gets better over time. Chronic pain:  Lasts over time but may vary from day to day.  Does not go away despite efforts to end it.  May disrupt your sleep and lead to fatigue.  May cause depression or anxiety.  May make your muscles tense, causing more pain.  Can disrupt your work, hobbies, home life, and relationships with friends and family.  Chronic pain is a very real condition. It is not just in your head. Treatment can help and usually includes several methods " used together, such as medicines, physical therapy, exercise, and other treatments. Learning how to relax and changing negative thought patterns can also help you cope.  Chronic pain is complex. Taking an active role in your treatment will help you better manage your pain. Tell your doctor if you have trouble dealing with your pain. You may have to try several things before you find what works best for you.  Follow-up care is a key part of your treatment and safety. Be sure to make and go to all appointments, and call your doctor if you are having problems. It's also a good idea to know your test results and keep a list of the medicines you take.  How can you care for yourself at home?  Pace yourself. Break up large jobs into smaller tasks. Save harder tasks for days when you have less pain, or go back and forth between hard tasks and easier ones. Take rest breaks.  Relax, and reduce stress. Relaxation techniques such as deep breathing or meditation can help.  Keep moving. Gentle, daily exercise can help reduce pain over the long run. Try low- or no-impact exercises such as walking, swimming, and stationary biking. Do stretches to stay flexible.  Try heat, cold packs, and massage.  Get enough sleep. Chronic pain can make you tired and drain your energy. Talk with your doctor if you have trouble sleeping because of pain.  Think positive. Your thoughts can affect your pain level. Do things that you enjoy to distract yourself when you have pain instead of focusing on the pain. See a movie, read a book, listen to music, or spend time with a friend.  If you think you are depressed, talk to your doctor about treatment.  Keep a daily pain diary. Record how your moods, thoughts, sleep patterns, activities, and medicine affect your pain. You may find that your pain is worse during or after certain activities or when you are feeling a certain emotion. Having a record of your pain can help you and your doctor find the best  "ways to treat your pain.  Take pain medicines exactly as directed.  If the doctor gave you a prescription medicine for pain, take it as prescribed.  If you are not taking a prescription pain medicine, ask your doctor if you can take an over-the-counter medicine.  Reducing constipation caused by pain medicine  Talk to your doctor about a laxative. If a laxative doesn't work, your doctor may suggest a prescription medicine.  Include fruits, vegetables, beans, and whole grains in your diet each day. These foods are high in fiber.  If your doctor recommends it, get more exercise. Walking is a good choice. Bit by bit, increase the amount you walk every day. Try for at least 30 minutes on most days of the week.  Schedule time each day for a bowel movement. A daily routine may help. Take your time and do not strain when having a bowel movement.  When should you call for help?   Call your doctor now or seek immediate medical care if:    Your pain gets worse or is out of control.     You feel down or blue, or you do not enjoy things like you once did. You may be depressed, which is common in people with chronic pain. Depression can be treated.     You have vomiting or cramps for more than 2 hours.   Watch closely for changes in your health, and be sure to contact your doctor if:    You cannot sleep because of pain.     You are very worried or anxious about your pain.     You have trouble taking your pain medicine.     You have any concerns about your pain medicine.     You have trouble with bowel movements, such as:  No bowel movement in 3 days.  Blood in the anal area, in your stool, or on the toilet paper.  Diarrhea for more than 24 hours.   Where can you learn more?  Go to https://www.healthwise.net/patiented  Enter N004 in the search box to learn more about \"Chronic Pain: Care Instructions.\"     "

## 2025-07-17 ENCOUNTER — TELEPHONE (OUTPATIENT)
Dept: PEDIATRICS | Facility: CLINIC | Age: 65
End: 2025-07-17
Payer: MEDICARE

## 2025-07-17 ENCOUNTER — RESULTS FOLLOW-UP (OUTPATIENT)
Dept: PEDIATRICS | Facility: CLINIC | Age: 65
End: 2025-07-17
Payer: MEDICARE

## 2025-07-17 DIAGNOSIS — R11.0 NAUSEA: ICD-10-CM

## 2025-07-17 DIAGNOSIS — G43.809 OTHER MIGRAINE WITHOUT STATUS MIGRAINOSUS, NOT INTRACTABLE: ICD-10-CM

## 2025-07-17 DIAGNOSIS — Z00.00 LABORATORY EXAMINATION ORDERED AS PART OF A ROUTINE GENERAL MEDICAL EXAMINATION: Primary | ICD-10-CM

## 2025-07-17 RX ORDER — ONDANSETRON 8 MG/1
8 TABLET, ORALLY DISINTEGRATING ORAL EVERY 8 HOURS PRN
Qty: 30 TABLET | Refills: 1 | Status: SHIPPED | OUTPATIENT
Start: 2025-07-17

## 2025-07-17 RX ORDER — OXYCODONE HYDROCHLORIDE 5 MG/1
5-10 TABLET ORAL EVERY 6 HOURS PRN
Qty: 45 TABLET | Refills: 0 | Status: SHIPPED | OUTPATIENT
Start: 2025-07-17

## 2025-07-17 NOTE — TELEPHONE ENCOUNTER
For Ondansetron     Clinic RN: Please investigate patient's chart or contact patient if the information cannot be found because this medication was prescribed for an acute condition. Confirm current symptoms/need for medication and possible need for appointment. If necessary, document reason and route refill encounter to provider for approval or denial.    Ivis Tapia RN on 7/17/2025 at 1:28 PM    
RN called patient to inquire about medications. Patient states that she keeps the oxycodone and zofran on hand in case she gets a migraine. Patient is leaving tomorrow night for [facility] and was looking to fill them before she leaves. LOV 7/14/25.     Dr. Milligan- please review pended medications and sign if appropriate.     Rachel Marshall RN on 7/17/2025 at 3:51 PM   Michelle DEE RN     
negative...

## 2025-08-09 DIAGNOSIS — G43.809 OTHER MIGRAINE WITHOUT STATUS MIGRAINOSUS, NOT INTRACTABLE: ICD-10-CM

## 2025-08-11 RX ORDER — RIZATRIPTAN BENZOATE 10 MG/1
TABLET ORAL
Qty: 12 TABLET | Refills: 2 | Status: SHIPPED | OUTPATIENT
Start: 2025-08-11

## (undated) DEVICE — Device

## (undated) DEVICE — INTRO GLIDESHEATH SLENDER 6FR 10X45CM 60-1060

## (undated) DEVICE — WIRE GUIDE 0.035"X260CM SAFE-T-J EXCHANGE G00517

## (undated) DEVICE — CATH JACKY 5FR 3.5 CURVE 40-5023

## (undated) DEVICE — DEFIB PRO-PADZ LVP LQD GEL ADULT 8900-2105-01

## (undated) DEVICE — CATH ANGIO INFINITI JR4 4FRX100CM 538421

## (undated) DEVICE — KIT HAND CONTROL ANGIOTOUCH ACIST 65CM AT-P65

## (undated) DEVICE — SLEEVE TR BAND RADIAL COMPRESSION DEVICE 24CM TRB24-REG

## (undated) DEVICE — MANIFOLD KIT ANGIO AUTOMATED 014613